# Patient Record
Sex: MALE | Race: BLACK OR AFRICAN AMERICAN | Employment: OTHER | URBAN - METROPOLITAN AREA
[De-identification: names, ages, dates, MRNs, and addresses within clinical notes are randomized per-mention and may not be internally consistent; named-entity substitution may affect disease eponyms.]

---

## 2018-02-16 ENCOUNTER — OFFICE VISIT (OUTPATIENT)
Dept: CARDIOLOGY CLINIC | Age: 68
End: 2018-02-16

## 2018-02-16 VITALS
BODY MASS INDEX: 32.11 KG/M2 | SYSTOLIC BLOOD PRESSURE: 120 MMHG | DIASTOLIC BLOOD PRESSURE: 82 MMHG | OXYGEN SATURATION: 97 % | HEIGHT: 69 IN | HEART RATE: 77 BPM | RESPIRATION RATE: 16 BRPM | WEIGHT: 216.8 LBS

## 2018-02-16 DIAGNOSIS — I10 ESSENTIAL HYPERTENSION, BENIGN: ICD-10-CM

## 2018-02-16 DIAGNOSIS — I49.9 IRREGULAR HEARTBEAT: Primary | ICD-10-CM

## 2018-02-16 DIAGNOSIS — E78.2 MIXED HYPERLIPIDEMIA: ICD-10-CM

## 2018-02-16 DIAGNOSIS — I50.22 SYSTOLIC CHF, CHRONIC (HCC): ICD-10-CM

## 2018-02-16 RX ORDER — IRBESARTAN 300 MG/1
300 TABLET ORAL
COMMUNITY
End: 2019-02-06 | Stop reason: ALTCHOICE

## 2018-02-16 RX ORDER — ATORVASTATIN CALCIUM 10 MG/1
10 TABLET, FILM COATED ORAL DAILY
Qty: 90 TAB | Refills: 3 | Status: SHIPPED | OUTPATIENT
Start: 2018-02-16 | End: 2019-02-20 | Stop reason: SDUPTHER

## 2018-02-16 RX ORDER — BISMUTH SUBSALICYLATE 262 MG
1 TABLET,CHEWABLE ORAL DAILY
COMMUNITY

## 2018-02-16 RX ORDER — VITAMIN E 268 MG
CAPSULE ORAL DAILY
COMMUNITY
End: 2018-12-21

## 2018-02-16 RX ORDER — MELATONIN
DAILY
COMMUNITY
End: 2018-12-21

## 2018-02-16 NOTE — MR AVS SNAPSHOT
[address] 
[phone] Patient: Karson Varela MRN:  [id number] Visit Information Date & Time Provider Department Dept. Phone Encounter #  
 2/16/2018 10:15 AM [address], [place] [facility] [phone] [id number] Your Appointments 3/7/2018  9:00 AM  
ESTABLISHED PATIENT with ECHO, [facility]) Appt Note: Dr. Angie Trinh , 2D ECHO COMPLETE ADULT (TTE) W OR WO CONTR [[id number]] (Order [id number]) 5'9\" 216pds STRESS TEST LEXISCAN/CARDIOLITE [[id number]] (Order [id number])  
 [address] [facility] [address]  
[phone] [address] [facility] [address] [address]  
  
    
 3/7/2018 10:00 AM  
NUCLEAR MEDICINE with NUCLEAR, [facility]) Appt Note: Dr. Angie Trinh , 2D ECHO COMPLETE ADULT (TTE) W OR WO CONTR [[id number]] (Order [id number]) 5'9\" 216pds STRESS TEST LEXISCAN/CARDIOLITE [[id number]] (Order [id number])  
 [address] [facility] [address]  
[phone] [address] [facility] [address] Upcoming Health Maintenance Date Due Hepatitis C Screening 1950 DTaP/Tdap/Td series (1 - Tdap) 9/21/1971 FOBT Q 1 YEAR AGE 50-75 9/21/2000 ZOSTER VACCINE AGE 60> 7/21/2010 GLAUCOMA SCREENING Q2Y 9/21/2015 Pneumococcal 65+ Low/Medium Risk (1 of 2 - PCV13) 9/21/2015 MEDICARE YEARLY EXAM 9/21/2015 Influenza Age 5 to Adult 8/1/2017 Allergies as of 2/16/2018  Review Complete On: 2/16/2018 By: Homer Libman, LPN No Known Allergies Current Immunizations  Never Reviewed No immunizations on file. Not reviewed this visit You Were Diagnosed With   
  
 Codes Comments Irregular heartbeat    -  Primary ICD-10-CM: I49.9 ICD-9-CM: 427.9  Systolic CHF, chronic (HCC)     ICD-10-CM: I50.22 
 ICD-9-CM: 428.22, 428.0 Essential hypertension, benign     ICD-10-CM: I10 
ICD-9-CM: 401.1 Mixed hyperlipidemia     ICD-10-CM: E78.2 ICD-9-CM: 272.2 Vitals BP Pulse Resp Height(growth percentile) Weight(growth percentile) SpO2  
 120/82 (BP 1 Location: Right arm, BP Patient Position: Sitting) 77 16 5' 9\" (1.753 m) 216 lb 12.8 oz (98.3 kg) 97% BMI Smoking Status 32.02 kg/m2 Current Every Day Smoker Vitals History BMI and BSA Data Body Mass Index Body Surface Area 32.02 kg/m 2 2.19 m 2 Preferred Pharmacy Pharmacy Name [facility] - [address] [place] [address] - [address] [place] [address] [address] [phone] Your Updated Medication List  
  
   
This list is accurate as of: 2/16/18 11:23 AM.  Always use your most recent med list. amLODIPine 5 mg tablet Commonly known as:  Dimas Sharp Take 5 mg by mouth daily. aspirin delayed-release 81 mg tablet Take  by mouth daily. atorvastatin 10 mg tablet Commonly known as:  LIPITOR Take 1 Tab by mouth daily. BENICAR 40 mg tablet Generic drug:  olmesartan Take  by mouth daily. carvedilol 25 mg tablet Commonly known as:  Mary Jo [place] Take 25 mg by mouth two (2) times daily (with meals). furosemide 40 mg tablet Commonly known as:  LASIX Take 20 mg by mouth daily. irbesartan 300 mg tablet Commonly known as:  AVAPRO Take 300 mg by mouth nightly. KLOR-CON M10 10 mEq tablet Generic drug:  potassium chloride Take  by mouth.  
  
 metFORMIN 500 mg tablet Commonly known as:  GLUCOPHAGE Take  by mouth two (2) times daily (with meals). multivitamin tablet Commonly known as:  ONE A DAY Take 1 Tab by mouth daily. VITAMIN D3 1,000 unit tablet Generic drug:  cholecalciferol Take  by mouth daily. vitamin E 400 unit capsule Commonly known as:  [address] Take  by mouth daily. Prescriptions Sent to Pharmacy Refills  
 atorvastatin (LIPITOR) 10 mg tablet 3 Sig: Take 1 Tab by mouth daily. Class: Normal  
 Pharmacy: [address] [place] [address], [facility] [address] [place] [address] #: [phone] Route: Oral  
  
We Performed the Following AMB POC EKG ROUTINE W/ 12 LEADS, INTER & REP [81621 CPT(R)] CK [id number] CPT(R)] LIPID PANEL [96148 CPT(R)] METABOLIC PANEL, COMPREHENSIVE [51001 CPT(R)] To-Do List   
 Around 02/19/2018 ECHO:  2D ECHO COMPLETE ADULT (TTE) W OR WO CONTR Around 02/19/2018 ECG:  STRESS TEST LEXISCAN/CARDIOLITE Introducing [facility] [place] [facility]! Tavo Villasenor introduces [employer] patient portal. Now you can access parts of your medical record, email your doctor's office, and request medication refills online. 1. In your internet browser, go to [email or web address] 2. Click on the First Time User? Click Here link in the Sign In box. You will see the New Member Sign Up page. 3. Enter your [employer] Access Code exactly as it appears below. You will not need to use this code after youve completed the sign-up process. If you do not sign up before the expiration date, you must request a new code. · [employer] Access Code: [email or web address] Expires: 5/17/2018 10:00 AM 
 
4. Enter the last four digits of your Social Security Number (xxxx) and Date of Birth (mm/dd/yyyy) as indicated and click Submit. You will be taken to the next sign-up page. 5. Create a [employer] ID. This will be your [employer] login ID and cannot be changed, so think of one that is secure and easy to remember. 6. Create a [employer] password. You can change your password at any time. 7. Enter your Password Reset Question and Answer. This can be used at a later time if you forget your password. 8. Enter your e-mail address. You will receive e-mail notification when new information is available in [address] 9. Click Sign Up. You can now view and download portions of your medical record. 10. Click the Download Summary menu link to download a portable copy of your medical information. If you have questions, please visit the Frequently Asked Questions section of the [employer] website. Remember, [employer] is NOT to be used for urgent needs. For medical emergencies, dial 911. Now available from your iPhone and Android! Please provide this summary of care documentation to your next provider. Your primary care clinician is listed as Tahmina Limon. If you have any questions after today's visit, please call [phone].

## 2018-02-16 NOTE — PROGRESS NOTES
Chief Complaint   Patient presents with    New Patient     per PCP due to irregular heartbeat and possible left bundle     1. Have you been to the ER, urgent care clinic since your last visit? Hospitalized since your last visit? No    2. Have you seen or consulted any other health care providers outside of the [facility] [place] [facility]"" since your last visit? Include any pap smears or colon screening.  No

## 2018-02-16 NOTE — PROGRESS NOTES
NAME:  Neo Fernandez   :   1950   MRN:   [id number]   PCP:  Henri Haley MD           Subjective: The patient is a 79 y.o. male  Who was last seen by us in . Medical hx significant for HTN, CAD s/p CABG , hyperlipidemia, DM type 2. He presents here today for irregular heart rhythm, noticed by PCP during end of January office visit. The pt presents without c/o chest pain, SOB, orthopnea, edema, palpitations, PND, syncope or near-syncope. Exercises twice a wk on treadmill at 5% incline with no problem. Past Medical History:   Diagnosis Date    CAD (coronary artery disease)     Chronic systolic HF (heart failure) (HCC)     DM type 2 (diabetes mellitus, type 2) (HCC)     Gout     HTN (hypertension)     Hypercholesterolemia        Social History   Substance Use Topics    Smoking status: Current Every Day Smoker     Packs/day: 0.50     Years: 40.00    Smokeless tobacco: Never Used    Alcohol use No      Family History   Problem Relation Age of Onset    Heart Disease Father     Heart Attack Father     Hypertension Brother         Review of Systems  Constitutional: Negative for fever, chills, and diaphoresis. Respiratory: Negative for cough, hemoptysis, sputum production, shortness of breath and wheezing. Cardiovascular: Negative for chest pain, palpitations, orthopnea, claudication, leg swelling and PND. Gastrointestinal: Negative for heartburn, nausea, vomiting, blood in stool and melena. Genitourinary: Negative for dysuria and flank pain. Musculoskeletal: Negative for joint pain and back pain; (+) R arm weakness   Skin: Negative for rash. Neurological: Negative for focal weakness, seizures, loss of consciousness, weakness and headaches. Endo/Heme/Allergies: Does not bruise/bleed easily. Psychiatric/Behavioral: Negative for memory loss. The patient does not have insomnia.         Objective:       Vitals:    18 1003 18 1017   BP: 110/82 120/82   Pulse: 77 Resp: 16    SpO2: 97%    Weight: 216 lb 12.8 oz (98.3 kg)    Height: 5' 9\" (1.753 m)     Body mass index is 32.02 kg/(m^2). General PE    Gen: NAD     Mental Status - Alert. General Appearance - Not in acute distress. Neck - no JVD     Chest and Lung Exam     Inspection: Accessory muscles - No use of accessory muscles in breathing. Auscultation:   Breath sounds: - Normal.     Cardiovascular   Inspection: Jugular vein - Bilateral - Inspection Normal.   Palpation/Percussion:   Apical Impulse: - Normal.   Auscultation: Rhythm - Irregular. Heart Sounds - S1 WNL and S2 WNL. No S3 or S4. Murmurs & Other Heart Sounds: Auscultation of the heart reveals - No Murmurs. Peripheral Vascular   Upper Extremity: Inspection - Bilateral - No Cyanotic nailbeds or Digital clubbing. Lower Extremity:   Palpation: Edema - Bilateral - No edema. Abdomen: Soft, non-tender, bowel sounds are active. Neuro: A&O times 3, CN and motor grossly WNL      Data Review:     EKG -  SR c PACs, Intraventricular conduction defect, VR 75  Echo   10'13  SUMMARY:  Left ventricle: Systolic function was mildly to moderately reduced. Ejection fraction was estimated in the range of 45 % to 50 %. There was  possible dyskinesis of the basal-mid anteroseptal and basal-mid  inferoseptal wall(s). There was possible dyskinesis of the apical septal  wall(s). Doppler parameters were consistent with abnormal left ventricular  relaxation (grade 1 diastolic dysfunction). Allergies reviewed  No Known Allergies    Medications reviewed  Current Outpatient Prescriptions   Medication Sig    irbesartan (AVAPRO) 300 mg tablet Take 300 mg by mouth nightly.  cholecalciferol (VITAMIN D3) 1,000 unit tablet Take  by mouth daily.  multivitamin (ONE A DAY) tablet Take 1 Tab by mouth daily.  vitamin E (AQUA GEMS) 400 unit capsule Take  by mouth daily.  aspirin delayed-release 81 mg tablet Take  by mouth daily.     carvedilol (COREG) 25 mg tablet Take 25 mg by mouth two (2) times daily (with meals).  potassium chloride (KLOR-CON M10) 10 mEq tablet Take  by mouth.  metFORMIN (GLUCOPHAGE) 500 mg tablet Take  by mouth two (2) times daily (with meals).  furosemide (LASIX) 40 mg tablet Take 20 mg by mouth daily.  amLODIPine (NORVASC) 5 mg tablet Take 5 mg by mouth daily.  olmesartan (BENICAR) 40 mg tablet Take  by mouth daily. No current facility-administered medications for this visit. Assessment:       ICD-10-CM ICD-9-CM    1. Irregular heartbeat I49.9 427.9 AMB POC EKG ROUTINE W/ 12 LEADS, INTER & REP        Orders Placed This Encounter    AMB POC EKG ROUTINE W/ 12 LEADS, INTER & REP     Order Specific Question:   Reason for Exam:     Answer:   routine    irbesartan (AVAPRO) 300 mg tablet     Sig: Take 300 mg by mouth nightly.  cholecalciferol (VITAMIN D3) 1,000 unit tablet     Sig: Take  by mouth daily.  multivitamin (ONE A DAY) tablet     Sig: Take 1 Tab by mouth daily.  vitamin E (AQUA GEMS) 400 unit capsule     Sig: Take  by mouth daily. Patient Active Problem List   Diagnosis Code    ASHD (arteriosclerotic heart disease) H31.15    Systolic CHF, chronic (HCC) I50.22    Essential hypertension, benign I10    Mixed hyperlipidemia E78.2       Plan:     ASCVD, HLD  Hx CABG x 5 in 2005   Cont ASA, BB  Start low dose Atorvastatin. Ordered FLP, lab slip given to pt  Will order lexiscan to evaluate for ischemia    HFrEF, Echo 10'13 EF 45-50%  Clinically compensated. Continue Furosemide, BB, ARB  Will repeat Echo    Irregular Heart Rhythm  EKG revealed SR with PACs. Asymptomatic. Current Smoker <05. PPD x 30+ yrs  Discussed tobacco cessation      Patient presents to reestablish care, new arrhythmia. Continue current care and follow up when testing complete. Mark Marrero NP      [facility]    2/16/2018         Patient seen, examined by me personally. Plan discussed as detailed.  Agree with note as outlined by  NP. I confirm findings in history and physical exam. No additional findings noted. Agree with plan as outlined above.      Brian Tian MD

## 2018-02-21 LAB
ALBUMIN SERPL-MCNC: 4.1 G/DL (ref 3.6–4.8)
ALBUMIN/GLOB SERPL: 1.6 {RATIO} (ref 1.2–2.2)
ALP SERPL-CCNC: 100 IU/L (ref 39–117)
ALT SERPL-CCNC: 28 IU/L (ref 0–44)
AST SERPL-CCNC: 25 IU/L (ref 0–40)
BILIRUB SERPL-MCNC: 0.9 MG/DL (ref 0–1.2)
BUN SERPL-MCNC: 8 MG/DL (ref 8–27)
BUN/CREAT SERPL: 8 (ref 10–24)
CALCIUM SERPL-MCNC: 9 MG/DL (ref 8.6–10.2)
CHLORIDE SERPL-SCNC: 99 MMOL/L (ref 96–106)
CHOLEST SERPL-MCNC: 148 MG/DL (ref 100–199)
CK SERPL-CCNC: 612 U/L (ref 24–204)
CO2 SERPL-SCNC: 28 MMOL/L (ref 18–29)
CREAT SERPL-MCNC: 1 MG/DL (ref 0.76–1.27)
GFR SERPLBLD CREATININE-BSD FMLA CKD-EPI: 78 ML/MIN/{1.73_M2}
GFR SERPLBLD CREATININE-BSD FMLA CKD-EPI: 90 ML/MIN/{1.73_M2}
GLOBULIN SER CALC-MCNC: 2.5 G/L (ref 1.5–4.5)
GLUCOSE SERPL-MCNC: 181 MG/DL (ref 65–99)
HDLC SERPL-MCNC: 30 MG/DL
INTERPRETATION, 910389: NORMAL
LDLC SERPL CALC-MCNC: 64 MG/DL (ref 0–99)
POTASSIUM SERPL-SCNC: 3.5 MMOL/L (ref 3.5–5.2)
PROT SERPL-MCNC: 6.6 G/DL (ref 6–8.5)
SODIUM SERPL-SCNC: 146 MMOL/L (ref 134–144)
TRIGL SERPL-MCNC: 269 MG/DL (ref 0–149)
VLDLC SERPL CALC-MCNC: 54 MG/DL (ref 5–40)

## 2018-03-07 ENCOUNTER — CLINICAL SUPPORT (OUTPATIENT)
Dept: CARDIOLOGY CLINIC | Age: 68
End: 2018-03-07

## 2018-03-07 DIAGNOSIS — R93.1 ABNORMAL NUCLEAR CARDIAC IMAGING TEST: Primary | ICD-10-CM

## 2018-03-07 DIAGNOSIS — I49.9 IRREGULAR HEARTBEAT: ICD-10-CM

## 2018-03-07 DIAGNOSIS — I50.22 SYSTOLIC CHF, CHRONIC (HCC): ICD-10-CM

## 2018-03-07 DIAGNOSIS — E78.2 MIXED HYPERLIPIDEMIA: ICD-10-CM

## 2018-03-07 DIAGNOSIS — I10 ESSENTIAL HYPERTENSION, BENIGN: ICD-10-CM

## 2018-03-13 NOTE — PROGRESS NOTES
Verified patient with two identifiers. Spoke with patient regarding STRESS/ECHO test results. Daisy Conrad pt will need a follow up with Dr. Nando Jasmine. Thanks!

## 2018-04-25 ENCOUNTER — OFFICE VISIT (OUTPATIENT)
Dept: CARDIOLOGY CLINIC | Age: 68
End: 2018-04-25

## 2018-04-25 VITALS
OXYGEN SATURATION: 98 % | HEART RATE: 67 BPM | DIASTOLIC BLOOD PRESSURE: 90 MMHG | SYSTOLIC BLOOD PRESSURE: 128 MMHG | BODY MASS INDEX: 32.22 KG/M2 | WEIGHT: 217.5 LBS | HEIGHT: 69 IN

## 2018-04-25 DIAGNOSIS — I50.22 SYSTOLIC CHF, CHRONIC (HCC): Primary | ICD-10-CM

## 2018-04-25 DIAGNOSIS — E78.2 MIXED HYPERLIPIDEMIA: ICD-10-CM

## 2018-04-25 DIAGNOSIS — I10 ESSENTIAL HYPERTENSION, BENIGN: ICD-10-CM

## 2018-04-25 DIAGNOSIS — I25.10 ASHD (ARTERIOSCLEROTIC HEART DISEASE): ICD-10-CM

## 2018-04-25 RX ORDER — SITAGLIPTIN AND METFORMIN HYDROCHLORIDE 50; 1000 MG/1; MG/1
1 TABLET, FILM COATED ORAL 2 TIMES DAILY WITH MEALS
COMMUNITY
Start: 2018-03-01 | End: 2019-07-11

## 2018-04-25 NOTE — PROGRESS NOTES
1. Have you been to the ER, urgent care clinic since your last visit? Hospitalized since your last visit? {Yes when where and reason for visit:20441}    2. Have you seen or consulted any other health care providers outside of the [facility] since your last visit? Include any pap smears or colon screening.  {Yes when where and reason for visit:20441}    TEST RESULTS

## 2018-04-25 NOTE — PROGRESS NOTES
Chief Complaint   Patient presents with    Irregular Heart Beat     STRESS TEST RESULTS     1. Have you been to the ER, urgent care clinic since your last visit? Hospitalized since your last visit? NO    2. Have you seen or consulted any other health care providers outside of the [address] [place] [address] since your last visit? Include any pap smears or colon screening.  NO

## 2018-04-25 NOTE — PROGRESS NOTES
[address], [address] [facility]  [phone]     Subjective:      Taryn Holguin is a 79 y.o. male is here to discuss result of recent stress test.  The patient denies chest pain/ shortness of breath, orthopnea, PND, LE edema, palpitations, syncope, or presyncope. Ammon [place] 03/18  Impression:   Myocardial perfusion imaging is abnormal: there is a large area of infarction in the infero-lateral wall. Overall left ventricular systolic function was abnormal: with regional wall motion abnormalities (as noted above). Compared to the study from 10/06/201, the current study reveals larger LV cavity and reduced LVEF. These test results indicate high likelihood for the presence of angiographically significant coronary artery disease.         Echo 03/18  SUMMARY:  Left ventricle: The ventricle was mildly dilated. Systolic function was  moderately reduced. Ejection fraction was estimated in the range of 35 %  to 40 %. There was severe hypokinesis of the entire inferior wall(s). Patient Active Problem List    Diagnosis Date Noted    ASHD (arteriosclerotic heart disease) 61/64/9411    Systolic CHF, chronic ([address] 09/17/2013    Essential hypertension, benign 09/17/2013    Mixed hyperlipidemia 09/17/2013      Sofía Addison MD  Past Medical History:   Diagnosis Date    CAD (coronary artery disease)     Chronic systolic HF (heart failure) ([address]     DM type 2 (diabetes mellitus, type 2) ([address]     Gout     HTN (hypertension)     Hypercholesterolemia       Past Surgical History:   Procedure Laterality Date    HX CORONARY ARTERY BYPASS GRAFT  2005     No Known Allergies   Family History   Problem Relation Age of Onset    Heart Disease Father     Heart Attack Father     Hypertension Brother       Social History     Social History    Marital status:      Spouse name: N/A    Number of children: N/A    Years of education: N/A     Occupational History    Not on file. Social History Main Topics    Smoking status: Current Every Day Smoker     Packs/day: 0.50     Years: 40.00    Smokeless tobacco: Never Used    Alcohol use No    Drug use: No    Sexual activity: Not on file     Other Topics Concern    Not on file     Social History Narrative      Current Outpatient Prescriptions   Medication Sig    JANUMET 50-1,000 mg per tablet two (2) times daily (with meals).  irbesartan (AVAPRO) 300 mg tablet Take 300 mg by mouth nightly.  cholecalciferol (VITAMIN D3) 1,000 unit tablet Take  by mouth daily.  multivitamin (ONE A DAY) tablet Take 1 Tab by mouth daily.  vitamin E (AQUA GEMS) 400 unit capsule Take  by mouth daily.  atorvastatin (LIPITOR) 10 mg tablet Take 1 Tab by mouth daily.  aspirin delayed-release 81 mg tablet Take  by mouth daily.  carvedilol (COREG) 25 mg tablet Take 25 mg by mouth two (2) times daily (with meals).  potassium chloride (KLOR-CON M10) 10 mEq tablet Take  by mouth two (2) times a day.  metFORMIN (GLUCOPHAGE) 500 mg tablet Take  by mouth two (2) times daily (with meals).  furosemide (LASIX) 40 mg tablet Take 20 mg by mouth daily.  amLODIPine (NORVASC) 5 mg tablet Take 5 mg by mouth daily. No current facility-administered medications for this visit. Review of Symptoms:  11 systems reviewed, negative other than as stated in the HPI    Physical ExamPhysical Exam:    Vitals:    04/25/18 0906 04/25/18 0913   BP: (!) 126/94 128/90   Pulse: 67    SpO2: 98%    Weight: 217 lb 8 oz (98.7 kg)    Height: 5' 9\" (1.753 m)      Body mass index is 32.12 kg/(m^2). General PE   Gen:  NAD  Mental Status - Alert. General Appearance - Not in acute distress. Chest and Lung Exam   Inspection: Accessory muscles - No use of accessory muscles in breathing.    Auscultation:   Breath sounds: - Normal.   Cardiovascular   Inspection: Jugular vein - Bilateral - Inspection Normal.   Palpation/Percussion:   Apical Impulse: - Normal. Auscultation: Rhythm - Regular. Heart Sounds - S1 WNL and S2 WNL. No S3 or S4. Murmurs & Other Heart Sounds: Auscultation of the heart reveals - No Murmurs. Peripheral Vascular   Upper Extremity: Inspection - Bilateral - No Cyanotic nailbeds or Digital clubbing. Lower Extremity:   Palpation: Edema - Bilateral - No edema. Abdomen:   Soft, non-tender, bowel sounds are active. Neuro: A&O times 3, CN and motor grossly WNL    Labs:   Lab Results   Component Value Date/Time    Cholesterol, total 148 02/20/2018 10:21 AM    HDL Cholesterol 30 (L) 02/20/2018 10:21 AM    LDL, calculated 64 02/20/2018 10:21 AM    Triglyceride 269 (H) 02/20/2018 10:21 AM     No results found for: CPK, CPKX, CPX  Lab Results   Component Value Date/Time    Sodium 146 (H) 02/20/2018 10:21 AM    Potassium 3.5 02/20/2018 10:21 AM    Chloride 99 02/20/2018 10:21 AM    CO2 28 02/20/2018 10:21 AM    Glucose 181 (H) 02/20/2018 10:21 AM    BUN 8 02/20/2018 10:21 AM    Creatinine 1.00 02/20/2018 10:21 AM    BUN/Creatinine ratio 8 (L) 02/20/2018 10:21 AM    GFR est AA 90 02/20/2018 10:21 AM    GFR est non-AA 78 02/20/2018 10:21 AM    Calcium 9.0 02/20/2018 10:21 AM    Bilirubin, total 0.9 02/20/2018 10:21 AM    AST (SGOT) 25 02/20/2018 10:21 AM    Alk. phosphatase 100 02/20/2018 10:21 AM    Protein, total 6.6 02/20/2018 10:21 AM    Albumin 4.1 02/20/2018 10:21 AM    A-G Ratio 1.6 02/20/2018 10:21 AM    ALT (SGPT) 28 02/20/2018 10:21 AM       EKG:  NSR      Assessment:     Assessment:      1. Systolic CHF, chronic ([address]    2. ASHD (arteriosclerotic heart disease)    3. Essential hypertension, benign    4. Mixed hyperlipidemia        Orders Placed This Encounter    AMB POC EKG ROUTINE W/ 12 LEADS, INTER & REP     Order Specific Question:   Reason for Exam:     Answer:   ROUTINE    JANUMET 50-1,000 mg per tablet     Sig: two (2) times daily (with meals).         Plan:     ASCVD  Hx CABG x 5 in 2005   Nuclear stress test showed infarct infero-lateral 03/18  No cardiac complaints  Cont ASA, BB, statin       HFrEF  03/18 Echo showed Systolic function down to 35-40%  (was EF 45-50% in 2013)  Clinically compensated. No cardiac complaints. No swelling on exam  Continue Furosemide, BB, ARB      HLD  02/18 LDL at target, 64. Continue statin    HTN  Controlled with current therapy     Hx Irregular Heart Rhythm  EKG today SR. Previous EKG showed PACs. Asymptomatic.     Current Smoker <05. PPD x 30+ yrs  Discussed tobacco cessation       Continue current care and f/u in 6 months. Marion Gaspar NP       [facility]    4/25/2018         Patient seen, examined by me personally. Plan discussed as detailed. Agree with note as outlined by  NP. I confirm findings in history and physical exam. No additional findings noted. Agree with plan as outlined above. Consider coronary angiography if symptoms.      Macr Bravo MD

## 2018-08-10 ENCOUNTER — OFFICE VISIT (OUTPATIENT)
Dept: CARDIOLOGY CLINIC | Age: 68
End: 2018-08-10

## 2018-08-10 VITALS
OXYGEN SATURATION: 98 % | HEART RATE: 71 BPM | RESPIRATION RATE: 16 BRPM | DIASTOLIC BLOOD PRESSURE: 84 MMHG | WEIGHT: 215.6 LBS | BODY MASS INDEX: 31.93 KG/M2 | SYSTOLIC BLOOD PRESSURE: 116 MMHG | HEIGHT: 69 IN

## 2018-08-10 DIAGNOSIS — I10 ESSENTIAL HYPERTENSION, BENIGN: ICD-10-CM

## 2018-08-10 DIAGNOSIS — E78.2 MIXED HYPERLIPIDEMIA: ICD-10-CM

## 2018-08-10 DIAGNOSIS — I50.22 SYSTOLIC CHF, CHRONIC (HCC): Primary | ICD-10-CM

## 2018-08-10 DIAGNOSIS — I25.10 ASHD (ARTERIOSCLEROTIC HEART DISEASE): ICD-10-CM

## 2018-08-10 NOTE — MR AVS SNAPSHOT
[address] [facility] 
[phone] Patient: Joyce Knight MRN:  [facility]:[id number]/36/[id number] Visit Information Date & Time Provider Department Dept. Phone Encounter #  
 8/10/2018  9:15 AM Sherry Vincent, [facility] [phone] [id number] Your Appointments 2/6/2019 10:15 AM  
ESTABLISHED PATIENT with MD [facility]) Appt Note: 6 month f/u 8/10/18 kb  
 [id number] [facility]  
[phone] [id number] [facility] Upcoming Health Maintenance Date Due Hepatitis C Screening 1950 DTaP/Tdap/Td series (1 - Tdap) 9/21/1971 FOBT Q 1 YEAR AGE 50-75 9/21/2000 ZOSTER VACCINE AGE 60> 7/21/2010 GLAUCOMA SCREENING Q2Y 9/21/2015 Pneumococcal 65+ Low/Medium Risk (1 of 2 - PCV13) 9/21/2015 MEDICARE YEARLY EXAM 3/14/2018 Influenza Age 5 to Adult 8/1/2018 Allergies as of 8/10/2018  Review Complete On: 8/10/2018 By: Glynn Roa No Known Allergies Current Immunizations  Never Reviewed No immunizations on file. Not reviewed this visit You Were Diagnosed With   
  
 Codes Comments Systolic CHF, chronic (HCC)    -  Primary ICD-10-CM: R94.24 ICD-9-CM: 428.22, 428.0 ASHD (arteriosclerotic heart disease)     ICD-10-CM: I25.10 ICD-9-CM: 414.00 Essential hypertension, benign     ICD-10-CM: I10 
ICD-9-CM: 401.1 Mixed hyperlipidemia     ICD-10-CM: E78.2 ICD-9-CM: 272.2 BMI 31.0-31.9,adult     ICD-10-CM: Z68.31 
ICD-9-CM: V85.31 Vitals BP Pulse Resp Height(growth percentile) Weight(growth percentile) SpO2  
 116/84 (BP 1 Location: Right arm, BP Patient Position: Sitting) 71 16 5' 9\" (1.753 m) 215 lb 9.6 oz (97.8 kg) 98% BMI Smoking Status 31.84 kg/m2 Current Every Day Smoker Vitals History BMI and BSA Data Body Mass Index Body Surface Area  
 31.84 kg/m 2 2.18 m 2 Preferred Pharmacy Pharmacy Name Phone [facility] [address] [place] [address] [phone] Your Updated Medication List  
  
   
This list is accurate as of 8/10/18  9:47 AM.  Always use your most recent med list. amLODIPine 5 mg tablet Commonly known as:  Allen Del Take 5 mg by mouth daily. aspirin delayed-release 81 mg tablet Take  by mouth daily. atorvastatin 10 mg tablet Commonly known as:  LIPITOR Take 1 Tab by mouth daily. carvedilol 25 mg tablet Commonly known as:  Simone Bourdon Take 25 mg by mouth two (2) times daily (with meals). furosemide 40 mg tablet Commonly known as:  LASIX Take 20 mg by mouth daily. irbesartan 300 mg tablet Commonly known as:  AVAPRO Take 300 mg by mouth nightly. JANUMET 50-1,000 mg per tablet Generic drug:  SITagliptin-metFORMIN  
two (2) times daily (with meals). KLOR-CON M10 10 mEq tablet Generic drug:  potassium chloride Take  by mouth two (2) times a day. metFORMIN 500 mg tablet Commonly known as:  GLUCOPHAGE Take  by mouth two (2) times daily (with meals). multivitamin tablet Commonly known as:  ONE A DAY Take 1 Tab by mouth daily. MUSCLE RUB EX  
as needed. VITAMIN D3 1,000 unit tablet Generic drug:  cholecalciferol Take  by mouth daily. vitamin E 400 unit capsule Commonly known as:  [address] Take  by mouth daily. We Performed the Following AMB POC EKG ROUTINE W/ 12 LEADS, INTER & REP [36228 CPT(R)] Introducing [facility]! [employer] [facility] introduces [employer] patient portal. Now you can access parts of your medical record, email your doctor's office, and request medication refills online. 1. In your internet browser, go to [email or web address] 2. Click on the First Time User? Click Here link in the Sign In box. You will see the New Member Sign Up page. 3. Enter your [employer] Access Code exactly as it appears below. You will not need to use this code after youve completed the sign-up process. If you do not sign up before the expiration date, you must request a new code. · [employer] Access Code: [email or web address] Expires: 11/8/2018  9:47 AM 
 
4. Enter the last four digits of your Social Security Number (xxxx) and Date of Birth (mm/dd/yyyy) as indicated and click Submit. You will be taken to the next sign-up page. 5. Create a MyQuoteApp ID. This will be your My[employer] login ID and cannot be changed, so think of one that is secure and easy to remember. 6. Create a [employer] password. You can change your password at any time. 7. Enter your Password Reset Question and Answer. This can be used at a later time if you forget your password. 8. Enter your e-mail address. You will receive e-mail notification when new information is available in [address] 9. Click Sign Up. You can now view and download portions of your medical record. 10. Click the Download Summary menu link to download a portable copy of your medical information. If you have questions, please visit the Frequently Asked Questions section of the [employer] website. Remember, My[employer]App is NOT to be used for urgent needs. For medical emergencies, dial 911. Now available from your iPhone and Android! Please provide this summary of care documentation to your next provider. Your primary care clinician is listed as Destin Clark. If you have any questions after today's visit, please call [phone].

## 2018-08-10 NOTE — PROGRESS NOTES
NAME:  Karma Manjarrez   :   1950   MRN:   [id number]   PCP:  Jamin Kitchen MD           Subjective: The patient is a 79y.o. year old male  who returns for a 3 mo follow upon ASHD, CHF. Since the last visit, patient reports no change in exercise tolerance, chest pain, edema, medication intolerance, palpitations, shortness of breath, PND/orthopnea wheezing, sputum, syncope, dizziness or light headedness. Doing well. Past Medical History:   Diagnosis Date    CAD (coronary artery disease)     Chronic systolic HF (heart failure) (HCC)     DM type 2 (diabetes mellitus, type 2) (HCC)     Gout     HTN (hypertension)     Hypercholesterolemia        Social History   Substance Use Topics    Smoking status: Current Every Day Smoker     Packs/day: 0.50     Years: 40.00    Smokeless tobacco: Never Used    Alcohol use No      Family History   Problem Relation Age of Onset    Heart Disease Father     Heart Attack Father     Hypertension Brother         Review of Systems  Constitutional: Negative for fever, chills, and diaphoresis. Respiratory: Negative for cough, hemoptysis, sputum production, shortness of breath and wheezing. Cardiovascular: Negative for chest pain, palpitations, orthopnea, claudication, leg swelling and PND. Gastrointestinal: Negative for heartburn, nausea, vomiting, blood in stool and melena. Genitourinary: Negative for dysuria and flank pain. Musculoskeletal: Negative for joint pain and back pain. Skin: Negative for rash. Neurological: Negative for focal weakness, seizures, loss of consciousness, weakness and headaches. Endo/Heme/Allergies: Does not bruise/bleed easily. Psychiatric/Behavioral: Negative for memory loss. The patient does not have insomnia.         Objective:       Vitals:    08/10/18 0908 08/10/18 0916   BP: 114/86 116/84   Pulse: 71    Resp: 16    SpO2: 98%    Weight: 215 lb 9.6 oz (97.8 kg)    Height: 5' 9\" (1.753 m)     Body mass index is 31.84 kg/(m^2). General PE    Gen: NAD     Mental Status - Alert. General Appearance - Not in acute distress. Neck - no JVD     Chest and Lung Exam     Inspection: Accessory muscles - No use of accessory muscles in breathing. Auscultation:   Breath sounds: - Normal.     Cardiovascular   Inspection: Jugular vein - Bilateral - Inspection Normal.   Palpation/Percussion:   Apical Impulse: - Normal.   Auscultation: Rhythm - Regular. Heart Sounds - S1 WNL and S2 WNL. No S3 or S4. Murmurs & Other Heart Sounds: Auscultation of the heart reveals - No Murmurs. Peripheral Vascular   Upper Extremity: Inspection - Bilateral - No Cyanotic nailbeds or Digital clubbing. Lower Extremity:   Palpation: Edema - Bilateral - No edema. Abdomen: Soft, non-tender, bowel sounds are active. Neuro: A&O times 3, CN and motor grossly WNL      Data Review:     EKG -  Sinus  Rhythm   -Left axis -anterior fascicular block.    -Nonspecific ST depression    LABS-   Lab Results   Component Value Date/Time    Cholesterol, total 148 02/20/2018 10:21 AM    HDL Cholesterol 30 (L) 02/20/2018 10:21 AM    LDL, calculated 64 02/20/2018 10:21 AM    VLDL, calculated 54 (H) 02/20/2018 10:21 AM    Triglyceride 269 (H) 02/20/2018 10:21 AM         Allergies reviewed  No Known Allergies    Medications reviewed  Current Outpatient Prescriptions   Medication Sig    methyl salicylate/menthol (MUSCLE RUB EX) as needed.  JANUMET 50-1,000 mg per tablet two (2) times daily (with meals).  irbesartan (AVAPRO) 300 mg tablet Take 300 mg by mouth nightly.  cholecalciferol (VITAMIN D3) 1,000 unit tablet Take  by mouth daily.  multivitamin (ONE A DAY) tablet Take 1 Tab by mouth daily.  vitamin E (AQUA GEMS) 400 unit capsule Take  by mouth daily.  atorvastatin (LIPITOR) 10 mg tablet Take 1 Tab by mouth daily.  aspirin delayed-release 81 mg tablet Take  by mouth daily.     carvedilol (COREG) 25 mg tablet Take 25 mg by mouth two (2) times daily (with meals).  potassium chloride (KLOR-CON M10) 10 mEq tablet Take  by mouth two (2) times a day.  furosemide (LASIX) 40 mg tablet Take 20 mg by mouth daily.  amLODIPine (NORVASC) 5 mg tablet Take 5 mg by mouth daily.  metFORMIN (GLUCOPHAGE) 500 mg tablet Take  by mouth two (2) times daily (with meals). No current facility-administered medications for this visit. Assessment:       ICD-10-CM ICD-9-CM    1. Systolic CHF, chronic (HCC) I50.22 428.22 AMB POC EKG ROUTINE W/ 12 LEADS, INTER & REP     428.0    2. ASHD (arteriosclerotic heart disease) I25.10 414.00    3. Essential hypertension, benign I10 401.1    4. Mixed hyperlipidemia E78.2 272.2    5. BMI 31.0-31.9,adult Z68.31 V85.31         Orders Placed This Encounter    AMB POC EKG ROUTINE W/ 12 LEADS, INTER & REP     Order Specific Question:   Reason for Exam:     Answer:   ROUTINE    methyl salicylate/menthol (MUSCLE RUB EX)     Sig: as needed. Patient Active Problem List   Diagnosis Code    ASHD (arteriosclerotic heart disease) P32.10    Systolic CHF, chronic (HCC) I50.22    Essential hypertension, benign I10    Mixed hyperlipidemia E78.2       Plan:     Patient presents for follow up. Stable and will see him back in 6 mo. ASHD -  Hx CABG x 5 in 2005   Nuclear stress test showed infarct infero-lateral 03/18  No cardiac complaints  Cont ASA, BB, statin  Encouraged daily exercise - has treadmill         HFrEF  03/18 Echo showed Systolic function down to 35-40%  (was EF 45-50% in 2013)  Clinically compensated. No cardiac complaints. Weight stable. Continue Furosemide, BB, ARB        HLD  02/18 LDL at target, 64. Continue statin     HTN  Controlled with current therapy      Hx Irregular Heart Rhythm  EKG today SR.        Current Smoker <05. PPD x 30+ yrs  Discussed tobacco cessation        Pallavi Hernández ANP    Patient seen and examined by me with nurse practitioner.   I personally performed all components of the history, physical, and medical decision making and agree with the assessment and plan with minor modifications as noted. DUY Rod MD, [facility]

## 2018-08-10 NOTE — PROGRESS NOTES
Chief Complaint   Patient presents with    CHF     3 MO. F/U     1. Have you been to the ER, urgent care clinic since your last visit? Hospitalized since your last visit? NO    2. Have you seen or consulted any other health care providers outside of the [address] [place] [address] since your last visit? Include any pap smears or colon screening.  NO

## 2018-12-21 ENCOUNTER — HOSPITAL ENCOUNTER (OUTPATIENT)
Age: 68
Setting detail: OBSERVATION
Discharge: HOME | End: 2018-12-23
Attending: EMERGENCY MEDICINE | Admitting: FAMILY MEDICINE
Payer: MEDICARE

## 2018-12-21 ENCOUNTER — APPOINTMENT (OUTPATIENT)
Dept: CT IMAGING | Age: 68
End: 2018-12-21
Attending: FAMILY MEDICINE
Payer: MEDICARE

## 2018-12-21 ENCOUNTER — APPOINTMENT (OUTPATIENT)
Dept: GENERAL RADIOLOGY | Age: 68
End: 2018-12-21
Attending: EMERGENCY MEDICINE
Payer: MEDICARE

## 2018-12-21 ENCOUNTER — APPOINTMENT (OUTPATIENT)
Dept: CT IMAGING | Age: 68
End: 2018-12-21
Attending: EMERGENCY MEDICINE
Payer: MEDICARE

## 2018-12-21 DIAGNOSIS — R55 SYNCOPE AND COLLAPSE: Primary | ICD-10-CM

## 2018-12-21 LAB
ALBUMIN SERPL-MCNC: 3.3 G/DL (ref 3.5–5)
ALBUMIN/GLOB SERPL: 0.9 {RATIO} (ref 1.1–2.2)
ALP SERPL-CCNC: 110 U/L (ref 45–117)
ALT SERPL-CCNC: 33 U/L (ref 12–78)
ANION GAP SERPL CALC-SCNC: 9 MMOL/L (ref 5–15)
APTT PPP: 26.8 SEC (ref 22.1–32)
ARTERIAL PATENCY WRIST A: YES
AST SERPL-CCNC: 19 U/L (ref 15–37)
ATRIAL RATE: 67 BPM
BASE EXCESS BLD CALC-SCNC: 2 MMOL/L
BASOPHILS # BLD: 0 K/UL (ref 0–0.1)
BASOPHILS NFR BLD: 1 % (ref 0–1)
BDY SITE: ABNORMAL
BILIRUB SERPL-MCNC: 0.9 MG/DL (ref 0.2–1)
BUN SERPL-MCNC: 10 MG/DL (ref 6–20)
BUN/CREAT SERPL: 9 (ref 12–20)
CALCIUM SERPL-MCNC: 8.5 MG/DL (ref 8.5–10.1)
CALCULATED P AXIS, ECG09: 113 DEGREES
CALCULATED R AXIS, ECG10: -60 DEGREES
CALCULATED T AXIS, ECG11: 148 DEGREES
CHLORIDE SERPL-SCNC: 105 MMOL/L (ref 97–108)
CK SERPL-CCNC: 112 U/L (ref 39–308)
CO2 SERPL-SCNC: 25 MMOL/L (ref 21–32)
COMMENT, HOLDF: NORMAL
CREAT SERPL-MCNC: 1.12 MG/DL (ref 0.7–1.3)
DIAGNOSIS, 93000: NORMAL
DIFFERENTIAL METHOD BLD: ABNORMAL
EOSINOPHIL # BLD: 0.1 K/UL (ref 0–0.4)
EOSINOPHIL NFR BLD: 2 % (ref 0–7)
ERYTHROCYTE [DISTWIDTH] IN BLOOD BY AUTOMATED COUNT: 13.1 % (ref 11.5–14.5)
EST. AVERAGE GLUCOSE BLD GHB EST-MCNC: 212 MG/DL
FOLATE SERPL-MCNC: 19.1 NG/ML (ref 5–21)
GAS FLOW.O2 O2 DELIVERY SYS: ABNORMAL L/MIN
GLOBULIN SER CALC-MCNC: 3.6 G/DL (ref 2–4)
GLUCOSE BLD STRIP.AUTO-MCNC: 239 MG/DL (ref 65–100)
GLUCOSE SERPL-MCNC: 283 MG/DL (ref 65–100)
HBA1C MFR BLD: 9 % (ref 4.2–6.3)
HCO3 BLD-SCNC: 26.2 MMOL/L (ref 22–26)
HCT VFR BLD AUTO: 46.8 % (ref 36.6–50.3)
HGB BLD-MCNC: 15.6 G/DL (ref 12.1–17)
IMM GRANULOCYTES # BLD: 0 K/UL (ref 0–0.04)
IMM GRANULOCYTES NFR BLD AUTO: 1 % (ref 0–0.5)
INR PPP: 1.2 (ref 0.9–1.1)
LACTATE SERPL-SCNC: 2.7 MMOL/L (ref 0.4–2)
LACTATE SERPL-SCNC: 3.8 MMOL/L (ref 0.4–2)
LACTATE SERPL-SCNC: 4 MMOL/L (ref 0.4–2)
LYMPHOCYTES # BLD: 1.3 K/UL (ref 0.8–3.5)
LYMPHOCYTES NFR BLD: 23 % (ref 12–49)
MAGNESIUM SERPL-MCNC: 0.3 MG/DL (ref 1.6–2.4)
MAGNESIUM SERPL-MCNC: 1.4 MG/DL (ref 1.6–2.4)
MCH RBC QN AUTO: 28.1 PG (ref 26–34)
MCHC RBC AUTO-ENTMCNC: 33.3 G/DL (ref 30–36.5)
MCV RBC AUTO: 84.2 FL (ref 80–99)
MONOCYTES # BLD: 0.5 K/UL (ref 0–1)
MONOCYTES NFR BLD: 8 % (ref 5–13)
NEUTS SEG # BLD: 3.7 K/UL (ref 1.8–8)
NEUTS SEG NFR BLD: 66 % (ref 32–75)
NRBC # BLD: 0 K/UL (ref 0–0.01)
NRBC BLD-RTO: 0 PER 100 WBC
P-R INTERVAL, ECG05: 172 MS
PCO2 BLD: 39.8 MMHG (ref 35–45)
PH BLD: 7.43 [PH] (ref 7.35–7.45)
PLATELET # BLD AUTO: 141 K/UL (ref 150–400)
PMV BLD AUTO: 10.6 FL (ref 8.9–12.9)
PO2 BLD: 60 MMHG (ref 80–100)
POTASSIUM SERPL-SCNC: 3.5 MMOL/L (ref 3.5–5.1)
PROT SERPL-MCNC: 6.9 G/DL (ref 6.4–8.2)
PROTHROMBIN TIME: 12 SEC (ref 9–11.1)
Q-T INTERVAL, ECG07: 436 MS
QRS DURATION, ECG06: 138 MS
QTC CALCULATION (BEZET), ECG08: 460 MS
RBC # BLD AUTO: 5.56 M/UL (ref 4.1–5.7)
SAMPLES BEING HELD,HOLD: NORMAL
SAO2 % BLD: 91 % (ref 92–97)
SERVICE CMNT-IMP: ABNORMAL
SODIUM SERPL-SCNC: 139 MMOL/L (ref 136–145)
SPECIMEN TYPE: ABNORMAL
THERAPEUTIC RANGE,PTTT: NORMAL SECS (ref 58–77)
TOTAL RESP. RATE, ITRR: 18
TROPONIN I SERPL-MCNC: <0.05 NG/ML
TROPONIN I SERPL-MCNC: <0.05 NG/ML
TSH SERPL DL<=0.05 MIU/L-ACNC: 0.61 UIU/ML (ref 0.36–3.74)
VENTRICULAR RATE, ECG03: 67 BPM
VIT B12 SERPL-MCNC: 138 PG/ML (ref 193–986)
WBC # BLD AUTO: 5.6 K/UL (ref 4.1–11.1)

## 2018-12-21 PROCEDURE — 85730 THROMBOPLASTIN TIME PARTIAL: CPT

## 2018-12-21 PROCEDURE — 82607 VITAMIN B-12: CPT

## 2018-12-21 PROCEDURE — 80053 COMPREHEN METABOLIC PANEL: CPT

## 2018-12-21 PROCEDURE — 82962 GLUCOSE BLOOD TEST: CPT

## 2018-12-21 PROCEDURE — 99218 HC RM OBSERVATION: CPT

## 2018-12-21 PROCEDURE — 83036 HEMOGLOBIN GLYCOSYLATED A1C: CPT

## 2018-12-21 PROCEDURE — 84484 ASSAY OF TROPONIN QUANT: CPT

## 2018-12-21 PROCEDURE — 71045 X-RAY EXAM CHEST 1 VIEW: CPT

## 2018-12-21 PROCEDURE — 74011636637 HC RX REV CODE- 636/637: Performed by: FAMILY MEDICINE

## 2018-12-21 PROCEDURE — 82746 ASSAY OF FOLIC ACID SERUM: CPT

## 2018-12-21 PROCEDURE — 83605 ASSAY OF LACTIC ACID: CPT

## 2018-12-21 PROCEDURE — 36415 COLL VENOUS BLD VENIPUNCTURE: CPT

## 2018-12-21 PROCEDURE — 70450 CT HEAD/BRAIN W/O DYE: CPT

## 2018-12-21 PROCEDURE — 74011000258 HC RX REV CODE- 258: Performed by: FAMILY MEDICINE

## 2018-12-21 PROCEDURE — 96360 HYDRATION IV INFUSION INIT: CPT

## 2018-12-21 PROCEDURE — 81001 URINALYSIS AUTO W/SCOPE: CPT

## 2018-12-21 PROCEDURE — 93005 ELECTROCARDIOGRAM TRACING: CPT

## 2018-12-21 PROCEDURE — 85610 PROTHROMBIN TIME: CPT

## 2018-12-21 PROCEDURE — 36600 WITHDRAWAL OF ARTERIAL BLOOD: CPT

## 2018-12-21 PROCEDURE — 96361 HYDRATE IV INFUSION ADD-ON: CPT

## 2018-12-21 PROCEDURE — 74011250637 HC RX REV CODE- 250/637: Performed by: FAMILY MEDICINE

## 2018-12-21 PROCEDURE — 74176 CT ABD & PELVIS W/O CONTRAST: CPT

## 2018-12-21 PROCEDURE — 96372 THER/PROPH/DIAG INJ SC/IM: CPT

## 2018-12-21 PROCEDURE — 96367 TX/PROPH/DG ADDL SEQ IV INF: CPT

## 2018-12-21 PROCEDURE — 99285 EMERGENCY DEPT VISIT HI MDM: CPT

## 2018-12-21 PROCEDURE — 84443 ASSAY THYROID STIM HORMONE: CPT

## 2018-12-21 PROCEDURE — 74011250636 HC RX REV CODE- 250/636: Performed by: FAMILY MEDICINE

## 2018-12-21 PROCEDURE — 82803 BLOOD GASES ANY COMBINATION: CPT

## 2018-12-21 PROCEDURE — 93880 EXTRACRANIAL BILAT STUDY: CPT

## 2018-12-21 PROCEDURE — 82550 ASSAY OF CK (CPK): CPT

## 2018-12-21 PROCEDURE — 96365 THER/PROPH/DIAG IV INF INIT: CPT

## 2018-12-21 PROCEDURE — 85025 COMPLETE CBC W/AUTO DIFF WBC: CPT

## 2018-12-21 PROCEDURE — 83735 ASSAY OF MAGNESIUM: CPT

## 2018-12-21 RX ORDER — SODIUM CHLORIDE 9 MG/ML
75 INJECTION, SOLUTION INTRAVENOUS CONTINUOUS
Status: DISCONTINUED | OUTPATIENT
Start: 2018-12-21 | End: 2018-12-23 | Stop reason: HOSPADM

## 2018-12-21 RX ORDER — ACETAMINOPHEN 325 MG/1
650 TABLET ORAL
Status: DISCONTINUED | OUTPATIENT
Start: 2018-12-21 | End: 2018-12-23 | Stop reason: HOSPADM

## 2018-12-21 RX ORDER — CARVEDILOL 12.5 MG/1
25 TABLET ORAL 2 TIMES DAILY WITH MEALS
Status: DISCONTINUED | OUTPATIENT
Start: 2018-12-21 | End: 2018-12-23 | Stop reason: HOSPADM

## 2018-12-21 RX ORDER — HEPARIN SODIUM 5000 [USP'U]/ML
5000 INJECTION, SOLUTION INTRAVENOUS; SUBCUTANEOUS EVERY 8 HOURS
Status: DISCONTINUED | OUTPATIENT
Start: 2018-12-21 | End: 2018-12-23 | Stop reason: HOSPADM

## 2018-12-21 RX ORDER — AMLODIPINE BESYLATE 5 MG/1
5 TABLET ORAL DAILY
Status: DISCONTINUED | OUTPATIENT
Start: 2018-12-22 | End: 2018-12-23 | Stop reason: HOSPADM

## 2018-12-21 RX ORDER — LOSARTAN POTASSIUM 50 MG/1
100 TABLET ORAL DAILY
Status: DISCONTINUED | OUTPATIENT
Start: 2018-12-22 | End: 2018-12-23 | Stop reason: HOSPADM

## 2018-12-21 RX ORDER — MAGNESIUM SULFATE 100 %
4 CRYSTALS MISCELLANEOUS AS NEEDED
Status: DISCONTINUED | OUTPATIENT
Start: 2018-12-21 | End: 2018-12-23 | Stop reason: HOSPADM

## 2018-12-21 RX ORDER — SODIUM CHLORIDE 0.9 % (FLUSH) 0.9 %
5-10 SYRINGE (ML) INJECTION EVERY 8 HOURS
Status: DISCONTINUED | OUTPATIENT
Start: 2018-12-21 | End: 2018-12-23 | Stop reason: HOSPADM

## 2018-12-21 RX ORDER — SODIUM CHLORIDE 0.9 % (FLUSH) 0.9 %
5-10 SYRINGE (ML) INJECTION AS NEEDED
Status: DISCONTINUED | OUTPATIENT
Start: 2018-12-21 | End: 2018-12-23 | Stop reason: HOSPADM

## 2018-12-21 RX ORDER — MAGNESIUM SULFATE HEPTAHYDRATE 40 MG/ML
2 INJECTION, SOLUTION INTRAVENOUS ONCE
Status: COMPLETED | OUTPATIENT
Start: 2018-12-21 | End: 2018-12-21

## 2018-12-21 RX ORDER — DEXTROSE 50 % IN WATER (D50W) INTRAVENOUS SYRINGE
25-50 AS NEEDED
Status: DISCONTINUED | OUTPATIENT
Start: 2018-12-21 | End: 2018-12-23 | Stop reason: HOSPADM

## 2018-12-21 RX ORDER — ASPIRIN 81 MG/1
81 TABLET ORAL DAILY
Status: DISCONTINUED | OUTPATIENT
Start: 2018-12-22 | End: 2018-12-23 | Stop reason: HOSPADM

## 2018-12-21 RX ORDER — INSULIN LISPRO 100 [IU]/ML
INJECTION, SOLUTION INTRAVENOUS; SUBCUTANEOUS
Status: DISCONTINUED | OUTPATIENT
Start: 2018-12-21 | End: 2018-12-23 | Stop reason: HOSPADM

## 2018-12-21 RX ADMIN — CARVEDILOL 25 MG: 12.5 TABLET, FILM COATED ORAL at 19:10

## 2018-12-21 RX ADMIN — INSULIN LISPRO 1 UNITS: 100 INJECTION, SOLUTION INTRAVENOUS; SUBCUTANEOUS at 23:12

## 2018-12-21 RX ADMIN — HEPARIN SODIUM 5000 UNITS: 5000 INJECTION INTRAVENOUS; SUBCUTANEOUS at 19:11

## 2018-12-21 RX ADMIN — Medication 10 ML: at 23:13

## 2018-12-21 RX ADMIN — SODIUM CHLORIDE 500 ML: 900 INJECTION, SOLUTION INTRAVENOUS at 14:59

## 2018-12-21 RX ADMIN — Medication 10 ML: at 18:00

## 2018-12-21 RX ADMIN — PIPERACILLIN SODIUM, TAZOBACTAM SODIUM 3.38 G: 3; .375 INJECTION, POWDER, LYOPHILIZED, FOR SOLUTION INTRAVENOUS at 23:11

## 2018-12-21 RX ADMIN — MAGNESIUM SULFATE HEPTAHYDRATE 2 G: 40 INJECTION, SOLUTION INTRAVENOUS at 22:05

## 2018-12-21 RX ADMIN — SODIUM CHLORIDE 75 ML/HR: 900 INJECTION, SOLUTION INTRAVENOUS at 19:01

## 2018-12-21 NOTE — ED PROVIDER NOTES
76 y.o. male with past medical history significant for CAD, HTN, DMT2, gout, and heart failure who presents from [place] via EMS with chief complaint of syncope. Patient states he was sitting in a chair in [place] when he had sudden onset of lightheadedness, diaphoresis, and fatigue and states he \"shut his eyes for a second\" and woke up on the floor. Patient endorses onlookers witnessed the patient slide out of his chair with head trauma and LOC for two minutes. Patient arrives [facility] ED via EMS and reports no current pain or discomfort, and states his aforementioned symptoms have since resolved. Upon examination, patient's blood pressure 133/89. Patient has history of CABG, CAD, and CHF. Patient denies urinary or bowel incontinence during the syncopal episode. Patient denies recent changes in medications. Pt denies fever, chills, cough, congestion, shortness of breath, chest pain, abdominal pain, nausea, vomiting, diarrhea, blood in stool, difficulty with urination or dysuria. There are no other acute medical concerns at this time. Social hx: Current smoker (0.5 packs/day); No EtOH use  PCP: Jake Noble MD  Cardiologist: Marisa Nichols MD    Note written by Ilsa Rivero, as dictated by Luis Fernando Flores MD 11:41 AM        The history is provided by the patient, medical records and the EMS personnel. No  was used.         Past Medical History:   Diagnosis Date    CAD (coronary artery disease)     Chronic systolic HF (heart failure) (HCC)     DM type 2 (diabetes mellitus, type 2) (HCC)     Gout     HTN (hypertension)     Hypercholesterolemia        Past Surgical History:   Procedure Laterality Date    HX CORONARY ARTERY BYPASS GRAFT  2005         Family History:   Problem Relation Age of Onset    Heart Disease Father     Heart Attack Father     Hypertension Brother        Social History     Socioeconomic History    Marital status:      Spouse name: Not on file    Number of children: Not on file    Years of education: Not on file    Highest education level: Not on file   Social Needs    Financial resource strain: Not on file    Food insecurity - worry: Not on file    Food insecurity - inability: Not on file    Transportation needs - medical: Not on file   L2 needs - non-medical: Not on file   Occupational History    Not on file   Tobacco Use    Smoking status: Current Every Day Smoker     Packs/day: 0.50     Years: 40.00     Pack years: 20.00    Smokeless tobacco: Never Used   Substance and Sexual Activity    Alcohol use: No    Drug use: No    Sexual activity: Not on file   Other Topics Concern    Not on file   Social History Narrative    Not on file         ALLERGIES: Patient has no known allergies. Review of Systems   Constitutional: Negative for activity change, appetite change, chills, fatigue and fever. HENT: Negative for congestion, ear pain, facial swelling, sore throat and trouble swallowing. Eyes: Negative for pain, discharge and visual disturbance. Respiratory: Negative for chest tightness, shortness of breath and wheezing. Cardiovascular: Negative for chest pain and palpitations. Gastrointestinal: Negative for abdominal pain, blood in stool, diarrhea, nausea and vomiting. Genitourinary: Negative for difficulty urinating, dysuria, flank pain and hematuria. Musculoskeletal: Negative for arthralgias, joint swelling, myalgias and neck pain. Skin: Negative for color change and rash. Neurological: Positive for syncope. Negative for dizziness, weakness, numbness and headaches. Hematological: Negative for adenopathy. Does not bruise/bleed easily. Psychiatric/Behavioral: Negative for behavioral problems, confusion and sleep disturbance. All other systems reviewed and are negative.       Vitals:    12/21/18 1141   BP: 133/89   Pulse: 70   Resp: 16   SpO2: 96%            Physical Exam Constitutional: He is oriented to person, place, and time. He appears well-developed and well-nourished. No distress. HENT:   Head: Normocephalic and atraumatic. Nose: Nose normal.   Mouth/Throat: Oropharynx is clear and moist.   Eyes: Conjunctivae and EOM are normal. Pupils are equal, round, and reactive to light. No scleral icterus. Neck: Normal range of motion. Neck supple. No JVD present. No tracheal deviation present. No thyromegaly present. No carotid bruits noted. Cardiovascular: Normal rate, regular rhythm, normal heart sounds and intact distal pulses. Exam reveals no gallop and no friction rub. No murmur heard. Pulmonary/Chest: Effort normal and breath sounds normal. No respiratory distress. He has no wheezes. He has no rales. He exhibits no tenderness. Abdominal: Soft. Bowel sounds are normal. He exhibits no distension and no mass. There is no tenderness. There is no rebound and no guarding. Musculoskeletal: Normal range of motion. He exhibits no edema or tenderness. Lymphadenopathy:     He has no cervical adenopathy. Neurological: He is alert and oriented to person, place, and time. He has normal reflexes. No cranial nerve deficit. Coordination normal.   Skin: Skin is warm and dry. No rash noted. No erythema. Psychiatric: He has a normal mood and affect. His behavior is normal. Judgment and thought content normal.   Nursing note and vitals reviewed.   Note written by Ilsa Estrada, as dictated by Sai Shannon MD 11:41 AM       MDM  Number of Diagnoses or Management Options     Amount and/or Complexity of Data Reviewed  Clinical lab tests: ordered and reviewed  Tests in the radiology section of CPT®: ordered and reviewed  Decide to obtain previous medical records or to obtain history from someone other than the patient: yes  Review and summarize past medical records: yes  Independent visualization of images, tracings, or specimens: yes    Risk of Complications, Morbidity, and/or Mortality  Presenting problems: moderate  Diagnostic procedures: moderate  Management options: moderate    Patient Progress  Patient progress: stable         Procedures   ED MD  EKG interpretation: There is a normal sinus rhythm at 67 beats a minute. Left axis deviation is noted. T wave inversion is noted laterally. Intervals are normal. Poor R wave progression. No other acute findings are noted. Unchanged from EKG in Aug. 2018. Luisa Cabrera MD    PROGRESS NOTE:  1:45 PM Provider with patient at bedside reviewing labs, will admit patient for further work up to figure out why he blacked out. CONSULT NOTE:  1:52 PM Leesa Kebede MD communicated with Dr. Hannah Andres, Consult for Hospitalist via [facility] Text. Discussed available diagnostic tests and clinical findings. Dr. Singh Conklin will evaluate patient for possible admission. Dr. Singh Conklin will admit patient.

## 2018-12-21 NOTE — ED NOTES
Bedside and Verbal shift change report given to Kasey Jack RN (oncoming nurse) by Antonio Noe RN (offgoing nurse). Report included the following information SBAR and ED Summary.

## 2018-12-21 NOTE — PROGRESS NOTES
Admission Medication Reconciliation:    Information obtained from: Patient's wife via phone    Significant PMH/Disease States:   Past Medical History:   Diagnosis Date    CAD (coronary artery disease)     Chronic systolic HF (heart failure) ([facility]     DM type 2 (diabetes mellitus, type 2) ([facility]     Gout     HTN (hypertension)     Hypercholesterolemia        Chief Complaint for this Admission:  Syncope    Allergies:  Patient has no known allergies. Prior to Admission Medications:   Prior to Admission Medications   Prescriptions Last Dose Informant Patient Reported? Taking? JANUMET 50-1,000 mg per tablet 12/21/2018  Yes Yes   Sig: Take 1 Tab by mouth two (2) times daily (with meals). amLODIPine (NORVASC) 5 mg tablet 12/21/2018  Yes Yes   Sig: Take 5 mg by mouth daily. aspirin delayed-release 81 mg tablet 12/21/2018  Yes Yes   Sig: Take 81 mg by mouth daily. atorvastatin (LIPITOR) 10 mg tablet 12/21/2018  No Yes   Sig: Take 1 Tab by mouth daily. carvedilol (COREG) 25 mg tablet 12/21/2018  Yes Yes   Sig: Take 25 mg by mouth two (2) times daily (with meals). furosemide (LASIX) 20 mg tablet 12/21/2018  Yes Yes   Sig: Take 20 mg by mouth daily. irbesartan (AVAPRO) 300 mg tablet 12/21/2018  Yes Yes   Sig: Take 300 mg by mouth nightly. multivitamin (ONE A DAY) tablet 12/21/2018  Yes Yes   Sig: Take 1 Tab by mouth daily. potassium chloride (KLOR-CON M10) 10 mEq tablet 12/21/2018  Yes Yes   Sig: Take 10 mEq by mouth two (2) times a day.       Facility-Administered Medications: None         Comments/Recommendations: Removed Vitamin D, metformin, vitamin E, Adan Roam

## 2018-12-21 NOTE — H&P
[address]   HISTORY AND PHYSICAL      Koffi Hall  MR#: [id number]  : 1950  ACCOUNT #: [de-identified]   ADMIT DATE: 2018    CHIEF COMPLAINT:  Syncope. HISTORY OF PRESENT ILLNESS:  The patient is a 78-year-old male with past medical history of coronary artery disease, hypertension, diabetes mellitus type 2, gout, dyslipidemia,  who presents to the hospital with the above-mentioned symptoms. The patient reports that for the past 3 days he has been having some loose stools. Patient reports that he has had 3-4 bowel movements on a daily basis  watery. Denies any . Denies any antibiotic use recently. Reports that his son had been sick. Denies any nausea, vomiting associated with his symptoms. Denies any abdominal pain. The patient reports today he was at [facility], went to the bathroom, had a bowel movement, came out and had sudden loss of consciousness. The patient reports that bystanders told him that he was \"out for 2 minutes\". Patient reports that he did hit his head on the porch step. He has never had syncope before. He has never had seizures before. Reports that bystanders told him that he was . EMS was called. The patient's blood pressure was 133/89. Patient reports that he was not incontinent during the episode. The patient was brought to the hospital and was requested to be admitted under the hospitalist service. The patient denies any headache, blurry vision, sore throat, trouble swallowing, trouble with speech, chest pain, shortness of breath, cough, fever, chills, abdominal pain, constipation, diarrhea, urinary symptoms, focal or generalized neurological weakness, recent travel, sick contacts, falls, injuries, hematemesis, melena, hemoptysis or hematuria. Patient denies any other complaints or problems. PAST MEDICAL HISTORY:  See above.      HOME MEDICATIONS:  Currently, the patient is on Janumet  mg daily b.i.d., Avapro 300 mg nightly, multivitamin 1 tablet daily, Lipitor 1 tablet daily, aspirin 81 mg daily, Coreg 25 mg daily b.i.d., potassium chloride, Lasix 20 mg daily and amlodipine 5 mg daily. SOCIAL HISTORY:  Current every day smoker, smokes half a pack per day for 40 years. No alcohol, no IV drug abuse. Lives at home. FAMILY HISTORY:  Father has a history of heart disease. Father has a history of heart attack and brother has a history of hypertension. ALLERGIES:  NO KNOWN DRUG ALLERGIES. REVIEW OF SYSTEMS:  All systems were reviewed and were found to be essentially negative except for the symptoms mentioned above. PHYSICAL EXAMINATION:  VITAL SIGNS:  Temperature is pending, pulse 68, respiratory rate 15, blood pressure 131/86, pulse ox 95% on room air. GENERAL:  Alert x3, awake, no acute distress, resting in bed, pleasant male, appears to be stated age. HEENT:  Pupils equal, reactive to light. Dry mucous membranes. Tympanic membranes clear. NECK:  Supple. CHEST:  Clear to auscultation bilaterally. COR:  S1, S2 heard. ABDOMEN:  Soft, nontender, nondistended. Bowel sounds are physiologic. EXTREMITIES:  No clubbing, no cyanosis, no edema. NEUROPSYCHIATRIC:  Pleasant mood and affect. Cranial nerves II-XII grossly intact. Sensory grossly within normal limits. DTRs 2+/4. Strength 5/5. SKIN:  Warm. LABORATORY DATA:  White count 5.6, hemoglobin 15.6, hematocrit 46.8, platelets 074. Sodium 139, potassium 3.5, chloride 105, bicarbonate 25, anion gap 9, glucose of 283. BUN 10, creatinine 1.12, calcium 8.5, bilirubin total 0.98, ALT 23, AST 19, alkaline phosphatase 110, lactate is 2.7. Troponin less than 0.05. CT of the head shows normal  without any acute abnormalities. X-ray of the chest shows no acute abnormality. EKG shows normal sinus rhythm, left axis deviation, no acute ST abnormalities. ASSESSMENT AND PLAN:  1. Syncope, unclear etiology.   Appears to be a  patient will be admitted on telemetry bed.  We will provide gentle IV hydration, monitor on telemetry, troponins, . Will get B12 and folate levels. Will get carotid duplex, echocardiogram, orthostatic vitals and further intervention per hospital course. If symptoms persist, will consider further intervention and diagnostics including Cardiology consult. I put the patient on fall precautions, further intervention per hospital course. 2.  History of congestive heart failure. Currently, patient appears to be volume contracted. Will proved gentle IV hydration  strict I's and O's and continue to closely monitor. Further intervention per hospital course. Continue home medication. 3.  Diarrhea. We will get stool cultures and CT of the abdomen and pelvis. Continue to closely monitor. If symptoms persist, may consider further intervention and diagnostics  gentle hydration and supportive care. 4.  Diabetes, poorly controlled. The patient will be on sliding scale NovoLog insulin, Accu-Cheks, diet control and close monitoring, will hold metformin. 5.  Lactic acidosis. No obvious signs of acute infection. The patient  white count. Does not appear to be febrile. Does not appear to be infected, thus will hold antibiotics for now. CT of the abdomen has been ordered. A urinalysis has been ordered. Chest x-ray does not show any acute pathology. Will repeat  in 4 hours. Gentle IV hydration, will continue to closely monitor. Reassess as needed, further intervention per hospital course. 6.  Hypertension. Continue home medications . 7.  Gastrointestinal and deep venous thrombosis prophylaxis. Patient will be on heparin.       Yael Kang MD       MM/PN  D: 12/21/2018 15:00     T: 12/21/2018 16:29  JOB #: [id number]

## 2018-12-21 NOTE — PROCEDURES
[facility]  *** FINAL REPORT ***    Name: Etta Batres  MRN: [id number]    Outpatient  : 21 Sep 1950  HIS Order #: [id number]  [address] [facility] Visit #: [id number]  Date: 21 Dec 2018    TYPE OF TEST: Cerebrovascular Duplex    REASON FOR TEST  Syncope    Right Carotid:-             Proximal               Mid                 Distal  cm/s  Systolic  Diastolic  Systolic  Diastolic  Systolic  Diastolic  CCA:     08.3      23.2                            78.8      24.9  Bulb:  ECA:     75.5      11.7  ICA:     48.6      21.7       44.2      21.7       52.4      25.3  ICA/CCA:  0.6       0.9    ICA Stenosis:    Right Vertebral:-  Finding: Antegrade  Sys:       28.2  Mandie:       11.1    Right Subclavian:    Left Carotid:-            Proximal                Mid                 Distal  cm/s  Systolic  Diastolic  Systolic  Diastolic  Systolic  Diastolic  CCA:     06.5      24.9                            76.6      26.0  Bulb:  ECA:     74.5      20.2  ICA:     45.3      22.5       55.9      31.0       30.6      15.2  ICA/CCA:  0.6       0.9    ICA Stenosis:    Left Vertebral:-  Finding: Antegrade  Sys:       34.3  Mandie:       13.7    Left Subclavian:    INTERPRETATION/FINDINGS  PROCEDURE:  Color duplex ultrasound imaging of extracranial  cerebrovascular arteries. FINDINGS:       Right:  Internal carotid velocity is within normal limits, there  is no observed narrowing of the flow channel on color Doppler imaging,   and no plaque is visualized on B-mode imaging. The common and  external carotid arteries are patent and without evidence of  hemodynamically significant stenosis. Left:  Internal carotid velocity is within normal limits, there  is no observed narrowing of the flow channel on color Doppler imaging,   and no plaque is visualized on B-mode imaging. The common and  external carotid arteries are patent and without evidence of  hemodynamically significant stenosis.     IMPRESSION:  Consistent with no stenosis of the right internal carotid   and no stenosis of the left internal carotid. Vertebrals are patent  with antegrade flow. ADDITIONAL COMMENTS    I have personally reviewed the data relevant to the interpretation of  this  study.     TECHNOLOGIST: Mima Ayala  Signed: 12/21/2018 05:04 PM    PHYSICIAN: Bruna Moore MD  Signed: 12/21/2018 08:06 PM

## 2018-12-21 NOTE — ROUTINE PROCESS
TRANSFER - OUT REPORT:    Verbal report given to Brooke Melo RN (name) on Elisa Murray  being transferred to [employer] (unit) for routine progression of care       Report consisted of patients Situation, Background, Assessment and   Recommendations(SBAR). Information from the following report(s) SBAR, ED Summary, [facility] and Recent Results was reviewed with the receiving nurse. Lines:   Peripheral IV 12/21/18 Left Antecubital (Active)        Opportunity for questions and clarification was provided.       Patient transported with:   Monitor  Tech

## 2018-12-21 NOTE — ED TRIAGE NOTES
Arrives via EMS from [address] [facility] for witnessed syncopal event with continence of bowel and bladder. Bystanders say pt was unresponsive x 2 minutes. Pt reports feeling extremely weak and diaphoretic prior to syncopal event. HX CABG, CAD, &CHF. A&Ox4, feeling baseline when EMS arrived and on arrival to ED.

## 2018-12-22 LAB
ANION GAP SERPL CALC-SCNC: 6 MMOL/L (ref 5–15)
APPEARANCE UR: CLEAR
BACTERIA URNS QL MICRO: NEGATIVE /HPF
BASOPHILS # BLD: 0 K/UL (ref 0–0.1)
BASOPHILS NFR BLD: 0 % (ref 0–1)
BILIRUB UR QL: NEGATIVE
BUN SERPL-MCNC: 11 MG/DL (ref 6–20)
BUN/CREAT SERPL: 11 (ref 12–20)
CALCIUM SERPL-MCNC: 8.3 MG/DL (ref 8.5–10.1)
CHLORIDE SERPL-SCNC: 106 MMOL/L (ref 97–108)
CK SERPL-CCNC: 488 U/L (ref 39–308)
CO2 SERPL-SCNC: 27 MMOL/L (ref 21–32)
COLOR UR: ABNORMAL
CREAT SERPL-MCNC: 1.01 MG/DL (ref 0.7–1.3)
DIFFERENTIAL METHOD BLD: ABNORMAL
EOSINOPHIL # BLD: 0.1 K/UL (ref 0–0.4)
EOSINOPHIL NFR BLD: 2 % (ref 0–7)
EPITH CASTS URNS QL MICRO: ABNORMAL /LPF
ERYTHROCYTE [DISTWIDTH] IN BLOOD BY AUTOMATED COUNT: 13 % (ref 11.5–14.5)
GLUCOSE BLD STRIP.AUTO-MCNC: 203 MG/DL (ref 65–100)
GLUCOSE BLD STRIP.AUTO-MCNC: 203 MG/DL (ref 65–100)
GLUCOSE BLD STRIP.AUTO-MCNC: 233 MG/DL (ref 65–100)
GLUCOSE BLD STRIP.AUTO-MCNC: 263 MG/DL (ref 65–100)
GLUCOSE SERPL-MCNC: 190 MG/DL (ref 65–100)
GLUCOSE UR STRIP.AUTO-MCNC: 250 MG/DL
HCT VFR BLD AUTO: 41.5 % (ref 36.6–50.3)
HGB BLD-MCNC: 14 G/DL (ref 12.1–17)
HGB UR QL STRIP: NEGATIVE
HYALINE CASTS URNS QL MICRO: ABNORMAL /LPF (ref 0–5)
IMM GRANULOCYTES # BLD: 0 K/UL (ref 0–0.04)
IMM GRANULOCYTES NFR BLD AUTO: 0 % (ref 0–0.5)
KETONES UR QL STRIP.AUTO: NEGATIVE MG/DL
LACTATE SERPL-SCNC: 1.9 MMOL/L (ref 0.4–2)
LEUKOCYTE ESTERASE UR QL STRIP.AUTO: NEGATIVE
LYMPHOCYTES # BLD: 1.9 K/UL (ref 0.8–3.5)
LYMPHOCYTES NFR BLD: 37 % (ref 12–49)
MAGNESIUM SERPL-MCNC: 2 MG/DL (ref 1.6–2.4)
MCH RBC QN AUTO: 28.2 PG (ref 26–34)
MCHC RBC AUTO-ENTMCNC: 33.7 G/DL (ref 30–36.5)
MCV RBC AUTO: 83.7 FL (ref 80–99)
MONOCYTES # BLD: 0.5 K/UL (ref 0–1)
MONOCYTES NFR BLD: 10 % (ref 5–13)
NEUTS SEG # BLD: 2.5 K/UL (ref 1.8–8)
NEUTS SEG NFR BLD: 50 % (ref 32–75)
NITRITE UR QL STRIP.AUTO: NEGATIVE
NRBC # BLD: 0 K/UL (ref 0–0.01)
NRBC BLD-RTO: 0 PER 100 WBC
PH UR STRIP: 6 [PH] (ref 5–8)
PLATELET # BLD AUTO: 127 K/UL (ref 150–400)
PMV BLD AUTO: 11.1 FL (ref 8.9–12.9)
POTASSIUM SERPL-SCNC: 3 MMOL/L (ref 3.5–5.1)
PROT UR STRIP-MCNC: NEGATIVE MG/DL
RBC # BLD AUTO: 4.96 M/UL (ref 4.1–5.7)
RBC #/AREA URNS HPF: ABNORMAL /HPF (ref 0–5)
SERVICE CMNT-IMP: ABNORMAL
SODIUM SERPL-SCNC: 139 MMOL/L (ref 136–145)
SP GR UR REFRACTOMETRY: 1.02 (ref 1–1.03)
TROPONIN I SERPL-MCNC: <0.05 NG/ML
UROBILINOGEN UR QL STRIP.AUTO: 1 EU/DL (ref 0.2–1)
WBC # BLD AUTO: 5 K/UL (ref 4.1–11.1)
WBC URNS QL MICRO: ABNORMAL /HPF (ref 0–4)

## 2018-12-22 PROCEDURE — 93306 TTE W/DOPPLER COMPLETE: CPT

## 2018-12-22 PROCEDURE — 74011000258 HC RX REV CODE- 258: Performed by: FAMILY MEDICINE

## 2018-12-22 PROCEDURE — 83735 ASSAY OF MAGNESIUM: CPT

## 2018-12-22 PROCEDURE — 82088 ASSAY OF ALDOSTERONE: CPT

## 2018-12-22 PROCEDURE — 85025 COMPLETE CBC W/AUTO DIFF WBC: CPT

## 2018-12-22 PROCEDURE — 74011250637 HC RX REV CODE- 250/637: Performed by: HOSPITALIST

## 2018-12-22 PROCEDURE — 96366 THER/PROPH/DIAG IV INF ADDON: CPT

## 2018-12-22 PROCEDURE — 82962 GLUCOSE BLOOD TEST: CPT

## 2018-12-22 PROCEDURE — 96361 HYDRATE IV INFUSION ADD-ON: CPT

## 2018-12-22 PROCEDURE — 96372 THER/PROPH/DIAG INJ SC/IM: CPT

## 2018-12-22 PROCEDURE — 74011250637 HC RX REV CODE- 250/637: Performed by: FAMILY MEDICINE

## 2018-12-22 PROCEDURE — 80048 BASIC METABOLIC PNL TOTAL CA: CPT

## 2018-12-22 PROCEDURE — 74011250636 HC RX REV CODE- 250/636: Performed by: HOSPITALIST

## 2018-12-22 PROCEDURE — 74011636637 HC RX REV CODE- 636/637: Performed by: FAMILY MEDICINE

## 2018-12-22 PROCEDURE — 36415 COLL VENOUS BLD VENIPUNCTURE: CPT

## 2018-12-22 PROCEDURE — 99218 HC RM OBSERVATION: CPT

## 2018-12-22 PROCEDURE — 74011250636 HC RX REV CODE- 250/636: Performed by: FAMILY MEDICINE

## 2018-12-22 PROCEDURE — 83605 ASSAY OF LACTIC ACID: CPT

## 2018-12-22 RX ORDER — POTASSIUM CHLORIDE 750 MG/1
40 TABLET, FILM COATED, EXTENDED RELEASE ORAL
Status: COMPLETED | OUTPATIENT
Start: 2018-12-22 | End: 2018-12-22

## 2018-12-22 RX ORDER — LANOLIN ALCOHOL/MO/W.PET/CERES
400 CREAM (GRAM) TOPICAL 2 TIMES DAILY
Status: DISCONTINUED | OUTPATIENT
Start: 2018-12-22 | End: 2018-12-23 | Stop reason: HOSPADM

## 2018-12-22 RX ORDER — POTASSIUM CHLORIDE 750 MG/1
40 TABLET, FILM COATED, EXTENDED RELEASE ORAL 2 TIMES DAILY
Status: COMPLETED | OUTPATIENT
Start: 2018-12-22 | End: 2018-12-22

## 2018-12-22 RX ORDER — POTASSIUM CHLORIDE 14.9 MG/ML
10 INJECTION INTRAVENOUS
Status: COMPLETED | OUTPATIENT
Start: 2018-12-22 | End: 2018-12-22

## 2018-12-22 RX ADMIN — Medication 400 MG: at 18:56

## 2018-12-22 RX ADMIN — PIPERACILLIN SODIUM, TAZOBACTAM SODIUM 3.38 G: 3; .375 INJECTION, POWDER, LYOPHILIZED, FOR SOLUTION INTRAVENOUS at 06:59

## 2018-12-22 RX ADMIN — Medication 10 ML: at 23:42

## 2018-12-22 RX ADMIN — INSULIN LISPRO 2 UNITS: 100 INJECTION, SOLUTION INTRAVENOUS; SUBCUTANEOUS at 22:00

## 2018-12-22 RX ADMIN — CARVEDILOL 25 MG: 12.5 TABLET, FILM COATED ORAL at 08:54

## 2018-12-22 RX ADMIN — CARVEDILOL 25 MG: 12.5 TABLET, FILM COATED ORAL at 18:56

## 2018-12-22 RX ADMIN — HEPARIN SODIUM 5000 UNITS: 5000 INJECTION INTRAVENOUS; SUBCUTANEOUS at 03:20

## 2018-12-22 RX ADMIN — SODIUM CHLORIDE 75 ML/HR: 900 INJECTION, SOLUTION INTRAVENOUS at 23:42

## 2018-12-22 RX ADMIN — Medication 10 ML: at 17:03

## 2018-12-22 RX ADMIN — POTASSIUM CHLORIDE 10 MEQ: 200 INJECTION, SOLUTION INTRAVENOUS at 11:15

## 2018-12-22 RX ADMIN — POTASSIUM CHLORIDE 40 MEQ: 750 TABLET, EXTENDED RELEASE ORAL at 05:16

## 2018-12-22 RX ADMIN — INSULIN LISPRO 2 UNITS: 100 INJECTION, SOLUTION INTRAVENOUS; SUBCUTANEOUS at 06:59

## 2018-12-22 RX ADMIN — AMLODIPINE BESYLATE 5 MG: 5 TABLET ORAL at 08:54

## 2018-12-22 RX ADMIN — POTASSIUM CHLORIDE 40 MEQ: 750 TABLET, EXTENDED RELEASE ORAL at 11:16

## 2018-12-22 RX ADMIN — HEPARIN SODIUM 5000 UNITS: 5000 INJECTION INTRAVENOUS; SUBCUTANEOUS at 11:15

## 2018-12-22 RX ADMIN — INSULIN LISPRO 2 UNITS: 100 INJECTION, SOLUTION INTRAVENOUS; SUBCUTANEOUS at 17:02

## 2018-12-22 RX ADMIN — POTASSIUM CHLORIDE 10 MEQ: 200 INJECTION, SOLUTION INTRAVENOUS at 12:30

## 2018-12-22 RX ADMIN — POTASSIUM CHLORIDE 40 MEQ: 750 TABLET, EXTENDED RELEASE ORAL at 18:56

## 2018-12-22 RX ADMIN — Medication 400 MG: at 09:09

## 2018-12-22 RX ADMIN — HEPARIN SODIUM 5000 UNITS: 5000 INJECTION INTRAVENOUS; SUBCUTANEOUS at 18:56

## 2018-12-22 RX ADMIN — LOSARTAN POTASSIUM 100 MG: 50 TABLET ORAL at 08:54

## 2018-12-22 RX ADMIN — INSULIN LISPRO 2 UNITS: 100 INJECTION, SOLUTION INTRAVENOUS; SUBCUTANEOUS at 11:27

## 2018-12-22 RX ADMIN — POTASSIUM CHLORIDE 10 MEQ: 200 INJECTION, SOLUTION INTRAVENOUS at 09:09

## 2018-12-22 RX ADMIN — ASPIRIN 81 MG: 81 TABLET, COATED ORAL at 08:54

## 2018-12-22 NOTE — PROGRESS NOTES
Hospitalist Progress Note  Sharon Hunter MD  Answering service: [phone] OR [phone] from in house phone        Date of Service:  2018  NAME:  Pa Roman  :  1950  MRN:  [id number]      Admission Summary:   The patient is a 80-year-old male with past medical history of coronary artery disease, hypertension, diabetes mellitus type 2, gout, dyslipidemia,who presents to the hospital with Syncope. The patient reports that for the past 3 days he has been having some loose stools. Patient reports that he has had 3-4 bowel movements on a daily basis   watery    Interval history / Subjective:   Pt doing well , no complains now hydrated and lactate WNL      Assessment & Plan:     1. Syncope, unclear etiology. - may be related to chronic watery stool with severe depletion of mag  - Gentle IV hydration and he is not orthostatic  -  If symptoms persist, will consider further intervention and diagnostics including Cardiology consult. I put the patient on fall precautions, further intervention per hospital course.    - PT/OT initial head CT and abd CT negative for acute issues    2. History of chronic systolic congestive heart failure. NYHA 1   - Currently, patient appears to be volume contracted. Will proved gentle IV hydration   -  strict I's and O's and continue to closely monitor.    - Further intervention per hospital course. Continue home medication. - on BB and ACE    3. Diarrhea. - CT of the abdomen and pelvis WNL  -  Continue to closely monitor. If symptoms persist, may be viral etiology WBC count normal no fever    4. Diabetes, poorly controlled. The patient will be on sliding scale NovoLog insulin, Accu-Cheks, diet control and close monitoring, will hold metformin.  - A1C 9    5. Lactic acidosis. No obvious signs of acute infection.    -The patient has normal  white count. Does not appear to be febrile.   Does not appear to be infected, thus will hold antibiotics for now. Chest x-ray does not show any acute pathology. - resolved with IVF from volume contraction    6. Hypertension. Continue home medications     7. Hypokalemia- replete    8. Severe hypomagnesemia due to diarrhea - replete    Code status: Full  DVT prophylaxis: SCD    Care Plan discussed with: Patient/Family and Nurse  Disposition: TBD     Hospital Problems  Date Reviewed: 12/21/2018          Codes Class Noted POA    * (Principal) Syncope ICD-10-CM: R55  ICD-9-CM: 780.2  12/21/2018 Unknown                Review of Systems:   A comprehensive review of systems was negative. Vital Signs:    Last 24hrs VS reviewed since prior progress note. Most recent are:  Visit Vitals  /79 (BP 1 Location: Left arm, BP Patient Position: At rest)   Pulse 67   Temp 98 °F (36.7 °C)   Resp 18   Ht 5' 9\" (1.753 m)   Wt 95.3 kg (210 lb 1.6 oz)   SpO2 96%   BMI 31.03 kg/m²         Intake/Output Summary (Last 24 hours) at 12/22/2018 1704  Last data filed at 12/22/2018 0347  Gross per 24 hour   Intake 135 ml   Output 400 ml   Net -265 ml        Physical Examination:             Constitutional:  No acute distress, cooperative,    ENT:  Oral mucous moist,   Resp:  CTA bilaterally. CV:  Regular rhythm, normal rate,     GI:  Soft, non distended, non tender. bs+    Musculoskeletal:  No edema, warm, 2+ pulses throughout    Neurologic:  Moves all extremities. AAOx3, CN II-XII reviewed     Psych:  Good insight, Not anxious nor agitated. Data Review:    I personally reviewed  Image and labs    Ct Head Wo Cont    Result Date: 12/21/2018  IMPRESSION: Normal CT scan of the head without contrast     Ct Abd Pelv Wo Cont    Result Date: 12/21/2018  IMPRESSION: No acute abdominal or pelvic process seen.     Xr Chest Port    Result Date: 12/21/2018  IMPRESSION: No acute finding    Labs:     Recent Labs     12/22/18  0334 12/21/18  1148   WBC 5.0 5.6   HGB 14.0 15.6   HCT 41.5 46.8 * 141*     Recent Labs     12/22/18  0334 12/21/18  2057 12/21/18  1845 12/21/18  1148     --   --  139   K 3.0*  --   --  3.5     --   --  105   CO2 27  --   --  25   BUN 11  --   --  10   CREA 1.01  --   --  1.12   *  --   --  283*   CA 8.3*  --   --  8.5   MG 2.0 1.4* 0.3*  --      Recent Labs     12/21/18  1148   SGOT 19   ALT 33      TBILI 0.9   TP 6.9   ALB 3.3*   GLOB 3.6     Recent Labs     12/21/18  1148   INR 1.2*   PTP 12.0*   APTT 26.8      No results for input(s): FE, TIBC, PSAT, FERR in the last 72 hours. Lab Results   Component Value Date/Time    Folate 19.1 12/21/2018 11:48 AM      No results for input(s): PH, PCO2, PO2 in the last 72 hours.   Recent Labs     12/21/18  2346 12/21/18  1845 12/21/18  1148   * 112  --    TROIQ <0.05 <0.05 <0.05     Lab Results   Component Value Date/Time    Cholesterol, total 148 02/20/2018 10:21 AM    HDL Cholesterol 30 (L) 02/20/2018 10:21 AM    LDL, calculated 64 02/20/2018 10:21 AM    Triglyceride 269 (H) 02/20/2018 10:21 AM     Lab Results   Component Value Date/Time    Glucose (POC) 203 (H) 12/22/2018 06:54 AM    Glucose (POC) 239 (H) 12/21/2018 10:23 PM     Lab Results   Component Value Date/Time    Color YELLOW/STRAW 12/21/2018 11:49 PM    Appearance CLEAR 12/21/2018 11:49 PM    Specific gravity 1.017 12/21/2018 11:49 PM    pH (UA) 6.0 12/21/2018 11:49 PM    Protein NEGATIVE  12/21/2018 11:49 PM    Glucose 250 (A) 12/21/2018 11:49 PM    Ketone NEGATIVE  12/21/2018 11:49 PM    Bilirubin NEGATIVE  12/21/2018 11:49 PM    Urobilinogen 1.0 12/21/2018 11:49 PM    Nitrites NEGATIVE  12/21/2018 11:49 PM    Leukocyte Esterase NEGATIVE  12/21/2018 11:49 PM    Epithelial cells FEW 12/21/2018 11:49 PM    Bacteria NEGATIVE  12/21/2018 11:49 PM    WBC 0-4 12/21/2018 11:49 PM    RBC 0-5 12/21/2018 11:49 PM         Medications Reviewed:     Current Facility-Administered Medications   Medication Dose Route Frequency    potassium chloride 10 mEq in 50 ml IVPB  10 mEq IntraVENous Q1H    potassium chloride SR (KLOR-CON 10) tablet 40 mEq  40 mEq Oral BID    magnesium oxide (MAG-OX) tablet 400 mg  400 mg Oral BID    amLODIPine (NORVASC) tablet 5 mg  5 mg Oral DAILY    aspirin delayed-release tablet 81 mg  81 mg Oral DAILY    carvedilol (COREG) tablet 25 mg  25 mg Oral BID WITH MEALS    losartan (COZAAR) tablet 100 mg  100 mg Oral DAILY    sodium chloride (NS) flush 5-10 mL  5-10 mL IntraVENous Q8H    sodium chloride (NS) flush 5-10 mL  5-10 mL IntraVENous PRN    glucose chewable tablet 16 g  4 Tab Oral PRN    dextrose (D50W) injection syrg 12.5-25 g  25-50 mL IntraVENous PRN    glucagon (GLUCAGEN) injection 1 mg  1 mg IntraMUSCular PRN    0.9% sodium chloride infusion  75 mL/hr IntraVENous CONTINUOUS    acetaminophen (TYLENOL) tablet 650 mg  650 mg Oral Q4H PRN    heparin (porcine) injection 5,000 Units  5,000 Units SubCUTAneous Q8H    insulin lispro (HUMALOG) injection   SubCUTAneous AC&HS    piperacillin-tazobactam (ZOSYN) 3.375 g in 0.9% sodium chloride (MBP/ADV) 100 mL  3.375 g IntraVENous Q8H     ______________________________________________________________________  EXPECTED LENGTH OF STAY: - - -  ACTUAL LENGTH OF STAY:          0                 Paige Roque MD

## 2018-12-22 NOTE — PROGRESS NOTES
Bedside shift change report given to Charity Renae RN  (oncoming nurse) by Karma Castillo RN (offgoing nurse). Report included the following information SBAR, Kardex, MAR, Recent Results and Cardiac Rhythm NSR with PVCs.

## 2018-12-22 NOTE — PROGRESS NOTES
Pt is A&Ox4. Afebrile. VSS. Pt has had no c/o pain or dizziness overnight. Orthostatic BP completed, recorded in flowsheet. Lactic acid this am 1.9. K was 3.0, replaced with 40 mEq PO. Will recheck BMP this am. Will continue to monitor.

## 2018-12-22 NOTE — PROGRESS NOTES
Spoke with Dr Barrett Sotelo from nephrology  D/w him the labs, clinical findings and HPI  He recommends repeating mag and lactate level and giving 4 gm of mag sulphate if still same value  Tele monitoring

## 2018-12-22 NOTE — CONSULTS
Consult noted   Will see him shortly     [facility]  Office :[phone]  Fax: [phone]

## 2018-12-22 NOTE — PROGRESS NOTES
Day #1 of Zosyn  Indication:  Sepsis  Current regimen:  3.375 g IV q 6 h (30 min infusion)  Abx regimen: monotherapy  Recent Labs     18  1148   WBC 5.6   CREA 1.12   BUN 10     Est CrCl: 71.9 ml/min; UO: - ml/kg/hr  Temp (24hrs), Av.1 °F (36.7 °C), Min:97.9 °F (36.6 °C), Max:98.4 °F (36.9 °C)    Cultures: no micro pending    Plan: Change to 3.375 g IV q 8 h (4 hour infusion)

## 2018-12-22 NOTE — CONSULTS
[facility]   [address] [facility], [address] [place] [address], [facility]  Phone: [phone] NOTE     Patient: Jermaine Beard MRN: [id number]  PCP: Henri Adame MD   :     1950  Age:   76 y.o. Sex:  male      Referring physician: Leeroy Amaro MD  Reason for consultation: 76 y.o. male with Syncope  Syncope complicated by SILVIO   Admission Date: 2018 11:36 AM  LOS: 0 days      ASSESSMENT and PLAN :   Severe Hypomagnesemia :  - suspect had chronic Hypomagnesemia since starting loop diuretics   - Exacerbated by diarrheal illness   - exclude underlying Gitelmans syndrome   - improved w/ replacement   - continue oral Mg on d.c     Hypokalemia :  - chronic loops + diarrheal illness  - he has been on max dose of Losartan and this should nt happen  - check Moreno/ renin ratio    Mild SILVIO   - resolved w/ hydration     Multiple other medical issues      Subjective:   HPI: Jermaine Beard is a 76 y.o.  male who has been admitted to the hospital for syncope and we were asked to see him due to severe hypomagensemia. He has been on low dose lasix and dr Berta Simms and Dr Nik Rg have been following him. His oral Kcl dose was recently increased due to hypokalemia but his Mg levels were never checked before   He had several watery stools yesterday PTA   On admission found to have mild SILVIO, Hypo Mg and Hypo K     Past Medical Hx:   Past Medical History:   Diagnosis Date    CAD (coronary artery disease)     [facility]     DM type 2 (diabetes mellitus, type 2) ([facility]     Gout     HTN (hypertension)     Hypercholesterolemia         Past Surgical Hx:     Past Surgical History:   Procedure Laterality Date    HX CORONARY ARTERY BYPASS GRAFT           No Known Allergies    Social Hx:  reports that he has been smoking. He has a 20.00 pack-year smoking history.  he has never used smokeless tobacco. He reports that he does not drink alcohol or use drugs. Family History   Problem Relation Age of Onset    Heart Disease Father     Heart Attack Father     Hypertension Brother        Review of Systems:  A twelve point review of system was performed today. Pertinent positives and negatives are mentioned in the HPI. The reminder of the ROS is negative and noncontributory. Objective:    Vitals:    Vitals:    12/22/18 0018 12/22/18 0347 12/22/18 0830 12/22/18 1217   BP: (!) 145/100 121/79 129/82 136/83   Pulse:  67 60 68   Resp:  18 16 18   Temp:  98 °F (36.7 °C) 97.7 °F (36.5 °C) 98 °F (36.7 °C)   SpO2:       Weight:  95.3 kg (210 lb 1.6 oz)     Height:         I&O's:  12/21 0701 - 12/22 0700  In: 135 [P.O.:125; I.V.:10]  Out: 400 [Urine:400]  Visit Vitals  /83 (BP 1 Location: Left arm, BP Patient Position: At rest)   Pulse 68   Temp 98 °F (36.7 °C)   Resp 18   Ht 5' 9\" (1.753 m)   Wt 95.3 kg (210 lb 1.6 oz)   SpO2 96%   BMI 31.03 kg/m²       Physical Exam:  General:Alert, No distress,   HEENT: Eyes are PERRL. Conjunctiva without pallor ,erythema. The sclerae without icterus  Neck:Supple,no mass palpable  Lungs : Clears to auscultation Bilaterally, Normal respiratory effort  CVS: RRR, S1 S2 normal, No rub, no LE edema  Abdomen: Soft, Non tender, No hepatosplenomegaly, bowel sounds present  Extremities: No cyanosis, No clubbing  Skin: No rash or lesions.   Lymph nodes: No palpable nodes  MS: No joint swelling, erythema, warmth  Neurologic: non focal, AAO x 3  Psych: normal affect    Laboratory Results:    Recent Labs     12/22/18  0334 12/21/18 2057 12/21/18  1845 12/21/18  1148     --   --  139   K 3.0*  --   --  3.5     --   --  105   CO2 27  --   --  25   *  --   --  283*   BUN 11  --   --  10   CREA 1.01  --   --  1.12   CA 8.3*  --   --  8.5   MG 2.0 1.4* 0.3*  --    ALB  --   --   --  3.3*   SGOT  --   --   --  19   ALT  --   --   --  33   INR  --   --   --  1.2*     Recent Labs     12/22/18  0334 12/21/18  1148   WBC 5.0 5.6   HGB 14.0 15.6   HCT 41.5 46.8   * 141*     No results found for: SDES  No results found for: CULT  Recent Results (from the past 24 hour(s))   LACTIC ACID    Collection Time: 12/21/18  6:45 PM   Result Value Ref Range    Lactic acid 4.0 (HH) 0.4 - 2.0 MMOL/L   HEMOGLOBIN A1C WITH EAG    Collection Time: 12/21/18  6:45 PM   Result Value Ref Range    Hemoglobin A1c 9.0 (H) 4.2 - 6.3 %    Est. average glucose 212 mg/dL   TSH 3RD GENERATION    Collection Time: 12/21/18  6:45 PM   Result Value Ref Range    TSH 0.61 0.36 - 3.74 uIU/mL   MAGNESIUM    Collection Time: 12/21/18  6:45 PM   Result Value Ref Range    Magnesium 0.3 (LL) 1.6 - 2.4 mg/dL   TROPONIN I    Collection Time: 12/21/18  6:45 PM   Result Value Ref Range    Troponin-I, Qt. <0.05 <0.05 ng/mL   CK    Collection Time: 12/21/18  6:45 PM   Result Value Ref Range     39 - 308 U/L   LACTIC ACID    Collection Time: 12/21/18  8:57 PM   Result Value Ref Range    Lactic acid 3.8 (HH) 0.4 - 2.0 MMOL/L   MAGNESIUM    Collection Time: 12/21/18  8:57 PM   Result Value Ref Range    Magnesium 1.4 (L) 1.6 - 2.4 mg/dL   POC G3 - PUL    Collection Time: 12/21/18 10:18 PM   Result Value Ref Range    pH (POC) 7.426 7.35 - 7.45      pCO2 (POC) 39.8 35.0 - 45.0 MMHG    pO2 (POC) 60 (L) 80 - 100 MMHG    HCO3 (POC) 26.2 (H) 22 - 26 MMOL/L    sO2 (POC) 91 (L) 92 - 97 %    Base excess (POC) 2 mmol/L    Site LEFT RADIAL      Device: ROOM AIR      Allens test (POC) YES      Specimen type (POC) ARTERIAL      Total resp.  rate 18     GLUCOSE, POC    Collection Time: 12/21/18 10:23 PM   Result Value Ref Range    Glucose (POC) 239 (H) 65 - 100 mg/dL    Performed by Reva \[facility]\""sita    TROPONIN I    Collection Time: 12/21/18 11:46 PM   Result Value Ref Range    Troponin-I, Qt. <0.05 <0.05 ng/mL   CK    Collection Time: 12/21/18 11:46 PM   Result Value Ref Range     (H) 39 - 308 U/L   URINALYSIS W/MICROSCOPIC    Collection Time: 12/21/18 11:49 PM   Result Value Ref Range    Color YELLOW/STRAW      Appearance CLEAR CLEAR      Specific gravity 1.017 1.003 - 1.030      pH (UA) 6.0 5.0 - 8.0      Protein NEGATIVE  NEG mg/dL    Glucose 250 (A) NEG mg/dL    Ketone NEGATIVE  NEG mg/dL    Bilirubin NEGATIVE  NEG      Blood NEGATIVE  NEG      Urobilinogen 1.0 0.2 - 1.0 EU/dL    Nitrites NEGATIVE  NEG      Leukocyte Esterase NEGATIVE  NEG      WBC 0-4 0 - 4 /hpf    RBC 0-5 0 - 5 /hpf    Epithelial cells FEW FEW /lpf    Bacteria NEGATIVE  NEG /hpf    Hyaline cast 0-2 0 - 5 /lpf   LACTIC ACID    Collection Time: 12/22/18  3:34 AM   Result Value Ref Range    Lactic acid 1.9 0.4 - 2.0 MMOL/L   METABOLIC PANEL, BASIC    Collection Time: 12/22/18  3:34 AM   Result Value Ref Range    Sodium 139 136 - 145 mmol/L    Potassium 3.0 (L) 3.5 - 5.1 mmol/L    Chloride 106 97 - 108 mmol/L    CO2 27 21 - 32 mmol/L    Anion gap 6 5 - 15 mmol/L    Glucose 190 (H) 65 - 100 mg/dL    BUN 11 6 - 20 MG/DL    Creatinine 1.01 0.70 - 1.30 MG/DL    BUN/Creatinine ratio 11 (L) 12 - 20      GFR est AA >60 >60 ml/min/1.73m2    GFR est non-AA >60 >60 ml/min/1.73m2    Calcium 8.3 (L) 8.5 - 10.1 MG/DL   CBC WITH AUTOMATED DIFF    Collection Time: 12/22/18  3:34 AM   Result Value Ref Range    WBC 5.0 4.1 - 11.1 K/uL    RBC 4.96 4.10 - 5.70 M/uL    HGB 14.0 12.1 - 17.0 g/dL    HCT 41.5 36.6 - 50.3 %    MCV 83.7 80.0 - 99.0 FL    MCH 28.2 26.0 - 34.0 PG    MCHC 33.7 30.0 - 36.5 g/dL    RDW 13.0 11.5 - 14.5 %    PLATELET 811 (L) 188 - 400 K/uL    MPV 11.1 8.9 - 12.9 FL    NRBC 0.0 0  WBC    ABSOLUTE NRBC 0.00 0.00 - 0.01 K/uL    NEUTROPHILS 50 32 - 75 %    LYMPHOCYTES 37 12 - 49 %    MONOCYTES 10 5 - 13 %    EOSINOPHILS 2 0 - 7 %    BASOPHILS 0 0 - 1 %    IMMATURE GRANULOCYTES 0 0.0 - 0.5 %    ABS. NEUTROPHILS 2.5 1.8 - 8.0 K/UL    ABS. LYMPHOCYTES 1.9 0.8 - 3.5 K/UL    ABS. MONOCYTES 0.5 0.0 - 1.0 K/UL    ABS. EOSINOPHILS 0.1 0.0 - 0.4 K/UL    ABS. BASOPHILS 0.0 0.0 - 0.1 K/UL    ABS. IMM. GRANS. 0.0 0.00 - 0.04 K/UL    DF AUTOMATED     MAGNESIUM    Collection Time: 12/22/18  3:34 AM   Result Value Ref Range    Magnesium 2.0 1.6 - 2.4 mg/dL   GLUCOSE, POC    Collection Time: 12/22/18  6:54 AM   Result Value Ref Range    Glucose (POC) 203 (H) 65 - 100 mg/dL    Performed by [address] [place] [address] St, POC    Collection Time: 12/22/18 11:22 AM   Result Value Ref Range    Glucose (POC) 233 (H) 65 - 100 mg/dL    Performed by [employer]            Urine dipstick:   Lab Results   Component Value Date/Time    Color YELLOW/STRAW 12/21/2018 11:49 PM    Appearance CLEAR 12/21/2018 11:49 PM    Specific gravity 1.017 12/21/2018 11:49 PM    pH (UA) 6.0 12/21/2018 11:49 PM    Protein NEGATIVE  12/21/2018 11:49 PM    Glucose 250 (A) 12/21/2018 11:49 PM    Ketone NEGATIVE  12/21/2018 11:49 PM    Bilirubin NEGATIVE  12/21/2018 11:49 PM    Urobilinogen 1.0 12/21/2018 11:49 PM    Nitrites NEGATIVE  12/21/2018 11:49 PM    Leukocyte Esterase NEGATIVE  12/21/2018 11:49 PM    Epithelial cells FEW 12/21/2018 11:49 PM    Bacteria NEGATIVE  12/21/2018 11:49 PM    WBC 0-4 12/21/2018 11:49 PM    RBC 0-5 12/21/2018 11:49 PM       I have reviewed the following: All pertinent labs, microbiology data, radiology imaging for my assessment      Medications list Personally Reviewed   [x]      Yes     []               No       Medications:  Prior to Admission medications    Medication Sig Start Date End Date Taking? Authorizing Provider   JOAQUINA 50-1,000 mg per tablet Take 1 Tab by mouth two (2) times daily (with meals). 3/1/18  Yes Provider, Historical   irbesartan (AVAPRO) 300 mg tablet Take 300 mg by mouth nightly. Yes Provider, Historical   multivitamin (ONE A DAY) tablet Take 1 Tab by mouth daily. Yes Provider, Historical   atorvastatin (LIPITOR) 10 mg tablet Take 1 Tab by mouth daily. 2/16/18  Yes David Wyman MD   aspirin delayed-release 81 mg tablet Take 81 mg by mouth daily.    Yes Provider, Historical carvedilol (COREG) 25 mg tablet Take 25 mg by mouth two (2) times daily (with meals). Yes Provider, Historical   potassium chloride (KLOR-CON M10) 10 mEq tablet Take 10 mEq by mouth two (2) times a day. Yes Provider, Historical   furosemide (LASIX) 20 mg tablet Take 20 mg by mouth daily. Yes Provider, Historical   amLODIPine (NORVASC) 5 mg tablet Take 5 mg by mouth daily. Yes Provider, Historical        Thank you for allowing us to participate in the care of this patient. We will follow patient. Please dont hesitate to call with any questions    Janie Garcia MD  [facility]   [address] [facility] [address] [place] [address]  Phone - [phone]   Fax - [phone]  www. [email or web address] [employer]

## 2018-12-23 VITALS
OXYGEN SATURATION: 96 % | HEART RATE: 61 BPM | DIASTOLIC BLOOD PRESSURE: 86 MMHG | WEIGHT: 211.8 LBS | BODY MASS INDEX: 31.37 KG/M2 | RESPIRATION RATE: 16 BRPM | HEIGHT: 69 IN | TEMPERATURE: 97.7 F | SYSTOLIC BLOOD PRESSURE: 122 MMHG

## 2018-12-23 LAB
ANION GAP SERPL CALC-SCNC: 7 MMOL/L (ref 5–15)
BUN SERPL-MCNC: 8 MG/DL (ref 6–20)
BUN/CREAT SERPL: 9 (ref 12–20)
CALCIUM SERPL-MCNC: 7.9 MG/DL (ref 8.5–10.1)
CHLORIDE SERPL-SCNC: 110 MMOL/L (ref 97–108)
CO2 SERPL-SCNC: 25 MMOL/L (ref 21–32)
CREAT SERPL-MCNC: 0.88 MG/DL (ref 0.7–1.3)
GLUCOSE BLD STRIP.AUTO-MCNC: 145 MG/DL (ref 65–100)
GLUCOSE BLD STRIP.AUTO-MCNC: 286 MG/DL (ref 65–100)
GLUCOSE BLD STRIP.AUTO-MCNC: 313 MG/DL (ref 65–100)
GLUCOSE BLD STRIP.AUTO-MCNC: 365 MG/DL (ref 65–100)
GLUCOSE SERPL-MCNC: 167 MG/DL (ref 65–100)
MAGNESIUM SERPL-MCNC: 2.1 MG/DL (ref 1.6–2.4)
POTASSIUM SERPL-SCNC: 3.5 MMOL/L (ref 3.5–5.1)
SERVICE CMNT-IMP: ABNORMAL
SODIUM SERPL-SCNC: 142 MMOL/L (ref 136–145)

## 2018-12-23 PROCEDURE — 74011250636 HC RX REV CODE- 250/636: Performed by: FAMILY MEDICINE

## 2018-12-23 PROCEDURE — 80048 BASIC METABOLIC PNL TOTAL CA: CPT

## 2018-12-23 PROCEDURE — 82962 GLUCOSE BLOOD TEST: CPT

## 2018-12-23 PROCEDURE — 74011636637 HC RX REV CODE- 636/637: Performed by: FAMILY MEDICINE

## 2018-12-23 PROCEDURE — 74011250637 HC RX REV CODE- 250/637: Performed by: FAMILY MEDICINE

## 2018-12-23 PROCEDURE — 83735 ASSAY OF MAGNESIUM: CPT

## 2018-12-23 PROCEDURE — 96372 THER/PROPH/DIAG INJ SC/IM: CPT

## 2018-12-23 PROCEDURE — 96367 TX/PROPH/DG ADDL SEQ IV INF: CPT

## 2018-12-23 PROCEDURE — 99218 HC RM OBSERVATION: CPT

## 2018-12-23 PROCEDURE — 36415 COLL VENOUS BLD VENIPUNCTURE: CPT

## 2018-12-23 PROCEDURE — 74011250637 HC RX REV CODE- 250/637: Performed by: HOSPITALIST

## 2018-12-23 RX ORDER — CALCIUM CARBONATE 300MG(750)
1 TABLET,CHEWABLE ORAL 2 TIMES DAILY
Qty: 14 TAB | Refills: 0 | Status: SHIPPED | OUTPATIENT
Start: 2018-12-23 | End: 2019-02-08

## 2018-12-23 RX ORDER — LANOLIN ALCOHOL/MO/W.PET/CERES
400 CREAM (GRAM) TOPICAL 2 TIMES DAILY
Qty: 14 TAB | Refills: 0 | Status: SHIPPED | OUTPATIENT
Start: 2018-12-23 | End: 2019-02-08

## 2018-12-23 RX ADMIN — HEPARIN SODIUM 5000 UNITS: 5000 INJECTION INTRAVENOUS; SUBCUTANEOUS at 13:42

## 2018-12-23 RX ADMIN — Medication 400 MG: at 08:49

## 2018-12-23 RX ADMIN — AMLODIPINE BESYLATE 5 MG: 5 TABLET ORAL at 08:49

## 2018-12-23 RX ADMIN — ASPIRIN 81 MG: 81 TABLET, COATED ORAL at 08:49

## 2018-12-23 RX ADMIN — INSULIN LISPRO 5 UNITS: 100 INJECTION, SOLUTION INTRAVENOUS; SUBCUTANEOUS at 13:42

## 2018-12-23 RX ADMIN — LOSARTAN POTASSIUM 100 MG: 50 TABLET ORAL at 08:49

## 2018-12-23 RX ADMIN — CARVEDILOL 25 MG: 12.5 TABLET, FILM COATED ORAL at 08:49

## 2018-12-23 RX ADMIN — HEPARIN SODIUM 5000 UNITS: 5000 INJECTION INTRAVENOUS; SUBCUTANEOUS at 04:39

## 2018-12-23 NOTE — PROGRESS NOTES
Problem: Falls - Risk of  Goal: *Absence of Falls  Document Trevor Fall Risk and appropriate interventions in the flowsheet.   Outcome: Progressing Towards Goal  Fall Risk Interventions:            Medication Interventions: Teach patient to arise slowly, Patient to call before getting OOB         History of Falls Interventions: Door open when patient unattended

## 2018-12-23 NOTE — PROGRESS NOTES
[facility]   [address] [facility], [facility] [address], [facility]  Phone: [phone]   [phone]       Nephrology Progress Note  Megha Quesada     1950     [id number]  Date of Admission : 12/21/2018 12/23/18    CC: Follow up for SILVIO       Assessment and Plan   Severe Hypomagnesemia :  - suspect developed chronic Hypomagnesemia since starting loop diuretics   - Exacerbated by diarrheal illness   - exclude underlying Gitelmans syndrome   - improved w/ replacement   - continue oral Mg on d.c      Hypokalemia :  - chronic loops + diarrheal illness  - he has been on max dose of Losartan and this should nt happen  - follow up Moreno/ renin ratio -- he will f/u with me  After d/c      Mild SILVIO   - resolved w/ hydration      Interval History:  Seen and examined   Feels better   DENIES any N/V/CP/SOB    Review of Systems: Pertinent items are noted in HPI.     Current Medications:   Current Facility-Administered Medications   Medication Dose Route Frequency    magnesium oxide (MAG-OX) tablet 400 mg  400 mg Oral BID    amLODIPine (NORVASC) tablet 5 mg  5 mg Oral DAILY    aspirin delayed-release tablet 81 mg  81 mg Oral DAILY    carvedilol (COREG) tablet 25 mg  25 mg Oral BID WITH MEALS    losartan (COZAAR) tablet 100 mg  100 mg Oral DAILY    sodium chloride (NS) flush 5-10 mL  5-10 mL IntraVENous Q8H    sodium chloride (NS) flush 5-10 mL  5-10 mL IntraVENous PRN    glucose chewable tablet 16 g  4 Tab Oral PRN    dextrose (D50W) injection syrg 12.5-25 g  25-50 mL IntraVENous PRN    glucagon (GLUCAGEN) injection 1 mg  1 mg IntraMUSCular PRN    0.9% sodium chloride infusion  75 mL/hr IntraVENous CONTINUOUS    acetaminophen (TYLENOL) tablet 650 mg  650 mg Oral Q4H PRN    heparin (porcine) injection 5,000 Units  5,000 Units SubCUTAneous Q8H    insulin lispro (HUMALOG) injection   SubCUTAneous AC&HS      No Known Allergies    Objective:  Vitals:    Vitals:    12/23/18 0400 12/23/18 0600 12/23/18 0842 12/23/18 1213   BP: 128/84  130/84 122/86   Pulse: 65  (!) 59 61   Resp: 18  16 16   Temp: 98 °F (36.7 °C)  98 °F (36.7 °C) 97.7 °F (36.5 °C)   SpO2: 96%  98% 96%   Weight:  96.1 kg (211 lb 12.8 oz)     Height:         Intake and Output:  12/23 0701 - 12/23 1900  In: 240 [P.O.:240]  Out: -   12/21 1901 - 12/23 0700  In: 880 [P.O.:880]  Out: 400 [Urine:400]    Physical Examination:    General: NAD,Conversant   Neck:  Supple, no mass  Resp:  Lungs CTA B/L, no wheezing , normal respiratory effort  CV:  RRR,  no murmur or rub, no LE edema  GI:  Soft, NT, + Bowel sounds, no hepatosplenomegaly  Neurologic:  Non focal  Psych:             AAO x 3 appropriate affect   Skin:  No Rash  :  No pisano     []    High complexity decision making was performed  []    Patient is at high-risk of decompensation with multiple organ involvement    Lab Data Personally Reviewed: I have reviewed all the pertinent labs, microbiology data and radiology studies during assessment.     Recent Labs     12/23/18  0452 12/22/18 0334 12/21/18  2057 12/21/18  1845 12/21/18  1148    139  --   --  139   K 3.5 3.0*  --   --  3.5   * 106  --   --  105   CO2 25 27  --   --  25   * 190*  --   --  283*   BUN 8 11  --   --  10   CREA 0.88 1.01  --   --  1.12   CA 7.9* 8.3*  --   --  8.5   MG 2.1 2.0 1.4* 0.3*  --    ALB  --   --   --   --  3.3*   SGOT  --   --   --   --  19   ALT  --   --   --   --  33   INR  --   --   --   --  1.2*     Recent Labs     12/22/18  0334 12/21/18  1148   WBC 5.0 5.6   HGB 14.0 15.6   HCT 41.5 46.8   * 141*     No results found for: SDES  No results found for: CULT  Recent Results (from the past 24 hour(s))   GLUCOSE, POC    Collection Time: 12/22/18  4:52 PM   Result Value Ref Range    Glucose (POC) 203 (H) 65 - 100 mg/dL    Performed by [address] [place] [address], POC    Collection Time: 12/22/18 11:41 PM   Result Value Ref Range    Glucose (POC) 263 (H) 65 - 100 mg/dL    Performed by Tiffany Cool    METABOLIC PANEL, BASIC    Collection Time: 12/23/18  4:52 AM   Result Value Ref Range    Sodium 142 136 - 145 mmol/L    Potassium 3.5 3.5 - 5.1 mmol/L    Chloride 110 (H) 97 - 108 mmol/L    CO2 25 21 - 32 mmol/L    Anion gap 7 5 - 15 mmol/L    Glucose 167 (H) 65 - 100 mg/dL    BUN 8 6 - 20 MG/DL    Creatinine 0.88 0.70 - 1.30 MG/DL    BUN/Creatinine ratio 9 (L) 12 - 20      GFR est AA >60 >60 ml/min/1.73m2    GFR est non-AA >60 >60 ml/min/1.73m2    Calcium 7.9 (L) 8.5 - 10.1 MG/DL   MAGNESIUM    Collection Time: 12/23/18  4:52 AM   Result Value Ref Range    Magnesium 2.1 1.6 - 2.4 mg/dL   GLUCOSE, POC    Collection Time: 12/23/18  6:43 AM   Result Value Ref Range    Glucose (POC) 145 (H) 65 - 100 mg/dL    Performed by [address] [place] [address], POC    Collection Time: 12/23/18 10:55 AM   Result Value Ref Range    Glucose (POC) 365 (H) 65 - 100 mg/dL    Performed by Sonya Simpson            Total time spent with patient:  xxx   min. Care Plan discussed with:  Patient     Family      RN      Consulting Physician [facility],         I have reviewed the flowsheets. Chart and Pertinent Notes have been reviewed. No change in PMH ,family and social history from Consult note.       Jeannette Pak MD

## 2018-12-23 NOTE — DISCHARGE INSTRUCTIONS
Discharge Instructions       PATIENT ID: Collin Saba  MRN: [id number]   YOB: 1950    DATE OF ADMISSION: 12/21/2018 11:36 AM    DATE OF DISCHARGE: 12/23/2018    PRIMARY CARE PROVIDER: Collette Gu, MD     ATTENDING PHYSICIAN: Kimberlee Morales MD  DISCHARGING PROVIDER: Elayne Wagner MD    To contact this individual call [phone] and ask the  to page. If unavailable ask to be transferred the Adult Hospitalist Department. DISCHARGE DIAGNOSES volume depletion  Principal Problem:    Syncope (12/21/2018)    CONSULTATIONS: IP CONSULT TO NEPHROLOGY    PROCEDURES/SURGERIES: * No surgery found *    PENDING TEST RESULTS:   At the time of discharge the following test results are still pending: Aldosteron    FOLLOW UP APPOINTMENTS:   Follow-up Information    None          ADDITIONAL CARE RECOMMENDATIONS: please continue medication as prescribed and follow up with your primary doctor one week after discharge    DIET: Cardiac Diet      ACTIVITY: Activity as tolerated    WOUND CARE: NONE    EQUIPMENT needed: NO Need. DISCHARGE MEDICATIONS:   See Medication Reconciliation Form    · It is important that you take the medication exactly as they are prescribed. · Keep your medication in the bottles provided by the pharmacist and keep a list of the medication names, dosages, and times to be taken in your wallet. · Do not take other medications without consulting your doctor. NOTIFY YOUR PHYSICIAN FOR ANY OF THE FOLLOWING:   Fever over 101 degrees for 24 hours. Chest pain, shortness of breath, fever, chills, nausea, vomiting, diarrhea, change in mentation, falling, weakness, bleeding. Severe pain or pain not relieved by medications. Or, any other signs or symptoms that you may have questions about. DISPOSITION:    Home With:   OT  PT  HH  RN       SNF/Inpatient Rehab/LTAC    Independent/assisted living    Hospice    Other:     CDMP Checked: Yes X     PROBLEM LIST Updated:   Yes X Signed:   Lizzette Davis MD  12/23/2018  2:17 PM    DISCHARGE SUMMARY from Nurse    PATIENT INSTRUCTIONS:    After general anesthesia or intravenous sedation, for 24 hours or while taking prescription Narcotics:  · Limit your activities  · Do not drive and operate hazardous machinery  · Do not make important personal or business decisions  · Do  not drink alcoholic beverages  · If you have not urinated within 8 hours after discharge, please contact your surgeon on call. Report the following to your surgeon:  · Excessive pain, swelling, redness or odor of or around the surgical area  · Temperature over 100.5  · Nausea and vomiting lasting longer than 4 hours or if unable to take medications  · Any signs of decreased circulation or nerve impairment to extremity: change in color, persistent  numbness, tingling, coldness or increase pain  · Any questions    What to do at Home:  *  Please give a list of your current medications to your Primary Care Provider. *  Please update this list whenever your medications are discontinued, doses are      changed, or new medications (including over-the-counter products) are added. *  Please carry medication information at all times in case of emergency situations. These are general instructions for a healthy lifestyle:    No smoking/ No tobacco products/ Avoid exposure to second hand smoke  Surgeon General's Warning:  Quitting smoking now greatly reduces serious risk to your health. Obesity, smoking, and sedentary lifestyle greatly increases your risk for illness    A healthy diet, regular physical exercise & weight monitoring are important for maintaining a healthy lifestyle    You may be retaining fluid if you have a history of heart failure or if you experience any of the following symptoms:  Weight gain of 3 pounds or more overnight or 5 pounds in a week, increased swelling in our hands or feet or shortness of breath while lying flat in bed.   Please call your doctor as soon as you notice any of these symptoms; do not wait until your next office visit. Recognize signs and symptoms of STROKE:    F-face looks uneven    A-arms unable to move or move unevenly    S-speech slurred or non-existent    T-time-call 911 as soon as signs and symptoms begin-DO NOT go       Back to bed or wait to see if you get better-TIME IS BRAIN. Warning Signs of HEART ATTACK     Call 911 if you have these symptoms:   Chest discomfort. Most heart attacks involve discomfort in the center of the chest that lasts more than a few minutes, or that goes away and comes back. It can feel like uncomfortable pressure, squeezing, fullness, or pain.  Discomfort in other areas of the upper body. Symptoms can include pain or discomfort in one or both arms, the back, neck, jaw, or stomach.  Shortness of breath with or without chest discomfort.  Other signs may include breaking out in a cold sweat, nausea, or lightheadedness. Don't wait more than five minutes to call 911 - MINUTES MATTER! Fast action can save your life. Calling 911 is almost always the fastest way to get lifesaving treatment. Emergency Medical Services staff can begin treatment when they arrive -- up to an hour sooner than if someone gets to the hospital by car. The discharge information has been reviewed with the patient and spouse. The patient and spouse verbalized understanding. Discharge medications reviewed with the patient and spouse and appropriate educational materials and side effects teaching were provided.

## 2018-12-23 NOTE — PROGRESS NOTES
Pt A&Ox4. NSR on tele, with occasional PVCs. BP stable. Denies any pain or dizziness. OOB ad hemanth. Will continue to monitor for any changes.

## 2018-12-23 NOTE — DISCHARGE SUMMARY
Discharge Summary       PATIENT ID: Kittie Fothergill  MRN: [id number]   YOB: 1950    DATE OF ADMISSION: 12/21/2018 11:36 AM    DATE OF DISCHARGE: 12/23/18  PRIMARY CARE PROVIDER: Denise Enamorado MD     ATTENDING PHYSICIAN: Kylie Hong MD  DISCHARGING PROVIDER: Kylie Hong MD    To contact this individual call [phone] and ask the  to page. If unavailable ask to be transferred the Adult Hospitalist Department. CONSULTATIONS: IP CONSULT TO NEPHROLOGY    PROCEDURES/SURGERIES: * No surgery found *    ADMITTING DIAGNOSES & HOSPITAL COURSE:   1.  Syncope, unclear etiology.    - may be related to chronic watery stool with severe depletion of mag  - Gentle IV hydration and he is not orthostatic  -  If symptoms persist, will consider further intervention and diagnostics including Cardiology consult.  I put the patient on fall precautions, further intervention per hospital course.    - PT/OT initial head CT and abd CT negative for acute issues     2.  History of chronic systolic congestive heart failure.  NYHA 1   - Currently, patient appears to be volume contracted.  Will proved gentle IV hydration   -  strict I's and O's and continue to closely monitor.    - Further intervention per hospital course.  Continue home medication. - on BB and ACE     3.  Diarrhea.    - CT of the abdomen and pelvis WNL  -  Continue to closely monitor.  If symptoms persist, may be viral etiology WBC count normal no fever     4.  Diabetes, poorly controlled.    The patient will be on sliding scale NovoLog insulin, Accu-Cheks, diet control and close monitoring, will hold metformin.  - A1C 9     5.  Lactic acidosis.  No obvious signs of acute infection.    -The patient has normal  white count.  Does not appear to be febrile.  Does not appear to be infected, thus will hold antibiotics for now.  Chest x-ray does not show any acute pathology.   - resolved with IVF from volume contraction     6.  Hypertension.  Continue home medications      7. Hypokalemia- replete     8. Severe hypomagnesemia due to diarrhea - replete           DISCHARGE DIAGNOSES / PLAN:      1. Syncope: due to dehydration abnormal electrolyte, advise take 1/2 dose of lasix if has diarrhea, advised continue KCL And lasix as previous dose, no dizziness and no orthostatic. 2. Systolic CHF; continue meds as prescribed see cardiologist, patient euvolemic on discharge  3. Diarrhea, negative cultures and resolved. 4. DM; hyperglycemia: given  Instruction, no carb on day of discharge. PENDING TEST RESULTS:   At the time of discharge the following test results are still pending: Aldosteron    FOLLOW UP APPOINTMENTS:    Follow-up Information    None          ADDITIONAL CARE RECOMMENDATIONS: see PCP next week for lab work, check glucose twice on day of discharge. DIET: Cardiac Diet and Diabetic Diet  Oral Nutritional Supplements NO     ACTIVITY: Activity as tolerated    WOUND CARE: NONE    EQUIPMENT needed: BLOOD SUGAR MONITOR      DISCHARGE MEDICATIONS:  Current Discharge Medication List      CONTINUE these medications which have NOT CHANGED    Details   JANUMET 50-1,000 mg per tablet Take 1 Tab by mouth two (2) times daily (with meals). irbesartan (AVAPRO) 300 mg tablet Take 300 mg by mouth nightly. multivitamin (ONE A DAY) tablet Take 1 Tab by mouth daily. atorvastatin (LIPITOR) 10 mg tablet Take 1 Tab by mouth daily. Qty: 90 Tab, Refills: 3      aspirin delayed-release 81 mg tablet Take 81 mg by mouth daily. carvedilol (COREG) 25 mg tablet Take 25 mg by mouth two (2) times daily (with meals). potassium chloride (KLOR-CON M10) 10 mEq tablet Take 10 mEq by mouth two (2) times a day. amLODIPine (NORVASC) 5 mg tablet Take 5 mg by mouth daily. NOTIFY YOUR PHYSICIAN FOR ANY OF THE FOLLOWING:   Fever over 101 degrees for 24 hours.    Chest pain, shortness of breath, fever, chills, nausea, vomiting, diarrhea, change in mentation, falling, weakness, bleeding. Severe pain or pain not relieved by medications. Or, any other signs or symptoms that you may have questions about. DISPOSITION:    Home With:   OT  PT  HH  RN       Long term SNF/Inpatient Rehab   x Independent/assisted living    Hospice    Other:       PATIENT CONDITION AT DISCHARGE:     Functional status    Poor     Deconditioned    x Independent      Cognition    x Lucid     Forgetful     Dementia      Catheters/lines (plus indication)    Schmidt     PICC     PEG    x None      Code status     Full code     DNR      PHYSICAL EXAMINATION AT DISCHARGE:                                                   Constitutional:  No acute distress, cooperative,    ENT:  Oral mucous moist,   Resp:  CTA bilaterally. CV:  Regular rhythm, normal rate,     GI:  Soft, non distended, non tender. bs+    Musculoskeletal:  No edema, warm, 2+ pulses throughout    Neurologic:  Moves all extremities. AAOx3, CN II-XII reviewed                         Psych:  Good insight, Not anxious nor agitated            CHRONIC MEDICAL DIAGNOSES:  Problem List as of 12/23/2018 Date Reviewed: 12/21/2018          Codes Class Noted - Resolved    * (Principal) Syncope ICD-10-CM: R55  ICD-9-CM: 780.2  12/21/2018 - Present        ASHD (arteriosclerotic heart disease) ICD-10-CM: I25.10  ICD-9-CM: 414.00  9/17/2013 - Present    Overview Signed 9/17/2013  2:48 PM by Zacarias Daily MD     CABG 2005, LIMA to LAD, Y graft to LAD-OM1.              Systolic CHF, chronic (HCC) ICD-10-CM: I50.22  ICD-9-CM: 428.22, 428.0  9/17/2013 - Present        Essential hypertension, benign ICD-10-CM: I10  ICD-9-CM: 401.1  9/17/2013 - Present        Mixed hyperlipidemia ICD-10-CM: E78.2  ICD-9-CM: 272.2  9/17/2013 - Present              Greater than 45minutes were spent with the patient on counseling and coordination of care    Signed:   Sherif Mccarty MD  12/23/2018  2:20 PM

## 2018-12-23 NOTE — PROGRESS NOTES
Occupational Therapy Note 1411    Orders acknowledged, chart reviewed. Spoke with RN who reports patient has been up ad hemanth with no AD, stable vital signs. Spoke with patient who reports no difficulties with ADLs & functional mobility, plans for d/c later today. No indication for skilled OT evaluation as patient is at his baseline. Will complete orders, please re-consult only if a change in functional status occurs.      Thank you  Hien Vizcaino, OT

## 2018-12-23 NOTE — PROGRESS NOTES
2001 [facility] 1 paged for blood glucose of 365  1255 Anuradha paged.  Pt is also questioning labs mentioned by a hospitalist this am. No orders as of yet  1320 [facility]OR[facility] Lispro 5 units for blood glucose 365 and orthostatic blood pressure  1335 Laying /89 62            Sitting /75 76    Standing /95 68  1405 Blood glucose 313   @[id number]=075

## 2018-12-29 LAB
ALDOST SERPL-MCNC: 3.9 NG/DL (ref 0–30)
ALDOST SERPL-MCNC: 4.3 NG/DL (ref 0–30)
RENIN PLAS-CCNC: 0.29 NG/ML/HR (ref 0.17–5.38)
SPECIMEN SOURCE: NORMAL

## 2019-02-06 ENCOUNTER — OFFICE VISIT (OUTPATIENT)
Dept: CARDIOLOGY CLINIC | Age: 69
End: 2019-02-06

## 2019-02-06 ENCOUNTER — DOCUMENTATION ONLY (OUTPATIENT)
Dept: CARDIOLOGY CLINIC | Age: 69
End: 2019-02-06

## 2019-02-06 VITALS
BODY MASS INDEX: 32.11 KG/M2 | HEIGHT: 69 IN | WEIGHT: 216.8 LBS | SYSTOLIC BLOOD PRESSURE: 120 MMHG | OXYGEN SATURATION: 97 % | RESPIRATION RATE: 16 BRPM | DIASTOLIC BLOOD PRESSURE: 88 MMHG | HEART RATE: 73 BPM

## 2019-02-06 DIAGNOSIS — E78.2 MIXED HYPERLIPIDEMIA: ICD-10-CM

## 2019-02-06 DIAGNOSIS — I10 ESSENTIAL HYPERTENSION, BENIGN: ICD-10-CM

## 2019-02-06 DIAGNOSIS — I25.10 ASHD (ARTERIOSCLEROTIC HEART DISEASE): Primary | ICD-10-CM

## 2019-02-06 DIAGNOSIS — I50.22 SYSTOLIC CHF, CHRONIC (HCC): ICD-10-CM

## 2019-02-06 RX ORDER — BLOOD-GLUCOSE METER
EACH MISCELLANEOUS
Refills: 0 | COMMUNITY
Start: 2019-01-04

## 2019-02-06 NOTE — PROGRESS NOTES
1. Have you been to the ER, urgent care clinic since your last visit? Hospitalized since your last visit? Seen on 12/21/18 for syncope due to dehydration. 2. Have you seen or consulted any other health care providers outside of the [address] [place] [address] since your last visit? Include any pap smears or colon screening.    No.      Chief Complaint   Patient presents with    Follow-up     6 month- pt denies any cardiac symptoms

## 2019-02-06 NOTE — PATIENT INSTRUCTIONS
1.  Stop Avapro 300 mg, this is the bottle you put in your pocket. I have sent to [facility] your new medication Entresto which you will take 1 tablet twice a day, continue your fluid pill and carvedilol. 2.  The next step is to check if there is any blockages in your blood vessels or bypass grafts this will be done via cardiac catheterization with Dr. Richar Clark this has been scheduled today. 3.  We are also scheduling you an appointment with Dr. Rah Kirkland who is a cardiac electrophysiologist who would take care of putting in the pacemaker/defibrillator we discussed that would occur most likely the end of February or beginning of March. 4.  We will see you after the cardiac catheterization in the office and will consider increasing the dose of Entresto. To recall the reason for doing this is when you were in [facility] the pumping strength of your heart had dropped to 15-20% previously when we saw you in March it was 35-40%.

## 2019-02-06 NOTE — H&P (VIEW-ONLY)
Nicolas Reynoso DNP, ANP-BC Subjective/HPI:  
 
Kendal Hurst is a 76 y.o. male is here for routine f/u. To recall Mr. Autumn Lopez is a 80-year-old male with a history of CABG x5 in , systolic heart failure with previous echo 2018 showing ejection fraction 35-40% with no reversible ischemia on nuclear stress test at the time as well. He has maintained carvedilol and Avapro. He was admitted in 2018 to [facility] for syncopal episode of unknown cause. A echocardiogram was performed during this admission now showing his ejection fraction had dropped to 15-20% cardiology was not consulted at the time. He has since maintained furosemide 20 mg, carvedilol and Avapro. He denies chest pain or limiting dyspnea on exertion. He has not had any recurrent dizziness or syncopal episodes since admission. PCP Provider Samantha Fermin MD 
Past Medical History:  
Diagnosis Date  CAD (coronary artery disease)  Chronic systolic HF (heart failure) ([facility]  DM type 2 (diabetes mellitus, type 2) [facility]  Gout   
 HTN (hypertension)  Hypercholesterolemia Past Surgical History:  
Procedure Laterality Date  HX CORONARY ARTERY BYPASS GRAFT   No Known Allergies Family History Problem Relation Age of Onset  Heart Disease Father  Heart Attack Father  Hypertension Brother Current Outpatient Medications Medication Sig  ACCU-CHEK DAVE PLUS METER [facility] USE TO TEST BLOOD SUGAR  JANUMET 50-1,000 mg per tablet Take 1 Tab by mouth two (2) times daily (with meals).  irbesartan (AVAPRO) 300 mg tablet Take 300 mg by mouth nightly.  multivitamin (ONE A DAY) tablet Take 1 Tab by mouth daily.  atorvastatin (LIPITOR) 10 mg tablet Take 1 Tab by mouth daily.  aspirin delayed-release 81 mg tablet Take 81 mg by mouth daily.  carvedilol (COREG) 25 mg tablet Take 25 mg by mouth two (2) times daily (with meals).  potassium chloride (KLOR-CON M10) 10 mEq tablet Take 10 mEq by mouth two (2) times a day.  furosemide (LASIX) 20 mg tablet Take 20 mg by mouth daily.  amLODIPine (NORVASC) 5 mg tablet Take 5 mg by mouth daily.  magnesium oxide (MAG-OX) 400 mg tablet Take 1 Tab by mouth two (2) times a day.  magnesium oxide 400 mg magnesium tab Take 1 Tab by mouth two (2) times a day. No current facility-administered medications for this visit. Vitals:  
 02/06/19 1016 02/06/19 1034 BP: 110/84 120/88 Pulse: 73 Resp: 16 SpO2: 97% Weight: 216 lb 12.8 oz (98.3 kg) Height: 5' 9\" (1.753 m) Social History Socioeconomic History  Marital status:  Spouse name: Not on file  Number of children: Not on file  Years of education: Not on file  Highest education level: Not on file Social Needs  Financial resource strain: Not on file  Food insecurity - worry: Not on file  Food insecurity - inability: Not on file  Transportation needs - medical: Not on file  Transportation needs - non-medical: Not on file Occupational History  Not on file Tobacco Use  Smoking status: Current Every Day Smoker Packs/day: 0.50 Years: 40.00 Pack years: 20.00 Types: Cigarettes  Smokeless tobacco: Never Used  Tobacco comment: 4 cigarettes daily Substance and Sexual Activity  Alcohol use: Yes Comment: occasionally every 2 weeks  Drug use: No  
 Sexual activity: Not on file Other Topics Concern  Not on file Social History Narrative  Not on file I have reviewed the nurses notes, vitals, problem list, allergy list, medical history, family, social history and medications. Review of Symptoms: 
 
General: Pt denies excessive weight gain or loss. Pt is able to conduct ADL's HEENT: Denies blurred vision, headaches, epistaxis and difficulty swallowing. Respiratory: Denies shortness of breath, TO, wheezing or stridor. Cardiovascular: Denies precordial pain, palpitations, edema or PND Gastrointestinal: Denies poor appetite, indigestion, abdominal pain or blood in stool Musculoskeletal: Denies pain or swelling from muscles or joints Neurologic: Denies tremor, paresthesias, or sensory motor disturbance Skin: Denies rash, itching or texture change. Physical Exam:   
 
General: Well developed, in no acute distress, cooperative and alert HEENT: No carotid bruits, no JVD, trach is midline. Neck Supple, PEERL, EOM intact. Heart:  Normal S1/S2 negative S3 or S4. Regular, no murmur, gallop or rub.  
Respiratory: Clear bilaterally x 4, no wheezing or rales Abdomen:   Soft, non-tender, no masses, bowel sounds are active.  
Extremities:  No edema, normal cap refill, no cyanosis, atraumatic. Neuro: A&Ox3, speech clear, gait stable. Skin: Skin color is normal. No rashes or lesions. Non diaphoretic Vascular: 2+ pulses symmetric in all extremities Cardiographics ECG: Sinus rhythm Results for orders placed or performed during the hospital encounter of 12/21/18 EKG, 12 LEAD, INITIAL Result Value Ref Range Ventricular Rate 67 BPM  
 Atrial Rate 67 BPM  
 P-R Interval 172 ms QRS Duration 138 ms Q-T Interval 436 ms  
 QTC Calculation (Bezet) 460 ms Calculated P Axis 113 degrees Calculated R Axis -60 degrees Calculated T Axis 148 degrees Diagnosis Normal sinus rhythm Left axis deviation Left ventricular hypertrophy with QRS widening and repolarization abnormality No previous ECGs available Confirmed by Kaiser Yu MD, Diane Castillo ([id number]) on 12/21/2018 12:44:45 PM 
  
 
 
 
Cardiology Labs: 
Lab Results Component Value Date/Time Cholesterol, total 148 02/20/2018 10:21 AM  
 HDL Cholesterol 30 (L) 02/20/2018 10:21 AM  
 LDL, calculated 64 02/20/2018 10:21 AM  
 Triglyceride 269 (H) 02/20/2018 10:21 AM  
 
 
Lab Results Component Value Date/Time  Sodium 142 12/23/2018 04:52 AM  
 Potassium 3.5 12/23/2018 04:52 AM  
 Chloride 110 (H) 12/23/2018 04:52 AM  
 CO2 25 12/23/2018 04:52 AM  
 Anion gap 7 12/23/2018 04:52 AM  
 Glucose 167 (H) 12/23/2018 04:52 AM  
 BUN 8 12/23/2018 04:52 AM  
 Creatinine 0.88 12/23/2018 04:52 AM  
 BUN/Creatinine ratio 9 (L) 12/23/2018 04:52 AM  
 GFR est AA >60 12/23/2018 04:52 AM  
 GFR est non-AA >60 12/23/2018 04:52 AM  
 Calcium 7.9 (L) 12/23/2018 04:52 AM  
 Bilirubin, total 0.9 12/21/2018 11:48 AM  
 AST (SGOT) 19 12/21/2018 11:48 AM  
 Alk. phosphatase 110 12/21/2018 11:48 AM  
 Protein, total 6.9 12/21/2018 11:48 AM  
 Albumin 3.3 (L) 12/21/2018 11:48 AM  
 Globulin 3.6 12/21/2018 11:48 AM  
 A-G Ratio 0.9 (L) 12/21/2018 11:48 AM  
 ALT (SGPT) 33 12/21/2018 11:48 AM  
  
 
 
 Assessment: 
 
 Assessment:  
 
Diagnoses and all orders for this visit: 
 
1. ASHD (arteriosclerotic heart disease) -     AMB POC EKG ROUTINE W/ 12 LEADS, INTER & REP 2. Essential hypertension, benign -     AMB POC EKG ROUTINE W/ 12 LEADS, INTER & REP 3. Systolic CHF, chronic ([facility] 4. Mixed hyperlipidemia ICD-10-CM ICD-9-CM 1. ASHD (arteriosclerotic heart disease) I25.10 414.00 AMB POC EKG ROUTINE W/ 12 LEADS, INTER & REP 2. Essential hypertension, benign I10 401.1 AMB POC EKG ROUTINE W/ 12 LEADS, INTER & REP 3. Systolic CHF, chronic (HCC) I50.22 428.22   
  428.0 4. Mixed hyperlipidemia E78.2 272.2 Orders Placed This Encounter  AMB POC EKG ROUTINE W/ 12 LEADS, INTER & REP Order Specific Question:   Reason for Exam: Answer:   routine  ACCU-CHEK DAVE PLUS METER misc Sig: USE TO TEST BLOOD SUGAR Refill:  0 Plan: 1. Atherosclerotic heart disease: History of CABG x5 2005 presenting with a significant drop in ejection fraction now 15-20%. We will proceed with cardiac catheterization 2. Systolic heart failure:  There  has been a decline in ejection fraction from 35-40% now to 15-20% while he is been on Avapro and carvedilol. Will change Avapro to [facility], have patient consult with electrophysiology for biventricular ICD placement after he has cardiac catheterization performed. 3.  Hyperlipidemia: On statin therapy LDL 64 will be due for repeat labs this month, will have them performed as part of pre-cath. 4.  Syncopal episode in December: In chart review he presented hyperglycemic, normal BUN and creatinine. Unknown if patient may have had arrhythmia which precipitated his syncopal episode. As stated above sending to electrophysiology for bi V ICD given CHF progression Follow-up with general cardiology after cardiac cath. Carlos Traylor NP This note was created using voice recognition software. Despite editing, there may be syntax errors. [facility] 2/6/2019 Patient seen, examined by me personally. Plan discussed as detailed. Agree with note as outlined by  NP. I confirm findings in history and physical exam. No additional findings noted. Agree with plan as outlined above. Evaluate grafts, switch to entresto, refer to EP.  
 
Trinity Cruz MD

## 2019-02-06 NOTE — PROGRESS NOTES
Loli Arndt DNP, ANP-BC  Subjective/HPI:     Conchita Alarcon is a 76 y.o. male is here for routine f/u. To recall Mr. Quinn Carlisle is a 49-year-old male with a history of CABG x5 in 4142, systolic heart failure with previous echo March 2018 showing ejection fraction 35-40% with no reversible ischemia on nuclear stress test at the time as well. He has maintained carvedilol and Avapro. He was admitted in December 2018 to [facility] for syncopal episode of unknown cause. A echocardiogram was performed during this admission now showing his ejection fraction had dropped to 15-20% cardiology was not consulted at the time. He has since maintained furosemide 20 mg, carvedilol and Avapro. He denies chest pain or limiting dyspnea on exertion. He has not had any recurrent dizziness or syncopal episodes since admission. PCP Provider  Jluis Figueroa MD  Past Medical History:   Diagnosis Date    CAD (coronary artery disease)     Chronic systolic HF (heart failure) [facility]     DM type 2 [facility]diabetes mellitus[facility] 2[facility]     Gout     HTN (hypertension)     Hypercholesterolemia       Past Surgical History:   Procedure Laterality Date    HX CORONARY ARTERY BYPASS GRAFT  2005     No Known Allergies   Family History   Problem Relation Age of Onset    Heart Disease Father     Heart Attack Father     Hypertension Brother       Current Outpatient Medications   Medication Sig    ACCU-CHEK DAVE PLUS METER [facility] USE TO TEST BLOOD SUGAR    JANUMET 50-1,000 mg per tablet Take 1 Tab by mouth two (2) times daily (with meals).  irbesartan (AVAPRO) 300 mg tablet Take 300 mg by mouth nightly.  multivitamin (ONE A DAY) tablet Take 1 Tab by mouth daily.  atorvastatin (LIPITOR) 10 mg tablet Take 1 Tab by mouth daily.  aspirin delayed-release 81 mg tablet Take 81 mg by mouth daily.  carvedilol (COREG) 25 mg tablet Take 25 mg by mouth two (2) times daily (with meals).     potassium chloride (KLOR-CON M10) 10 mEq tablet Take 10 mEq by mouth two (2) times a day.  furosemide (LASIX) 20 mg tablet Take 20 mg by mouth daily.  amLODIPine (NORVASC) 5 mg tablet Take 5 mg by mouth daily.  magnesium oxide (MAG-OX) 400 mg tablet Take 1 Tab by mouth two (2) times a day.  magnesium oxide 400 mg magnesium tab Take 1 Tab by mouth two (2) times a day. No current facility-administered medications for this visit. Vitals:    02/06/19 1016 02/06/19 1034   BP: 110/84 120/88   Pulse: 73    Resp: 16    SpO2: 97%    Weight: 216 lb 12.8 oz (98.3 kg)    Height: 5' 9\" (1.753 m)      Social History     Socioeconomic History    Marital status:      Spouse name: Not on file    Number of children: Not on file    Years of education: Not on file    Highest education level: Not on file   Social Needs    Financial resource strain: Not on file    Food insecurity - worry: Not on file    Food insecurity - inability: Not on file   [employer] needs - medical: Not on file   [employer] needs - non-medical: Not on file   Occupational History    Not on file   Tobacco Use    Smoking status: Current Every Day Smoker     Packs/day: 0.50     Years: 40.00     Pack years: 20.00     Types: Cigarettes    Smokeless tobacco: Never Used    Tobacco comment: 4 cigarettes daily   Substance and Sexual Activity    Alcohol use: Yes     Comment: occasionally every 2 weeks    Drug use: No    Sexual activity: Not on file   Other Topics Concern    Not on file   Social History Narrative    Not on file       I have reviewed the nurses notes, vitals, problem list, allergy list, medical history, family, social history and medications. Review of Symptoms:    General: Pt denies excessive weight gain or loss. Pt is able to conduct ADL's  HEENT: Denies blurred vision, headaches, epistaxis and difficulty swallowing. Respiratory: Denies shortness of breath, TO, wheezing or stridor.   Cardiovascular: Denies precordial pain, palpitations, edema or PND  Gastrointestinal: Denies poor appetite, indigestion, abdominal pain or blood in stool  Musculoskeletal: Denies pain or swelling from muscles or joints  Neurologic: Denies tremor, paresthesias, or sensory motor disturbance  Skin: Denies rash, itching or texture change. Physical Exam:      General: Well developed, in no acute distress, cooperative and alert  HEENT: No carotid bruits, no JVD, trach is midline. Neck Supple, PEERL, EOM intact. Heart:  Normal S1/S2 negative S3 or S4. Regular, no murmur, gallop or rub.   Respiratory: Clear bilaterally x 4, no wheezing or rales  Abdomen:   Soft, non-tender, no masses, bowel sounds are active.   Extremities:  No edema, normal cap refill, no cyanosis, atraumatic. Neuro: A&Ox3, speech clear, gait stable. Skin: Skin color is normal. No rashes or lesions.  Non diaphoretic  Vascular: 2+ pulses symmetric in all extremities    Cardiographics    ECG: Sinus rhythm  Results for orders placed or performed during the hospital encounter of 12/21/18   EKG, 12 LEAD, INITIAL   Result Value Ref Range    Ventricular Rate 67 BPM    Atrial Rate 67 BPM    P-R Interval 172 ms    QRS Duration 138 ms    Q-T Interval 436 ms    QTC Calculation (Bezet) 460 ms    Calculated P Axis 113 degrees    Calculated R Axis -60 degrees    Calculated T Axis 148 degrees    Diagnosis       Normal sinus rhythm  Left axis deviation  Left ventricular hypertrophy with QRS widening and repolarization abnormality  No previous ECGs available  Confirmed by Pj Mabry MD, Jarvis Schilder ([id number]) on 12/21/2018 12:44:45 PM           Cardiology Labs:  Lab Results   Component Value Date/Time    Cholesterol, total 148 02/20/2018 10:21 AM    HDL Cholesterol 30 (L) 02/20/2018 10:21 AM    LDL, calculated 64 02/20/2018 10:21 AM    Triglyceride 269 (H) 02/20/2018 10:21 AM       Lab Results   Component Value Date/Time    Sodium 142 12/23/2018 04:52 AM    Potassium 3.5 12/23/2018 04:52 AM    Chloride 110 (H) 12/23/2018 04:52 AM    CO2 25 12/23/2018 04:52 AM    Anion gap 7 12/23/2018 04:52 AM    Glucose 167 (H) 12/23/2018 04:52 AM    BUN 8 12/23/2018 04:52 AM    Creatinine 0.88 12/23/2018 04:52 AM    BUN/Creatinine ratio 9 (L) 12/23/2018 04:52 AM    GFR est AA >60 12/23/2018 04:52 AM    GFR est non-AA >60 12/23/2018 04:52 AM    Calcium 7.9 (L) 12/23/2018 04:52 AM    Bilirubin, total 0.9 12/21/2018 11:48 AM    AST (SGOT) 19 12/21/2018 11:48 AM    Alk. phosphatase 110 12/21/2018 11:48 AM    Protein, total 6.9 12/21/2018 11:48 AM    Albumin 3.3 (L) 12/21/2018 11:48 AM    Globulin 3.6 12/21/2018 11:48 AM    A-G Ratio 0.9 (L) 12/21/2018 11:48 AM    ALT (SGPT) 33 12/21/2018 11:48 AM           Assessment:     Assessment:     Diagnoses and all orders for this visit:    1. ASHD (arteriosclerotic heart disease)  -     AMB POC EKG ROUTINE W/ 12 LEADS, INTER & REP    2. Essential hypertension, benign  -     AMB POC EKG ROUTINE W/ 12 LEADS, INTER & REP    3. Systolic CHF, chronic ([address]    4. Mixed hyperlipidemia        ICD-10-CM ICD-9-CM    1. ASHD (arteriosclerotic heart disease) I25.10 414.00 AMB POC EKG ROUTINE W/ 12 LEADS, INTER & REP   2. Essential hypertension, benign I10 401.1 AMB POC EKG ROUTINE W/ 12 LEADS, INTER & REP   3. Systolic CHF, chronic (HCC) I50.22 428.22      428.0    4. Mixed hyperlipidemia E78.2 272.2      Orders Placed This Encounter    AMB POC EKG ROUTINE W/ 12 LEADS, INTER & REP     Order Specific Question:   Reason for Exam:     Answer:   routine    ACCU-CHEK DAVE PLUS METER misc     Sig: USE TO TEST BLOOD SUGAR     Refill:  0        Plan:     1. Atherosclerotic heart disease: History of CABG x5 2005 presenting with a significant drop in ejection fraction now 15-20%. We will proceed with cardiac catheterization   2. Systolic heart failure: There  has been a decline in ejection fraction from 35-40% now to 15-20% while he is been on Avapro and carvedilol.   Will change Avapro to [facility], have patient consult with electrophysiology for biventricular ICD placement after he has cardiac catheterization performed. 3.  Hyperlipidemia: On statin therapy LDL 64 will be due for repeat labs this month, will have them performed as part of pre-cath. 4.  Syncopal episode in December: In chart review he presented hyperglycemic, normal BUN and creatinine. Unknown if patient may have had arrhythmia which precipitated his syncopal episode. As stated above sending to electrophysiology for bi V ICD given CHF progression  Follow-up with general cardiology after cardiac cath. Darell Castillo NP    This note was created using voice recognition software. Despite editing, there may be syntax errors. [facility]    2/6/2019         Patient seen, examined by me personally. Plan discussed as detailed. Agree with note as outlined by  NP. I confirm findings in history and physical exam. No additional findings noted. Agree with plan as outlined above. Evaluate grafts, switch to entresto, refer to EP.     Imelda Vitale MD

## 2019-02-07 ENCOUNTER — DOCUMENTATION ONLY (OUTPATIENT)
Dept: CARDIOLOGY CLINIC | Age: 69
End: 2019-02-07

## 2019-02-07 NOTE — PROGRESS NOTES
Received fax from [facility] stating Enteresto 24/26 mg has been approved through 2/6/2021

## 2019-02-08 ENCOUNTER — HOSPITAL ENCOUNTER (OUTPATIENT)
Age: 69
Setting detail: OBSERVATION
Discharge: HOME | End: 2019-02-08
Attending: INTERNAL MEDICINE | Admitting: INTERNAL MEDICINE
Payer: MEDICARE

## 2019-02-08 VITALS
DIASTOLIC BLOOD PRESSURE: 88 MMHG | BODY MASS INDEX: 31.1 KG/M2 | HEART RATE: 60 BPM | OXYGEN SATURATION: 95 % | SYSTOLIC BLOOD PRESSURE: 122 MMHG | RESPIRATION RATE: 18 BRPM | TEMPERATURE: 97.5 F | WEIGHT: 210 LBS | HEIGHT: 69 IN

## 2019-02-08 DIAGNOSIS — I24.9 ACS (ACUTE CORONARY SYNDROME) (HCC): ICD-10-CM

## 2019-02-08 PROBLEM — I25.5 CARDIOMYOPATHY, ISCHEMIC: Status: ACTIVE | Noted: 2019-02-08

## 2019-02-08 PROBLEM — I42.8 NONISCHEMIC CARDIOMYOPATHY (HCC): Status: ACTIVE | Noted: 2019-02-08

## 2019-02-08 PROBLEM — Z98.890 S/P CARDIAC CATH: Status: ACTIVE | Noted: 2019-02-08

## 2019-02-08 LAB
ALBUMIN SERPL-MCNC: 4.1 G/DL (ref 3.6–4.8)
ALBUMIN/GLOB SERPL: 1.7 {RATIO} (ref 1.2–2.2)
ALP SERPL-CCNC: 94 IU/L (ref 39–117)
ALT SERPL-CCNC: 18 IU/L (ref 0–44)
AST SERPL-CCNC: 16 IU/L (ref 0–40)
BASOPHILS # BLD AUTO: 0 X10E3/UL (ref 0–0.2)
BASOPHILS NFR BLD AUTO: 1 %
BILIRUB SERPL-MCNC: 0.5 MG/DL (ref 0–1.2)
BUN SERPL-MCNC: 9 MG/DL (ref 8–27)
BUN/CREAT SERPL: 8 (ref 10–24)
CALCIUM SERPL-MCNC: 9.4 MG/DL (ref 8.6–10.2)
CHLORIDE SERPL-SCNC: 102 MMOL/L (ref 96–106)
CHOLEST SERPL-MCNC: 94 MG/DL (ref 100–199)
CK SERPL-CCNC: 452 U/L (ref 24–204)
CO2 SERPL-SCNC: 21 MMOL/L (ref 20–29)
CREAT SERPL-MCNC: 1.12 MG/DL (ref 0.76–1.27)
EOSINOPHIL # BLD AUTO: 0.1 X10E3/UL (ref 0–0.4)
EOSINOPHIL NFR BLD AUTO: 2 %
ERYTHROCYTE [DISTWIDTH] IN BLOOD BY AUTOMATED COUNT: 14 % (ref 12.3–15.4)
GLOBULIN SER CALC-MCNC: 2.4 G/DL (ref 1.5–4.5)
GLUCOSE BLD STRIP.AUTO-MCNC: 110 MG/DL (ref 65–100)
GLUCOSE BLD STRIP.AUTO-MCNC: 149 MG/DL (ref 65–100)
GLUCOSE SERPL-MCNC: 160 MG/DL (ref 65–99)
HCT VFR BLD AUTO: 46.4 % (ref 37.5–51)
HDLC SERPL-MCNC: 31 MG/DL
HGB BLD-MCNC: 15.9 G/DL (ref 13–17.7)
IMM GRANULOCYTES # BLD AUTO: 0 X10E3/UL (ref 0–0.1)
IMM GRANULOCYTES NFR BLD AUTO: 0 %
INR PPP: 1.1 (ref 0.8–1.2)
INTERPRETATION, 910389: NORMAL
LDLC SERPL CALC-MCNC: 28 MG/DL (ref 0–99)
LYMPHOCYTES # BLD AUTO: 2.1 X10E3/UL (ref 0.7–3.1)
LYMPHOCYTES NFR BLD AUTO: 40 %
MCH RBC QN AUTO: 28.9 PG (ref 26.6–33)
MCHC RBC AUTO-ENTMCNC: 34.3 G/DL (ref 31.5–35.7)
MCV RBC AUTO: 84 FL (ref 79–97)
MONOCYTES # BLD AUTO: 0.4 X10E3/UL (ref 0.1–0.9)
MONOCYTES NFR BLD AUTO: 8 %
NEUTROPHILS # BLD AUTO: 2.6 X10E3/UL (ref 1.4–7)
NEUTROPHILS NFR BLD AUTO: 49 %
PLATELET # BLD AUTO: 151 X10E3/UL (ref 150–379)
POTASSIUM SERPL-SCNC: 4 MMOL/L (ref 3.5–5.2)
PROT SERPL-MCNC: 6.5 G/DL (ref 6–8.5)
PROTHROMBIN TIME: 11.7 SEC (ref 9.1–12)
RBC # BLD AUTO: 5.5 X10E6/UL (ref 4.14–5.8)
SERVICE CMNT-IMP: ABNORMAL
SERVICE CMNT-IMP: ABNORMAL
SODIUM SERPL-SCNC: 145 MMOL/L (ref 134–144)
TRIGL SERPL-MCNC: 176 MG/DL (ref 0–149)
VLDLC SERPL CALC-MCNC: 35 MG/DL (ref 5–40)
WBC # BLD AUTO: 5.2 X10E3/UL (ref 3.4–10.8)

## 2019-02-08 PROCEDURE — 99153 MOD SED SAME PHYS/QHP EA: CPT | Performed by: INTERNAL MEDICINE

## 2019-02-08 PROCEDURE — 99218 HC RM OBSERVATION: CPT

## 2019-02-08 PROCEDURE — C1894 INTRO/SHEATH, NON-LASER: HCPCS | Performed by: INTERNAL MEDICINE

## 2019-02-08 PROCEDURE — 99152 MOD SED SAME PHYS/QHP 5/>YRS: CPT | Performed by: INTERNAL MEDICINE

## 2019-02-08 PROCEDURE — 93458 L HRT ARTERY/VENTRICLE ANGIO: CPT | Performed by: INTERNAL MEDICINE

## 2019-02-08 PROCEDURE — 77030029065 HC DRSG HEMO QCLOT ZMED -B: Performed by: INTERNAL MEDICINE

## 2019-02-08 PROCEDURE — 74011250636 HC RX REV CODE- 250/636: Performed by: INTERNAL MEDICINE

## 2019-02-08 PROCEDURE — 74011250637 HC RX REV CODE- 250/637: Performed by: NURSE PRACTITIONER

## 2019-02-08 PROCEDURE — 74011250636 HC RX REV CODE- 250/636

## 2019-02-08 PROCEDURE — C1769 GUIDE WIRE: HCPCS | Performed by: INTERNAL MEDICINE

## 2019-02-08 PROCEDURE — 74011636320 HC RX REV CODE- 636/320: Performed by: INTERNAL MEDICINE

## 2019-02-08 PROCEDURE — 77030016704 HC CATH ANGI DX PRF1 MRTM -B: Performed by: INTERNAL MEDICINE

## 2019-02-08 PROCEDURE — 77030028837 HC SYR ANGI PWR INJ COEU -A: Performed by: INTERNAL MEDICINE

## 2019-02-08 PROCEDURE — 74011250637 HC RX REV CODE- 250/637: Performed by: INTERNAL MEDICINE

## 2019-02-08 PROCEDURE — 82962 GLUCOSE BLOOD TEST: CPT

## 2019-02-08 RX ORDER — HEPARIN SODIUM 200 [USP'U]/100ML
INJECTION, SOLUTION INTRAVENOUS
Status: DISCONTINUED | OUTPATIENT
Start: 2019-02-08 | End: 2019-02-08

## 2019-02-08 RX ORDER — ACETAMINOPHEN 325 MG/1
650 TABLET ORAL
Status: DISCONTINUED | OUTPATIENT
Start: 2019-02-08 | End: 2019-02-08 | Stop reason: HOSPADM

## 2019-02-08 RX ORDER — METFORMIN HYDROCHLORIDE 500 MG/1
1000 TABLET ORAL 2 TIMES DAILY WITH MEALS
Status: DISCONTINUED | OUTPATIENT
Start: 2019-02-08 | End: 2019-02-08 | Stop reason: HOSPADM

## 2019-02-08 RX ORDER — AMLODIPINE BESYLATE 5 MG/1
5 TABLET ORAL DAILY
Status: DISCONTINUED | OUTPATIENT
Start: 2019-02-09 | End: 2019-02-08 | Stop reason: HOSPADM

## 2019-02-08 RX ORDER — GUAIFENESIN 100 MG/5ML
LIQUID (ML) ORAL AS NEEDED
Status: DISCONTINUED | OUTPATIENT
Start: 2019-02-08 | End: 2019-02-08

## 2019-02-08 RX ORDER — ATORVASTATIN CALCIUM 10 MG/1
10 TABLET, FILM COATED ORAL DAILY
Status: DISCONTINUED | OUTPATIENT
Start: 2019-02-09 | End: 2019-02-08 | Stop reason: HOSPADM

## 2019-02-08 RX ORDER — ASPIRIN 81 MG/1
81 TABLET ORAL DAILY
Status: DISCONTINUED | OUTPATIENT
Start: 2019-02-09 | End: 2019-02-08 | Stop reason: HOSPADM

## 2019-02-08 RX ORDER — FUROSEMIDE 20 MG/1
20 TABLET ORAL DAILY
Status: DISCONTINUED | OUTPATIENT
Start: 2019-02-09 | End: 2019-02-08 | Stop reason: HOSPADM

## 2019-02-08 RX ORDER — LIDOCAINE HYDROCHLORIDE 10 MG/ML
INJECTION, SOLUTION EPIDURAL; INFILTRATION; INTRACAUDAL; PERINEURAL AS NEEDED
Status: DISCONTINUED | OUTPATIENT
Start: 2019-02-08 | End: 2019-02-08

## 2019-02-08 RX ORDER — CARVEDILOL 12.5 MG/1
25 TABLET ORAL 2 TIMES DAILY WITH MEALS
Status: DISCONTINUED | OUTPATIENT
Start: 2019-02-08 | End: 2019-02-08 | Stop reason: HOSPADM

## 2019-02-08 RX ORDER — FENTANYL CITRATE 50 UG/ML
INJECTION, SOLUTION INTRAMUSCULAR; INTRAVENOUS AS NEEDED
Status: DISCONTINUED | OUTPATIENT
Start: 2019-02-08 | End: 2019-02-08

## 2019-02-08 RX ORDER — POTASSIUM CHLORIDE 750 MG/1
10 TABLET, FILM COATED, EXTENDED RELEASE ORAL 2 TIMES DAILY
Status: DISCONTINUED | OUTPATIENT
Start: 2019-02-08 | End: 2019-02-08 | Stop reason: HOSPADM

## 2019-02-08 RX ORDER — SODIUM CHLORIDE 0.9 % (FLUSH) 0.9 %
5-40 SYRINGE (ML) INJECTION EVERY 8 HOURS
Status: DISCONTINUED | OUTPATIENT
Start: 2019-02-08 | End: 2019-02-08 | Stop reason: HOSPADM

## 2019-02-08 RX ORDER — SODIUM CHLORIDE 0.9 % (FLUSH) 0.9 %
5-40 SYRINGE (ML) INJECTION AS NEEDED
Status: DISCONTINUED | OUTPATIENT
Start: 2019-02-08 | End: 2019-02-08 | Stop reason: HOSPADM

## 2019-02-08 RX ORDER — MIDAZOLAM HYDROCHLORIDE 1 MG/ML
INJECTION, SOLUTION INTRAMUSCULAR; INTRAVENOUS AS NEEDED
Status: DISCONTINUED | OUTPATIENT
Start: 2019-02-08 | End: 2019-02-08

## 2019-02-08 RX ADMIN — POTASSIUM CHLORIDE 10 MEQ: 750 TABLET, EXTENDED RELEASE ORAL at 18:40

## 2019-02-08 RX ADMIN — SACUBITRIL AND VALSARTAN 1 TABLET: 24; 26 TABLET, FILM COATED ORAL at 18:40

## 2019-02-08 RX ADMIN — CARVEDILOL 25 MG: 12.5 TABLET, FILM COATED ORAL at 18:40

## 2019-02-08 NOTE — Clinical Note
TRANSFER - OUT REPORT:  
 
Verbal report given to: DEBI Espinal. Report consisted of patient's Situation, Background, Assessment and  
Recommendations(SBAR). Opportunity for questions and clarification was provided. Patient transported with a Registered Nurse and [address] [place] [address] / [facility]. Patient transported to: Cath Lab Recovery.

## 2019-02-08 NOTE — INTERVAL H&P NOTE
H&P Update: 
Morelia Blair was seen and examined. History and physical has been reviewed. The patient has been examined.  There have been no significant clinical changes since the completion of the originally dated History and Physical. 
 
Signed By: Sriram Driscoll MD   
 February 8, 2019 8:42 AM

## 2019-02-08 NOTE — PROGRESS NOTES
Cardiac Cath Lab Procedure Area Arrival Note: 
 
Kendal Hurst arrived to Cardiac Cath Lab, Procedure Area. Patient identifiers verified with NAME and DATE OF BIRTH. Procedure verified with patient. Consent forms verified. Allergies verified. Patient informed of procedure and plan of care. Questions answered with review. Patient voiced understanding of procedure and plan of care. Patient on cardiac monitor, non-invasive blood pressure, SPO2 monitor. On 2L of O2 @ 2 lpm via NC.  IV of NS on pump at 100 ml/hr. Patient status doing well without problems. Patient is A&Ox 4. Patient reports no complaaints. Patient medicated during procedure with orders obtained and verified by Dr. Myke Driver. Refer to patients Cardiac Cath Lab PROCEDURE REPORT for vital signs, assessment, status, and response during procedure, printed at end of case. Printed report on chart or scanned into chart.

## 2019-02-08 NOTE — CONSULTS
[address], [address] [facility]  [phone]        Date of  Admission: 2/8/2019  8:00 AM     Admission type:Elective    Consult for:  BiV ICD  Consult by: Dr John Hernandez NP     Subjective:     Glen Enriquez is a 76 y.o. male admitted for ACS (acute coronary syndrome) ([facility] [I24.9]. PMHX of HTN, CAD s/p CABG 2005, hyperlipidemia, DM type 2. Patient reports a syncopal episode of syncope in Dec. He took his medications, went to [address] [place] [address] when he had urgent need to evacuate his bowels. He reports intense diarrhea with improvement in symptoms. He went out and sat down with feeling of lethergy, and abdominal discomfort. He closed his eyes and passed out. During syncope he evacuated his bowels again. [facility] he was dx with dehydration, EF noted to be 20% (down from 35-40%). Previous treatment/evaluation includes cardiac catheterization , today with patent grafts. Cardiac risk factors: CAD, s/p CABG, smoking/ tobacco exposure, family history, dyslipidemia, diabetes mellitus, male gender, hypertension.     Patient Active Problem List    Diagnosis Date Noted    Syncope 12/21/2018    ASHD (arteriosclerotic heart disease) 47/79/4658    Systolic CHF, chronic ([facility] 09/17/2013    Essential hypertension, benign 09/17/2013    Mixed hyperlipidemia 09/17/2013      Lakhwinder Barnard MD  Past Medical History:   Diagnosis Date    CAD (coronary artery disease)     Chronic systolic HF (heart failure) ([facility]     DM type 2 (diabetes mellitus, type 2) ([facility]     Gout     HTN (hypertension)     Hypercholesterolemia       Social History     Socioeconomic History    Marital status:      Spouse name: Not on file    Number of children: Not on file    Years of education: Not on file    Highest education level: Not on file   Tobacco Use    Smoking status: Current Every Day Smoker     Packs/day: 0.50     Years: 40.00     Pack years: 20.00     Types: Cigarettes  Smokeless tobacco: Never Used    Tobacco comment: 5 cigarettes a day   Substance and Sexual Activity    Alcohol use: Yes     Comment: occasionally every 2 weeks    Drug use: No     No Known Allergies   Family History   Problem Relation Age of Onset    Heart Disease Father     Heart Attack Father     Hypertension Brother       No current facility-administered medications for this encounter. Review of Symptoms:  Constitutional: + syncope  Eyes: negative  Ears, nose, mouth, throat, and face: negative  Respiratory: negative  Cardiovascular: negative  Gastrointestinal: negative  Genitourinary:negative  Musculoskeletal:negative  Neurological: negative  Endocrine: negative     Subjective:      Visit Vitals  /76   Pulse (!) 56   Temp 98.2 °F (36.8 °C)   Resp 9   Ht 5' 9\" (1.753 m)   Wt 210 lb (95.3 kg)   SpO2 99%   BMI 31.01 kg/m²       Physical:  Heart: Regular, bradycardic S1 WNL and S2 WNL. No S3 or S4, no m/S3/JVD, no carotid bruits   Lungs: clear   Abdomen: Soft, +BS, NTND   Extremities: LE jesus +DP/PT, no edema   Neurologic: Grossly normal    Data Review:   No results for input(s): WBC, HGB, HCT, PLT, HGBEXT, HCTEXT, PLTEXT, HGBEXT, HCTEXT, PLTEXT in the last 72 hours. No results for input(s): NA, K, CL, CO2, GLU, BUN, CREA, CA, MG, PHOS, ALB, TBIL, TBILI, SGOT, ALT, INR in the last 72 hours. No lab exists for component:  BNP, INREXT, INREXT    No results for input(s): TROIQ, CPK, CKMB in the last 72 hours. No intake or output data in the 24 hours ending 02/08/19 1301     Cardiographics    Telemetry: sinus bradycardia, ventricular rate 52    Echocardiogram: 12/21/19 --EF 15 % to 20 %, moderate diffuse hypokinesis, mild Mr. Assessment:            Active Problems:    ASHD (arteriosclerotic heart disease) (9/17/2013)      Overview: CABG 2005, LIMA to LAD, Y graft to LAD-OM1.       Systolic CHF, chronic ([address] (9/17/2013)      Essential hypertension, benign (9/17/2013)      Mixed hyperlipidemia (9/17/2013)      Syncope (12/21/2018)         Plan:     Maritza Milton is a pleasant 76year old male with hx of of HTN, CAD s/p CABG 2005, hyperlipidemia, DM type 2 with recent syncopal episode, patient grafts. He is candidate for BiV ICD. The patient with PMHx of cardiomyopathy and CHD is referred for BiV implantable cardiac defibrillator. The left ventricular ejection fraction is 15-20% per cardiac cath and the patient is NYHA Class III. The patient has been on ARB and beta blocker therapy for greater than 3 months. I discussed the risks/benefits/alternatives of the procedure with the patient. Risks include (but are not limited to) bleeding, infection, cva/mi/death. The patient understands and would like to proceed. Thank you for this interesting consultation. Pallavi Mckeon ANP    Patient seen and examined by me with nurse practitioner. I personally performed all components of the history, physical, and medical decision making and agree with the assessment and plan with minor modifications as noted. Syncope in the setting of ischemic cardiomyopathy on med rx. Has wide qrs and class III CHF. Will benefit from BiV- ICD. Needs lifevest prior to dc. Will schedule biv-icd for next week. I discussed the risks/benefits/alternatives of the procedure with the patient. Risks include (but are not limited to) bleeding, heart block, infection, cva/mi/tamponade/death. The patient understands and agrees to proceed. Thank you for this interesting consultation.       Lesly Reyes MD, Alanna Bloch

## 2019-02-08 NOTE — PROGRESS NOTES
SHEATH PULL NOTE: 
 
Patient informed of procedure with questions answered with review. Sheath site prepped with Chloraprep swab. 5 fr sheath in rfa pulled by ANDRES Melgar. Hand hold and quick clot, with manual compression to site. No bleeding, no hematoma, no pain at site. Hemostasis obtained with hand hold/manual compression at site. Patient tolerated well. No change in status. Handhold for 15 minutes. No change at site. Occlusive dressing applied to site. No bleeding, no hematoma, no pain/discomfort at site. Groin instructions provided with review. Continue to monitor procedure site and patient status. *Advised patient to keep head flat and extremity flat to decrease risk of bleeding. *Recommended that patient not drink for ONE HOUR post sheath pull completion. *Recommended that patient not eat for TWO HOURS post sheath pull completion. *Instructed patient on rationale for delay of PO products to decrease risk for aspiration and if additional treatment to procedure site is required. Patient verbalized understanding of instructions with review.

## 2019-02-08 NOTE — DISCHARGE INSTRUCTIONS
[address] [place] [address], [address] [facility]  [phone]      CARDIOLOGY DISCHARGE INSTRUCTIONS      Patient ID:  Kendal Hurst  [id number]  13 y.o.  1950    Admit Date: 2/8/2019    Discharge Date: 2/8/2019     Admitting Physician: Benjamin Rivera MD     Discharge Physician: Jose A Alford NP    Admission Diagnoses:   ACS (acute coronary syndrome) [facility]) [I24.9]    Discharge Diagnoses: Active Problems:    ASHD (arteriosclerotic heart disease) (9/17/2013)      Overview: CABG 2005, LIMA to LAD, Y graft to LAD-OM1. Systolic CHF, chronic ([facility] (9/17/2013)      Essential hypertension, benign (9/17/2013)      Mixed hyperlipidemia (9/17/2013)      Syncope (12/21/2018)      S/P cardiac cath (2/8/2019)      Overview: 2/8/19 cardiac cath with nonobstructive disease      Nonischemic cardiomyopathy ([facility] (2/8/2019)        Discharge Condition: Good    Cardiology Procedures this Admission:  Diagnostic left heart catheterization    Disposition: home      Reference discharge instructions provided by nursing for diet and activity. Signed:  Jose A Alford NP  2/8/2019  1:15 PM    CARDIAC CATHETERIZATION/PCI DISCHARGE INSTRUCTIONS    It is normal to feel tired the first couple days. Take it easy and follow the physicians instructions. CHECK THE CATHETER INSERTION SITE DAILY:    You may shower 24 hours after the procedure, remove the bandage during showering. Wash with soap and water and pat dry. Gentle cleaning of the site with soap and water is sufficient, cover with a dry clean dressing or bandage. Do not apply creams or powders to the area. Do not sit in a bathtub or pool of water for 7 days or until wound has completely healed. Temporary bruising and discomfort is normal and may last a few weeks. You may have a  formation of a small lump at the site which may last up to 6 weeks.     CALL THE PHYSICIANS:    If the site becomes red, swollen or feels warm to the touch  If there is bleeding or drainage or if there is unusual pain at the groin or down the leg. If there is any bleeding, lie down, apply pressure or have someone apply pressure with a clean cloth until the bleeding stops. If the bleeding continues, call 911 to be transported to the hospital.  DO NOT DRIVE YOURSELF, [address]. ACTIVITY:    For the first 24-48 hours or as instructed by the physician:  No lifting, pushing or pulling over 10 pounds and no straining the insertion site. Do not life grocery bags or the garbage can, do not run the vacuum  or  for 7 days. Start with short walks as in the hospital and gradually increase as tolerated each day. It is recommended to walk 30 minutes 5-7 days per week. Follow your physicians instructions on activity. Avoid walking outside in extremes of heat or cold. Walk inside when it is cold and windy or hot and humid. Things to keep in mind:  No driving for at least 24 hours, or as designated by your physician. Limit the number of times you go up and down the stairs  Take rests and pace yourself with activity. Be careful and do not strain with bowel movements. MEDICATIONS:    Take all medications as prescribed  Call your physician if you have any questions  Keep an updated list of your medications with you at all times and give a list to your physician and pharmacist        SIGNS AND SYMPTOMS:    Be cautious of symptoms of angina or recurrent symptoms such as chest discomfort, unusual shortness of breath or fatigue. These could be symptoms of restenosis, a new blockage or a heart attack. If your symptoms are relieved with rest it is still recommended that you notify your physician of recurrent chest pain or discomfort.   FOR CHEST PAIN or symptoms of angina not relieved with rest:  If the discomfort is not relieved with rest, and you have been prescribed Nitroglycerin, take as directed (taken under the tongue, one at a time 5 minutes apart for a total of 3 doses). If the discomfort is not relieved after the 3rd nitroglycerin, call 911. If you have not been prescribed Nitroglycerin  and your chest discomfort is not relieved with rest, call 911. AFTER CARE:    Follow up with your physician as instructed. Follow a heart healthy diet with proper portion control, daily stress management, daily exercise, blood pressure and cholesterol control , and smoking cessation.

## 2019-02-08 NOTE — PROGRESS NOTES
TRANSFER - OUT REPORT: 
 
Verbal report given to Dahlia Ochoa RN(name) on Maritza Milton  being transferred to [facility](unit) for routine progression of care Report consisted of patients Situation, Background, Assessment and  
Recommendations(SBAR). Information from the following report(s) Procedure Summary and MAR was reviewed with the receiving nurse. Lines:    
 
Opportunity for questions and clarification was provided. Patient transported with: 
 Registered Nurse

## 2019-02-08 NOTE — Clinical Note
Mallampati: Class II - soft palate, uvula, fauces visible. ASA: Class 2 - patient with mild systemic disease.

## 2019-02-08 NOTE — PROGRESS NOTES
TRANSFER - IN REPORT: 
 
Verbal report received from Nati Hoffman RN on Dara Sears  being received from [address] [place] [address] for routine progression of care. Report consisted of patients Situation, Background, Assessment and Recommendations(SBAR). Information from the following report(s) Procedure Summary and MAR was reviewed with the receiving clinician. Opportunity for questions and clarification was provided. Assessment completed upon patients arrival to [address] [place] [address] and care assumed. Cardiac Cath Lab Recovery Arrival Note: 
 
Dara Sears arrived to [facility] recovery area. Patient procedure= LHC. Patient on cardiac monitor, non-invasive blood pressure, SPO2 monitor. On O2 @ 2 lpm via NC.  IV  of NS on pump at 100 ml/hr. Patient status doing well without problems. Patient is A&Ox 3. Patient reports no c/o. PROCEDURE SITE CHECK: 
 
Procedure site:without any bleeding and no hematoma, no pain/discomfort reported at procedure site. No change in patient status. Continue to monitor patient and status.

## 2019-02-08 NOTE — PROGRESS NOTES
Call from [place], [facility] with [facility]. Pt received lifevest. He is scheduled for outpatient BiV ICD in the next week.   Ok to go home with lifevest.

## 2019-02-08 NOTE — H&P (VIEW-ONLY)
[address] [address] [address] [facility]  [phone]        Date of  Admission: 2/8/2019  8:00 AM     Admission type:Elective    Consult for:  BiV ICD  Consult by: Dr Precious Leo, ELVA     Subjective:     Estephania Mitchell is a 76 y.o. male admitted for ACS (acute coronary syndrome) ([facility] [I24.9]. PMHX of HTN, CAD s/p CABG 2005, hyperlipidemia, DM type 2. Patient reports a syncopal episode of syncope in Dec. He took his medications, went to [place] when he had urgent need to evacuate his bowels. He reports intense diarrhea with improvement in symptoms. He went out and sat down with feeling of lethergy, and abdominal discomfort. He closed his eyes and passed out. During syncope he evacuated his bowels again. He was taken to [facility] where he was dx with dehydration, EF noted to be 20% (down from 35-40%). Previous treatment/evaluation includes cardiac catheterization , today with patent grafts. Cardiac risk factors: CAD, s/p CABG, smoking/ tobacco exposure, family history, dyslipidemia, diabetes mellitus, male gender, hypertension.     Patient Active Problem List    Diagnosis Date Noted    Syncope 12/21/2018    ASHD (arteriosclerotic heart disease) 60/37/9373    Systolic CHF, chronic ([facility] 09/17/2013    Essential hypertension, benign 09/17/2013    Mixed hyperlipidemia 09/17/2013      Aurora Weathers MD  Past Medical History:   Diagnosis Date    CAD (coronary artery disease)     [facility]     DM type 2 (diabetes mellitus, type 2) ([facility]     Gout     HTN (hypertension)     Hypercholesterolemia       Social History     Socioeconomic History    Marital status:      Spouse name: Not on file    Number of children: Not on file    Years of education: Not on file    Highest education level: Not on file   Tobacco Use    Smoking status: Current Every Day Smoker     Packs/day: 0.50     Years: 40.00     Pack years: 20.00     Types: Cigarettes  Smokeless tobacco: Never Used    Tobacco comment: 5 cigarettes a day   Substance and Sexual Activity    Alcohol use: Yes     Comment: occasionally every 2 weeks    Drug use: No     No Known Allergies   Family History   Problem Relation Age of Onset    Heart Disease Father     Heart Attack Father     Hypertension Brother       No current facility-administered medications for this encounter. Review of Symptoms:  Constitutional: + syncope  Eyes: negative  Ears, nose, mouth, throat, and face: negative  Respiratory: negative  Cardiovascular: negative  Gastrointestinal: negative  Genitourinary:negative  Musculoskeletal:negative  Neurological: negative  Endocrine: negative     Subjective:      Visit Vitals  /76   Pulse (!) 56   Temp 98.2 °F (36.8 °C)   Resp 9   Ht 5' 9\" (1.753 m)   Wt 210 lb (95.3 kg)   SpO2 99%   BMI 31.01 kg/m²       Physical:  Heart: Regular, bradycardic S1 WNL and S2 WNL. No S3 or S4, no m/S3/JVD, no carotid bruits   Lungs: clear   Abdomen: Soft, +BS, NTND   Extremities: LE jesus +DP/PT, no edema   Neurologic: Grossly normal    Data Review:   No results for input(s): WBC, HGB, HCT, PLT, HGBEXT, HCTEXT, PLTEXT, HGBEXT, HCTEXT, PLTEXT in the last 72 hours. No results for input(s): NA, K, CL, CO2, GLU, BUN, CREA, CA, MG, PHOS, ALB, TBIL, TBILI, SGOT, ALT, INR in the last 72 hours. No lab exists for component:  BNP, INREXT, INREXT    No results for input(s): TROIQ, CPK, CKMB in the last 72 hours. No intake or output data in the 24 hours ending 02/08/19 1301     Cardiographics    Telemetry: sinus bradycardia, ventricular rate 52    Echocardiogram: 12/21/19 --EF 15 % to 20 %, moderate diffuse hypokinesis, mild Mr. Assessment:            Active Problems:    ASHD (arteriosclerotic heart disease) (9/17/2013)      Overview: CABG 2005, LIMA to LAD, Y graft to LAD-OM1.       Systolic CHF, chronic ([address] (9/17/2013)      Essential hypertension, benign (9/17/2013)      Mixed hyperlipidemia (9/17/2013)      Syncope (12/21/2018)         Plan:     Rudy Khan is a pleasant 76year old male with hx of of HTN, CAD s/p CABG 2005, hyperlipidemia, DM type 2 with recent syncopal episode, patient grafts. He is candidate for BiV ICD. The patient with PMHx of cardiomyopathy and CHD is referred for BiV implantable cardiac defibrillator. The left ventricular ejection fraction is 15-20% per cardiac cath and the patient is NYHA Class III. The patient has been on ARB and beta blocker therapy for greater than 3 months. I discussed the risks/benefits/alternatives of the procedure with the patient. Risks include (but are not limited to) bleeding, infection, cva/mi/death. The patient understands and would like to proceed. Thank you for this interesting consultation. Pallavi Israel ANP    Patient seen and examined by me with nurse practitioner. I personally performed all components of the history, physical, and medical decision making and agree with the assessment and plan with minor modifications as noted. Syncope in the setting of ischemic cardiomyopathy on med rx. Has wide qrs and class III CHF. Will benefit from BiV- ICD. Needs lifevest prior to dc. Will schedule biv-icd for next week. I discussed the risks/benefits/alternatives of the procedure with the patient. Risks include (but are not limited to) bleeding, heart block, infection, cva/mi/tamponade/death. The patient understands and agrees to proceed. Thank you for this interesting consultation.       Megan Jarrell MD, Kaylee Rose

## 2019-02-08 NOTE — Clinical Note
Single view of the aortic root obtained using power injection. Total volume = 25 mL. Rate = 10 mL/sec.

## 2019-02-22 DIAGNOSIS — I50.22 SYSTOLIC CHF, CHRONIC (HCC): ICD-10-CM

## 2019-02-22 DIAGNOSIS — I25.5 CARDIOMYOPATHY, ISCHEMIC: ICD-10-CM

## 2019-02-22 DIAGNOSIS — R55 SYNCOPE, UNSPECIFIED SYNCOPE TYPE: ICD-10-CM

## 2019-02-22 DIAGNOSIS — I25.10 ASHD (ARTERIOSCLEROTIC HEART DISEASE): Primary | ICD-10-CM

## 2019-02-22 RX ORDER — ATORVASTATIN CALCIUM 10 MG/1
TABLET, FILM COATED ORAL
Qty: 90 TAB | Refills: 3 | Status: SHIPPED | OUTPATIENT
Start: 2019-02-22 | End: 2019-11-27 | Stop reason: SDUPTHER

## 2019-02-28 ENCOUNTER — HOSPITAL ENCOUNTER (OUTPATIENT)
Dept: GENERAL RADIOLOGY | Age: 69
Discharge: HOME | End: 2019-02-28
Payer: MEDICARE

## 2019-02-28 DIAGNOSIS — R55 SYNCOPE, UNSPECIFIED SYNCOPE TYPE: ICD-10-CM

## 2019-02-28 DIAGNOSIS — I25.5 CARDIOMYOPATHY, ISCHEMIC: ICD-10-CM

## 2019-02-28 DIAGNOSIS — I25.10 ASHD (ARTERIOSCLEROTIC HEART DISEASE): ICD-10-CM

## 2019-02-28 DIAGNOSIS — I50.22 SYSTOLIC CHF, CHRONIC (HCC): ICD-10-CM

## 2019-02-28 PROCEDURE — 71046 X-RAY EXAM CHEST 2 VIEWS: CPT

## 2019-03-01 LAB
ALBUMIN SERPL-MCNC: 4.1 G/DL (ref 3.6–4.8)
ALBUMIN/GLOB SERPL: 1.7 {RATIO} (ref 1.2–2.2)
ALP SERPL-CCNC: 84 IU/L (ref 39–117)
ALT SERPL-CCNC: 19 IU/L (ref 0–44)
AST SERPL-CCNC: 20 IU/L (ref 0–40)
BILIRUB SERPL-MCNC: 0.3 MG/DL (ref 0–1.2)
BUN SERPL-MCNC: 11 MG/DL (ref 8–27)
BUN/CREAT SERPL: 11 (ref 10–24)
CALCIUM SERPL-MCNC: 9.2 MG/DL (ref 8.6–10.2)
CHLORIDE SERPL-SCNC: 104 MMOL/L (ref 96–106)
CO2 SERPL-SCNC: 24 MMOL/L (ref 20–29)
CREAT SERPL-MCNC: 1.02 MG/DL (ref 0.76–1.27)
ERYTHROCYTE [DISTWIDTH] IN BLOOD BY AUTOMATED COUNT: 14.1 % (ref 12.3–15.4)
GLOBULIN SER CALC-MCNC: 2.4 G/DL (ref 1.5–4.5)
GLUCOSE SERPL-MCNC: 139 MG/DL (ref 65–99)
HCT VFR BLD AUTO: 45.4 % (ref 37.5–51)
HGB BLD-MCNC: 15.5 G/DL (ref 13–17.7)
INR PPP: 1.1 (ref 0.8–1.2)
MCH RBC QN AUTO: 28.7 PG (ref 26.6–33)
MCHC RBC AUTO-ENTMCNC: 34.1 G/DL (ref 31.5–35.7)
MCV RBC AUTO: 84 FL (ref 79–97)
PLATELET # BLD AUTO: 132 X10E3/UL (ref 150–379)
POTASSIUM SERPL-SCNC: 4 MMOL/L (ref 3.5–5.2)
PROT SERPL-MCNC: 6.5 G/DL (ref 6–8.5)
PROTHROMBIN TIME: 11.7 SEC (ref 9.1–12)
RBC # BLD AUTO: 5.4 X10E6/UL (ref 4.14–5.8)
SODIUM SERPL-SCNC: 145 MMOL/L (ref 134–144)
WBC # BLD AUTO: 4.3 X10E3/UL (ref 3.4–10.8)

## 2019-03-08 ENCOUNTER — HOSPITAL ENCOUNTER (OUTPATIENT)
Age: 69
Discharge: HOME | End: 2019-03-09
Attending: INTERNAL MEDICINE | Admitting: INTERNAL MEDICINE
Payer: MEDICARE

## 2019-03-08 ENCOUNTER — APPOINTMENT (OUTPATIENT)
Dept: GENERAL RADIOLOGY | Age: 69
End: 2019-03-08
Attending: INTERNAL MEDICINE
Payer: MEDICARE

## 2019-03-08 ENCOUNTER — ANESTHESIA (OUTPATIENT)
Dept: CARDIAC CATH/INVASIVE PROCEDURES | Age: 69
End: 2019-03-08
Payer: MEDICARE

## 2019-03-08 ENCOUNTER — ANESTHESIA EVENT (OUTPATIENT)
Dept: CARDIAC CATH/INVASIVE PROCEDURES | Age: 69
End: 2019-03-08
Payer: MEDICARE

## 2019-03-08 DIAGNOSIS — I42.9 CARDIOMYOPATHY, UNSPECIFIED TYPE (HCC): ICD-10-CM

## 2019-03-08 LAB
GLUCOSE BLD STRIP.AUTO-MCNC: 202 MG/DL (ref 65–100)
GLUCOSE BLD STRIP.AUTO-MCNC: 251 MG/DL (ref 65–100)
GLUCOSE BLD STRIP.AUTO-MCNC: 263 MG/DL (ref 65–100)
SERVICE CMNT-IMP: ABNORMAL

## 2019-03-08 PROCEDURE — 77030018836 HC SOL IRR NACL ICUM -A: Performed by: INTERNAL MEDICINE

## 2019-03-08 PROCEDURE — 77030037713 HC CLOSR DEV INCIS ZIP STRY -B: Performed by: INTERNAL MEDICINE

## 2019-03-08 PROCEDURE — C1893 INTRO/SHEATH, FIXED,NON-PEEL: HCPCS | Performed by: INTERNAL MEDICINE

## 2019-03-08 PROCEDURE — C1751 CATH, INF, PER/CENT/MIDLINE: HCPCS | Performed by: INTERNAL MEDICINE

## 2019-03-08 PROCEDURE — 74011250636 HC RX REV CODE- 250/636

## 2019-03-08 PROCEDURE — 77030039825 HC MSK NSL PAP SUPERNO2VA VYRM -B: Performed by: NURSE ANESTHETIST, CERTIFIED REGISTERED

## 2019-03-08 PROCEDURE — 33249 INSJ/RPLCMT DEFIB W/LEAD(S): CPT | Performed by: INTERNAL MEDICINE

## 2019-03-08 PROCEDURE — 77030018729 HC ELECTRD DEFIB PAD CARD -B: Performed by: INTERNAL MEDICINE

## 2019-03-08 PROCEDURE — C1898 LEAD, PMKR, OTHER THAN TRANS: HCPCS | Performed by: INTERNAL MEDICINE

## 2019-03-08 PROCEDURE — C1777 LEAD, AICD, ENDO SINGLE COIL: HCPCS | Performed by: INTERNAL MEDICINE

## 2019-03-08 PROCEDURE — 77030012468 HC VLV BLEEDBK CNTRL ABBT -B: Performed by: INTERNAL MEDICINE

## 2019-03-08 PROCEDURE — 77030002996 HC SUT SLK J&J -A: Performed by: INTERNAL MEDICINE

## 2019-03-08 PROCEDURE — C1894 INTRO/SHEATH, NON-LASER: HCPCS | Performed by: INTERNAL MEDICINE

## 2019-03-08 PROCEDURE — 77030031139 HC SUT VCRL2 J&J -A: Performed by: INTERNAL MEDICINE

## 2019-03-08 PROCEDURE — C1769 GUIDE WIRE: HCPCS | Performed by: INTERNAL MEDICINE

## 2019-03-08 PROCEDURE — 74011000250 HC RX REV CODE- 250

## 2019-03-08 PROCEDURE — 74011000250 HC RX REV CODE- 250: Performed by: INTERNAL MEDICINE

## 2019-03-08 PROCEDURE — 74011250636 HC RX REV CODE- 250/636: Performed by: INTERNAL MEDICINE

## 2019-03-08 PROCEDURE — 71045 X-RAY EXAM CHEST 1 VIEW: CPT

## 2019-03-08 PROCEDURE — 77030019580 HC CBL PACE MEDT -B: Performed by: INTERNAL MEDICINE

## 2019-03-08 PROCEDURE — C1887 CATHETER, GUIDING: HCPCS | Performed by: INTERNAL MEDICINE

## 2019-03-08 PROCEDURE — 74011250637 HC RX REV CODE- 250/637: Performed by: INTERNAL MEDICINE

## 2019-03-08 PROCEDURE — 74011636320 HC RX REV CODE- 636/320: Performed by: INTERNAL MEDICINE

## 2019-03-08 PROCEDURE — 82962 GLUCOSE BLOOD TEST: CPT

## 2019-03-08 PROCEDURE — 76060000033 HC ANESTHESIA 1 TO 1.5 HR: Performed by: INTERNAL MEDICINE

## 2019-03-08 PROCEDURE — 77030018673: Performed by: INTERNAL MEDICINE

## 2019-03-08 PROCEDURE — C1900 LEAD, CORONARY VENOUS: HCPCS | Performed by: INTERNAL MEDICINE

## 2019-03-08 PROCEDURE — C1882 AICD, OTHER THAN SING/DUAL: HCPCS | Performed by: INTERNAL MEDICINE

## 2019-03-08 DEVICE — STEROID-ELUTING BIPOLAR PACE/SENSE AND DEFIBRILLATION LEAD
Type: IMPLANTABLE DEVICE | Status: FUNCTIONAL
Brand: ENDOTAK RELIANCE® SG

## 2019-03-08 DEVICE — PACE/SENSE LEAD
Type: IMPLANTABLE DEVICE | Status: FUNCTIONAL
Brand: ACUITY™ X4 SPIRAL L

## 2019-03-08 DEVICE — CARDIAC RESYNCHRONIZATION THERAPY DEFIBRILLATOR
Type: IMPLANTABLE DEVICE | Status: FUNCTIONAL
Brand: VIGILANT™ X4 CRT-D

## 2019-03-08 DEVICE — ENDOCARDIAL STEROID-ELUTING MR CONDITIONAL STYLET DELIVERED PACE/SENSE LEAD
Type: IMPLANTABLE DEVICE | Status: FUNCTIONAL
Brand: INGEVITY™ MRI

## 2019-03-08 RX ORDER — FENTANYL CITRATE 50 UG/ML
INJECTION, SOLUTION INTRAMUSCULAR; INTRAVENOUS AS NEEDED
Status: DISCONTINUED | OUTPATIENT
Start: 2019-03-08 | End: 2019-03-08 | Stop reason: HOSPADM

## 2019-03-08 RX ORDER — ONDANSETRON 2 MG/ML
INJECTION INTRAMUSCULAR; INTRAVENOUS AS NEEDED
Status: DISCONTINUED | OUTPATIENT
Start: 2019-03-08 | End: 2019-03-08 | Stop reason: HOSPADM

## 2019-03-08 RX ORDER — LIDOCAINE HYDROCHLORIDE 20 MG/ML
INJECTION, SOLUTION INFILTRATION; PERINEURAL AS NEEDED
Status: DISCONTINUED | OUTPATIENT
Start: 2019-03-08 | End: 2019-03-08 | Stop reason: HOSPADM

## 2019-03-08 RX ORDER — SODIUM CHLORIDE 0.9 % (FLUSH) 0.9 %
5-40 SYRINGE (ML) INJECTION EVERY 8 HOURS
Status: DISCONTINUED | OUTPATIENT
Start: 2019-03-08 | End: 2019-03-09 | Stop reason: HOSPADM

## 2019-03-08 RX ORDER — FUROSEMIDE 20 MG/1
20 TABLET ORAL DAILY
Status: DISCONTINUED | OUTPATIENT
Start: 2019-03-09 | End: 2019-03-09 | Stop reason: HOSPADM

## 2019-03-08 RX ORDER — NALOXONE HYDROCHLORIDE 0.4 MG/ML
0.4 INJECTION, SOLUTION INTRAMUSCULAR; INTRAVENOUS; SUBCUTANEOUS AS NEEDED
Status: DISCONTINUED | OUTPATIENT
Start: 2019-03-08 | End: 2019-03-09 | Stop reason: HOSPADM

## 2019-03-08 RX ORDER — ATORVASTATIN CALCIUM 10 MG/1
10 TABLET, FILM COATED ORAL
Status: DISCONTINUED | OUTPATIENT
Start: 2019-03-08 | End: 2019-03-09 | Stop reason: HOSPADM

## 2019-03-08 RX ORDER — PHENYLEPHRINE HCL IN 0.9% NACL 0.4MG/10ML
SYRINGE (ML) INTRAVENOUS AS NEEDED
Status: DISCONTINUED | OUTPATIENT
Start: 2019-03-08 | End: 2019-03-08 | Stop reason: HOSPADM

## 2019-03-08 RX ORDER — HYDROCODONE BITARTRATE AND ACETAMINOPHEN 5; 325 MG/1; MG/1
1 TABLET ORAL
Status: DISCONTINUED | OUTPATIENT
Start: 2019-03-08 | End: 2019-03-09 | Stop reason: HOSPADM

## 2019-03-08 RX ORDER — DEXMEDETOMIDINE HYDROCHLORIDE 4 UG/ML
INJECTION, SOLUTION INTRAVENOUS AS NEEDED
Status: DISCONTINUED | OUTPATIENT
Start: 2019-03-08 | End: 2019-03-08 | Stop reason: HOSPADM

## 2019-03-08 RX ORDER — KETAMINE HYDROCHLORIDE 10 MG/ML
INJECTION, SOLUTION INTRAMUSCULAR; INTRAVENOUS AS NEEDED
Status: DISCONTINUED | OUTPATIENT
Start: 2019-03-08 | End: 2019-03-08 | Stop reason: HOSPADM

## 2019-03-08 RX ORDER — ASPIRIN 81 MG/1
81 TABLET ORAL DAILY
Status: DISCONTINUED | OUTPATIENT
Start: 2019-03-09 | End: 2019-03-09 | Stop reason: HOSPADM

## 2019-03-08 RX ORDER — SODIUM CHLORIDE 9 MG/ML
INJECTION, SOLUTION INTRAVENOUS
Status: DISCONTINUED | OUTPATIENT
Start: 2019-03-08 | End: 2019-03-08 | Stop reason: HOSPADM

## 2019-03-08 RX ORDER — POTASSIUM CHLORIDE 750 MG/1
10 TABLET, FILM COATED, EXTENDED RELEASE ORAL 2 TIMES DAILY
Status: DISCONTINUED | OUTPATIENT
Start: 2019-03-08 | End: 2019-03-09 | Stop reason: HOSPADM

## 2019-03-08 RX ORDER — ACETAMINOPHEN 325 MG/1
650 TABLET ORAL
Status: DISCONTINUED | OUTPATIENT
Start: 2019-03-08 | End: 2019-03-09 | Stop reason: HOSPADM

## 2019-03-08 RX ORDER — AMLODIPINE BESYLATE 5 MG/1
5 TABLET ORAL DAILY
Status: DISCONTINUED | OUTPATIENT
Start: 2019-03-09 | End: 2019-03-09 | Stop reason: HOSPADM

## 2019-03-08 RX ORDER — CARVEDILOL 12.5 MG/1
25 TABLET ORAL 2 TIMES DAILY WITH MEALS
Status: DISCONTINUED | OUTPATIENT
Start: 2019-03-08 | End: 2019-03-09 | Stop reason: HOSPADM

## 2019-03-08 RX ORDER — SODIUM CHLORIDE 0.9 % (FLUSH) 0.9 %
5-40 SYRINGE (ML) INJECTION AS NEEDED
Status: DISCONTINUED | OUTPATIENT
Start: 2019-03-08 | End: 2019-03-09 | Stop reason: HOSPADM

## 2019-03-08 RX ORDER — PROPOFOL 10 MG/ML
INJECTION, EMULSION INTRAVENOUS
Status: DISCONTINUED | OUTPATIENT
Start: 2019-03-08 | End: 2019-03-08 | Stop reason: HOSPADM

## 2019-03-08 RX ORDER — CEFAZOLIN SODIUM 1 G/3ML
INJECTION, POWDER, FOR SOLUTION INTRAMUSCULAR; INTRAVENOUS AS NEEDED
Status: DISCONTINUED | OUTPATIENT
Start: 2019-03-08 | End: 2019-03-08 | Stop reason: HOSPADM

## 2019-03-08 RX ORDER — HEPARIN SODIUM 200 [USP'U]/100ML
INJECTION, SOLUTION INTRAVENOUS
Status: COMPLETED | OUTPATIENT
Start: 2019-03-08 | End: 2019-03-08

## 2019-03-08 RX ORDER — BACITRACIN 50000 [IU]/1
INJECTION, POWDER, FOR SOLUTION INTRAMUSCULAR AS NEEDED
Status: DISCONTINUED | OUTPATIENT
Start: 2019-03-08 | End: 2019-03-08 | Stop reason: HOSPADM

## 2019-03-08 RX ORDER — MIDAZOLAM HYDROCHLORIDE 1 MG/ML
INJECTION, SOLUTION INTRAMUSCULAR; INTRAVENOUS AS NEEDED
Status: DISCONTINUED | OUTPATIENT
Start: 2019-03-08 | End: 2019-03-08 | Stop reason: HOSPADM

## 2019-03-08 RX ORDER — GLYCOPYRROLATE 0.2 MG/ML
INJECTION INTRAMUSCULAR; INTRAVENOUS AS NEEDED
Status: DISCONTINUED | OUTPATIENT
Start: 2019-03-08 | End: 2019-03-08 | Stop reason: HOSPADM

## 2019-03-08 RX ADMIN — DEXMEDETOMIDINE HYDROCHLORIDE 10 MCG: 4 INJECTION, SOLUTION INTRAVENOUS at 08:28

## 2019-03-08 RX ADMIN — KETAMINE HYDROCHLORIDE 10 MG: 10 INJECTION, SOLUTION INTRAMUSCULAR; INTRAVENOUS at 08:57

## 2019-03-08 RX ADMIN — Medication 120 MCG: at 08:48

## 2019-03-08 RX ADMIN — DEXMEDETOMIDINE HYDROCHLORIDE 10 MCG: 4 INJECTION, SOLUTION INTRAVENOUS at 08:31

## 2019-03-08 RX ADMIN — POTASSIUM CHLORIDE 10 MEQ: 750 TABLET, FILM COATED, EXTENDED RELEASE ORAL at 16:25

## 2019-03-08 RX ADMIN — Medication 200 MCG: at 08:51

## 2019-03-08 RX ADMIN — ACETAMINOPHEN 650 MG: 325 TABLET ORAL at 20:21

## 2019-03-08 RX ADMIN — Medication 200 MCG: at 08:58

## 2019-03-08 RX ADMIN — PROPOFOL 50 MCG/KG/MIN: 10 INJECTION, EMULSION INTRAVENOUS at 08:07

## 2019-03-08 RX ADMIN — FENTANYL CITRATE 25 MCG: 50 INJECTION, SOLUTION INTRAMUSCULAR; INTRAVENOUS at 08:28

## 2019-03-08 RX ADMIN — DEXMEDETOMIDINE HYDROCHLORIDE 10 MCG: 4 INJECTION, SOLUTION INTRAVENOUS at 08:20

## 2019-03-08 RX ADMIN — KETAMINE HYDROCHLORIDE 10 MG: 10 INJECTION, SOLUTION INTRAMUSCULAR; INTRAVENOUS at 08:23

## 2019-03-08 RX ADMIN — SODIUM CHLORIDE: 9 INJECTION, SOLUTION INTRAVENOUS at 08:00

## 2019-03-08 RX ADMIN — ACETAMINOPHEN 650 MG: 325 TABLET ORAL at 13:50

## 2019-03-08 RX ADMIN — GLYCOPYRROLATE 0.2 MG: 0.2 INJECTION INTRAMUSCULAR; INTRAVENOUS at 08:48

## 2019-03-08 RX ADMIN — Medication 80 MCG: at 08:45

## 2019-03-08 RX ADMIN — FENTANYL CITRATE 50 MCG: 50 INJECTION, SOLUTION INTRAMUSCULAR; INTRAVENOUS at 08:30

## 2019-03-08 RX ADMIN — CARVEDILOL 25 MG: 12.5 TABLET, FILM COATED ORAL at 16:25

## 2019-03-08 RX ADMIN — Medication 200 MCG: at 09:02

## 2019-03-08 RX ADMIN — ONDANSETRON 4 MG: 2 INJECTION INTRAMUSCULAR; INTRAVENOUS at 08:49

## 2019-03-08 RX ADMIN — ATORVASTATIN CALCIUM 10 MG: 10 TABLET, FILM COATED ORAL at 22:37

## 2019-03-08 RX ADMIN — MIDAZOLAM HYDROCHLORIDE 1 MG: 1 INJECTION, SOLUTION INTRAMUSCULAR; INTRAVENOUS at 08:04

## 2019-03-08 RX ADMIN — FENTANYL CITRATE 25 MCG: 50 INJECTION, SOLUTION INTRAMUSCULAR; INTRAVENOUS at 08:11

## 2019-03-08 RX ADMIN — MIDAZOLAM HYDROCHLORIDE 1 MG: 1 INJECTION, SOLUTION INTRAMUSCULAR; INTRAVENOUS at 08:01

## 2019-03-08 RX ADMIN — KETAMINE HYDROCHLORIDE 10 MG: 10 INJECTION, SOLUTION INTRAMUSCULAR; INTRAVENOUS at 08:09

## 2019-03-08 RX ADMIN — KETAMINE HYDROCHLORIDE 10 MG: 10 INJECTION, SOLUTION INTRAMUSCULAR; INTRAVENOUS at 08:30

## 2019-03-08 RX ADMIN — SACUBITRIL AND VALSARTAN 1 TABLET: 24; 26 TABLET, FILM COATED ORAL at 16:25

## 2019-03-08 RX ADMIN — CEFAZOLIN SODIUM 2 G: 1 INJECTION, POWDER, FOR SOLUTION INTRAMUSCULAR; INTRAVENOUS at 08:19

## 2019-03-08 RX ADMIN — Medication 10 ML: at 22:37

## 2019-03-08 RX ADMIN — Medication 200 MCG: at 09:08

## 2019-03-08 NOTE — Clinical Note
Dressed using non-adherent and transparent dressing. Site: clean, dry, & intact, no bleeding and no hematoma.

## 2019-03-08 NOTE — ANESTHESIA POSTPROCEDURE EVALUATION
Procedure(s):  INSERT ICD BIV MULTI. Anesthesia Post Evaluation        Patient location during evaluation: PACU  Note status: Adequate. Level of consciousness: responsive to verbal stimuli and sleepy but conscious  Pain management: satisfactory to patient  Airway patency: patent  Anesthetic complications: no  Cardiovascular status: acceptable  Respiratory status: acceptable  Hydration status: acceptable  Comments: +Post-Anesthesia Evaluation and Assessment    Patient: Sis Bautista MRN: [id number]  SSN: [id number]   YOB: 1950  Age: 76 y.o. Sex: male      Cardiovascular Function/Vital Signs    BP (!) 130/94   Pulse 60   Temp 36.4 °C (97.6 °F)   Resp 17   Ht 5' 9\" (1.753 m)   Wt 95.3 kg (210 lb)   SpO2 95%   BMI 31.01 kg/m²     Patient is status post Procedure(s):  INSERT ICD BIV MULTI. Nausea/Vomiting: Controlled. Postoperative hydration reviewed and adequate. Pain:  Pain Scale 1: Numeric (0 - 10) (03/08/19 1145)  Pain Intensity 1: 0 (03/08/19 1145)   Managed. Neurological Status: At baseline. Mental Status and Level of Consciousness: Arousable. Pulmonary Status:   O2 Device: Room air (03/08/19 1145)   Adequate oxygenation and airway patent. Complications related to anesthesia: None    Post-anesthesia assessment completed. No concerns.     Signed By: Mamta Wray MD    3/8/2019  Post anesthesia nausea and vomiting:  controlled      Visit Vitals  BP (!) 130/94   Pulse 60   Temp 36.4 °C (97.6 °F)   Resp 17   Ht 5' 9\" (1.753 m)   Wt 95.3 kg (210 lb)   SpO2 95%   BMI 31.01 kg/m²

## 2019-03-08 NOTE — Clinical Note
TRANSFER - OUT REPORT:  
 
Verbal report given to: recovery RN. Report consisted of patient's Situation, Background, Assessment and  
Recommendations(SBAR). Opportunity for questions and clarification was provided. Patient transported with a Registered Nurse. Patient transported to: recovery .

## 2019-03-08 NOTE — PROGRESS NOTES
Cardiac Cath Lab Recovery Arrival Note:      Megan Kunz arrived to Cardiac Cath Lab, Recovery Area. Staff introduced to patient. Patient identifiers verified with NAME and DATE OF BIRTH. Procedure verified with patient. Consent forms reviewed and signed by patient or authorized representative and verified. Allergies verified. Patient and family oriented to department. Patient and family informed of procedure and plan of care. Questions answered with review. Patient prepped for procedure, per orders from physician, prior to arrival.    Patient on cardiac monitor, non-invasive blood pressure, SPO2 monitor. On room air. Patient is A&Ox 3. Patient reports no c/o. Patient in stretcher, in low position, with side rails up, call bell within reach, patient instructed to call if assistance as needed. Patient prep in: [address], [place] [address]. Patient family has pager # none  Family in: [address] [facility] waiting area.    Prep by: Chantelle Lane, RN and Bakari Mcclain RN

## 2019-03-08 NOTE — PROGRESS NOTES
TRANSFER - IN REPORT:    Verbal report received from Sonja Turpin CRNA on Noemi Bacon  being received from EP for routine progression of care. Report consisted of patients Situation, Background, Assessment and Recommendations(SBAR). Information from the following report(s) Procedure Summary and MAR was reviewed with the receiving clinician. Opportunity for questions and clarification was provided. Assessment completed upon patients arrival to [address] [place] [address] and care assumed. Cardiac Cath Lab Recovery Arrival Note:    Noemi Bacon arrived to [facility] recovery area. Patient procedure= BiVICD. Patient on cardiac monitor, non-invasive blood pressure, SPO2 monitor. On O2 @ 4 lpm via supernova. Patient status doing well without problems. Patient is A&Ox 3. Patient reports no c/o. PROCEDURE SITE CHECK:    Procedure site:without any bleeding and no hematoma, no pain/discomfort reported at procedure site. No change in patient status. Continue to monitor patient and status.

## 2019-03-08 NOTE — Clinical Note
TRANSFER - IN REPORT:  
 
Verbal report received from: recovery RN. Report consisted of patient's Situation, Background, Assessment and  
Recommendations(SBAR). Opportunity for questions and clarification was provided. Assessment completed upon patient's arrival to unit and care assumed. Patient transported with a Registered Nurse.

## 2019-03-08 NOTE — PROGRESS NOTES
Call from radiology to report patient had small pneumothorax. Dr. Ok Nguyen looked at film and said that patient will need to stay as observation overnight and he is able to eat and drink.

## 2019-03-08 NOTE — INTERVAL H&P NOTE
H&P Update:  Lucia Echeverria was seen and examined. History and physical has been reviewed. The patient has been examined.  There have been no significant clinical changes since the completion of the originally dated History and Physical.    Signed By: Monet Olvera MD     March 8, 2019 8:19 AM

## 2019-03-08 NOTE — ANESTHESIA PREPROCEDURE EVALUATION
Anesthetic History   No history of anesthetic complications            Review of Systems / Medical History  Patient summary reviewed, nursing notes reviewed and pertinent labs reviewed    Pulmonary          Smoker         Neuro/Psych   Within defined limits           Cardiovascular    Hypertension      CHF    CAD, CABG (2005) and hyperlipidemia    Exercise tolerance: >4 METS  Comments: Nonischemic cardiomyopathy     Ejection fraction was estimated to be 20 % in the  range of 15 % to 20 %.       GI/Hepatic/Renal  Within defined limits              Endo/Other    Diabetes: type 2         Other Findings   Comments: Gout    thrombocytopenia         Physical Exam    Airway  Mallampati: II  TM Distance: 4 - 6 cm  Neck ROM: normal range of motion   Mouth opening: Normal     Cardiovascular  Regular rate and rhythm,  S1 and S2 normal,  no murmur, click, rub, or gallop             Dental    Dentition: Caps/crowns     Pulmonary  Breath sounds clear to auscultation               Abdominal  GI exam deferred       Other Findings            Anesthetic Plan    ASA: 3  Anesthesia type: total IV anesthesia and MAC          Induction: Intravenous  Anesthetic plan and risks discussed with: Patient

## 2019-03-08 NOTE — DISCHARGE INSTRUCTIONS
[address]  [phone]        ICD INSERTION DISCHARGE INSTRUCTIONS    Patient ID:  Lucia Echeverria  [id number]  76 y.o.  1950    Admit Date: 3/8/2019    Discharge Date: 3/8/2019     Admitting Physician: Monet Olvera MD     Discharge Physician: Monet Olvera MD    Admission Diagnoses:   Cardiomyopathy, unspecified type [facility]) [I42.9]    Discharge Diagnoses: Active Problems:    * No active hospital problems. *      Discharge Condition: Good    Cardiology Procedures this Admission:  S/P ICD implantation. Disposition: home    Reference discharge instructions provided by nursing for diet and activity. Follow-up with ICD/PM clinic in 2 weeks. Call [phone] to make an appointment. Signed:  CARLITA Diane  3/8/2019  8:39 AM      DISCHARGE INSTRUCTIONS FOR PATIENTS WITH ICD'S    1. Remember to call for an appointment in 2-4 weeks [phone]. 2. Medic Alert Bracelets are available from your pharmacist to wear at all times if you choose to wear one. 3. Carry your ID card for your ICD with you at all times. This card will be given to you in the hospital or mailed to you. 4. The ICD will bulge slightly under your skin. The bulge will decrease in size over the next few weeks. Please notify the doctor's office if you notice any of the following around your ICD site:   A.  A bruise that does not go away. B.  Soreness or yellow, green, or brown drainage from the site. C. Any swelling from the site. D. If you have a fever of 100 degrees or higher that lasts for a few days. INCISION CARE       1.  Leave the dressing over your site until your follow up visit. 2.  You may not shower until after follow up visit. 3.  For comfort, wear loose fitting clothing. 4.  Ice pack to affected shoulder for first 24 hours, wear your sling for 2 days.   5.  Report any signs of infection, fever, pain, swelling, redness, oozing, or heat at site especially if these symptoms increase after the first 3 to 4 days. ACTIVITY PRECAUTIONS     1. Avoid rough contact with the implant site. 2. No driving for 14 days. 3. Avoid lifting your arm over your head, carrying anything on the affected side, or lifting over 10 pounds for 30 days. For the first 2 days only bend your arm at the elbow. 4. Any extreme activity such as golf, weight lifting or exercise biking should be restricted for 60 days. 5. Do not carry objects by holding them against your implant site. 6.  No shooting rifles or any type of gun with the affected shoulder permanently. 7.  Welding and chainsaws are prohibited. SPECIAL PRECAUTIONS     1. You should avoid all strong magnetic fields, such as arc welding, large transformers, large motors. Some ICD devices will beep if it detects a strong magnet. If this occurs, move out of the area. 2.  You may or may not have an MRI which uses a strong magnet to take pictures. 3.  Treatments or surgery that requires diathermy or electrocautery should be discussed with your doctor before scheduled. 4.  Avoid radio frequency transmitters, including radar. 5.  Advise dentist or other medical personnel you see that you have an ICD. 6.  Cell phones and microwave oven use is okay. 7.  If you plan to move or take a trip to a new area, the doctor's office will give you a name of a doctor to contact for any problems. SPECIAL INSTRUCTIONS ON SHOCKS     1. Notify your doctor for any of the following:       A. Anytime a shock is received in a 24 hour period. An office visit is not usually required for a single shock. B.  Two or more shocks in a row. If you do not feel well, call the [facility], otherwise call your doctor. This may require an office visit. C. Two or more shocks spaced apart by several hours. This may require an office visit. 2.  Keep a record of events. Include date, time, symptoms and activity at that time.         ANTIBIOTIC THERAPY    During the first 8 weeks after your ICD insertion, you may need antibiotics before any dental work or certain tests or operations. Let the dentist or doctor who is caring for you know that you have had an implanted device.

## 2019-03-09 ENCOUNTER — APPOINTMENT (OUTPATIENT)
Dept: GENERAL RADIOLOGY | Age: 69
End: 2019-03-09
Attending: INTERNAL MEDICINE
Payer: MEDICARE

## 2019-03-09 VITALS
HEART RATE: 71 BPM | SYSTOLIC BLOOD PRESSURE: 137 MMHG | DIASTOLIC BLOOD PRESSURE: 95 MMHG | OXYGEN SATURATION: 97 % | WEIGHT: 210 LBS | TEMPERATURE: 99 F | RESPIRATION RATE: 16 BRPM | HEIGHT: 69 IN | BODY MASS INDEX: 31.1 KG/M2

## 2019-03-09 PROCEDURE — 74011250637 HC RX REV CODE- 250/637: Performed by: INTERNAL MEDICINE

## 2019-03-09 PROCEDURE — 93005 ELECTROCARDIOGRAM TRACING: CPT

## 2019-03-09 PROCEDURE — 71046 X-RAY EXAM CHEST 2 VIEWS: CPT

## 2019-03-09 RX ADMIN — FUROSEMIDE 20 MG: 20 TABLET ORAL at 09:14

## 2019-03-09 RX ADMIN — POTASSIUM CHLORIDE 10 MEQ: 750 TABLET, FILM COATED, EXTENDED RELEASE ORAL at 09:14

## 2019-03-09 RX ADMIN — CARVEDILOL 25 MG: 12.5 TABLET, FILM COATED ORAL at 09:14

## 2019-03-09 RX ADMIN — ASPIRIN 81 MG: 81 TABLET ORAL at 09:14

## 2019-03-09 RX ADMIN — AMLODIPINE BESYLATE 5 MG: 5 TABLET ORAL at 09:14

## 2019-03-09 RX ADMIN — SACUBITRIL AND VALSARTAN 1 TABLET: 24; 26 TABLET, FILM COATED ORAL at 09:14

## 2019-03-09 NOTE — PROGRESS NOTES
Cardiology Progress Note      3/9/2019 9:18 AM    Admit Date: 3/8/2019    Admit Diagnosis: Cardiomyopathy, unspecified type ([facility] [I42.9]; Cardiomyopathy ([facility] [I42.9]      Subjective:     Pippa Huynh is s/p BIV ICD yesterday. Post procedure noted to have pneumothorax. He denies any chest pain, SOB. Visit Vitals  BP (!) 140/107   Pulse 73   Temp 99.2 °F (37.3 °C)   Resp 16   Ht 5' 9\" (1.753 m)   Wt 210 lb (95.3 kg)   SpO2 95%   BMI 31.01 kg/m²       Current Facility-Administered Medications   Medication Dose Route Frequency    sodium chloride (NS) flush 5-40 mL  5-40 mL IntraVENous Q8H    sodium chloride (NS) flush 5-40 mL  5-40 mL IntraVENous PRN    naloxone (NARCAN) injection 0.4 mg  0.4 mg IntraVENous PRN    acetaminophen (TYLENOL) tablet 650 mg  650 mg Oral Q4H PRN    HYDROcodone-acetaminophen (NORCO) 5-325 mg per tablet 1 Tab  1 Tab Oral Q4H PRN    amLODIPine (NORVASC) tablet 5 mg  5 mg Oral DAILY    aspirin delayed-release tablet 81 mg  81 mg Oral DAILY    atorvastatin (LIPITOR) tablet 10 mg  10 mg Oral QHS    carvedilol (COREG) tablet 25 mg  25 mg Oral BID WITH MEALS    furosemide (LASIX) tablet 20 mg  20 mg Oral DAILY    potassium chloride SR (KLOR-CON 10) tablet 10 mEq  10 mEq Oral BID    sacubitril-valsartan (ENTRESTO) 24-26 mg tablet 1 Tab  1 Tab Oral BID         Objective:      Physical Exam:  Visit Vitals  BP (!) 140/107   Pulse 73   Temp 99.2 °F (37.3 °C)   Resp 16   Ht 5' 9\" (1.753 m)   Wt 210 lb (95.3 kg)   SpO2 95%   BMI 31.01 kg/m²     General Appearance:  Well developed, well nourished,alert and oriented x 3, and individual in no acute distress. Ears/Nose/Mouth/Throat:   Hearing grossly normal.         Neck: Supple. Chest:   Lungs clear to auscultation bilaterally. Cardiovascular:  Regular rate and rhythm, S1, S2 normal, no murmur. Abdomen:   Soft, non-tender, bowel sounds are active. Extremities: No edema bilaterally. Skin: Warm and dry.                Data Review: Labs:    Recent Results (from the past 24 hour(s))   GLUCOSE, POC    Collection Time: 03/08/19  1:52 PM   Result Value Ref Range    Glucose (POC) 263 (H) 65 - 100 mg/dL    Performed by Angella Abbasi [address], POC    Collection Time: 03/08/19  5:54 PM   Result Value Ref Range    Glucose (POC) 251 (H) 65 - 100 mg/dL    Performed by JANELL PARKS        Telemetry: normal sinus rhythm      Assessment:     Active Problems:    Cardiomyopathy ([address] (3/8/2019)        Plan:     Order repeat chest xray. If no significant change, can go home later today.     aNdia Carcamo MD

## 2019-03-09 NOTE — PROGRESS NOTES
Small pneumothorax unchanged per radiology. Patient asymptomatic.  Will d/c home, f/u next week with Dr. Bobbi Davis.

## 2019-03-09 NOTE — PROGRESS NOTES
Bedside shift change report given to Emeli (oncoming nurse) by Judit Hill (offgoing nurse). Report included the following information SBAR, Kardex, Intake/Output, MAR, Accordion and Recent Results.

## 2019-03-11 LAB
ATRIAL RATE: 71 BPM
CALCULATED P AXIS, ECG09: 36 DEGREES
CALCULATED R AXIS, ECG10: 164 DEGREES
CALCULATED T AXIS, ECG11: -35 DEGREES
DIAGNOSIS, 93000: NORMAL
P-R INTERVAL, ECG05: 146 MS
Q-T INTERVAL, ECG07: 440 MS
QRS DURATION, ECG06: 132 MS
QTC CALCULATION (BEZET), ECG08: 478 MS
VENTRICULAR RATE, ECG03: 71 BPM

## 2019-03-19 ENCOUNTER — CLINICAL SUPPORT (OUTPATIENT)
Dept: CARDIOLOGY CLINIC | Age: 69
End: 2019-03-19

## 2019-03-19 DIAGNOSIS — I50.22 SYSTOLIC CHF, CHRONIC (HCC): ICD-10-CM

## 2019-03-19 DIAGNOSIS — I42.9 CARDIOMYOPATHY, UNSPECIFIED TYPE (HCC): ICD-10-CM

## 2019-03-19 DIAGNOSIS — Z95.810 PRESENCE OF AUTOMATIC CARDIOVERTER/DEFIBRILLATOR (AICD): Primary | ICD-10-CM

## 2019-04-04 ENCOUNTER — CLINICAL SUPPORT (OUTPATIENT)
Dept: CARDIOLOGY CLINIC | Age: 69
End: 2019-04-04

## 2019-04-04 DIAGNOSIS — Z95.810 PRE-OPERATIVE CARDIOVASCULAR EXAMINATION, ICD IN PLACE: Primary | ICD-10-CM

## 2019-04-04 DIAGNOSIS — Z51.89 VISIT FOR WOUND CHECK: ICD-10-CM

## 2019-04-04 DIAGNOSIS — Z95.810 ICD (IMPLANTABLE CARDIOVERTER-DEFIBRILLATOR) IN PLACE: Primary | ICD-10-CM

## 2019-04-04 DIAGNOSIS — I50.22 SYSTOLIC CHF, CHRONIC (HCC): ICD-10-CM

## 2019-04-04 DIAGNOSIS — Z01.810 PRE-OPERATIVE CARDIOVASCULAR EXAMINATION, ICD IN PLACE: Primary | ICD-10-CM

## 2019-04-04 NOTE — PROGRESS NOTES
Fabye Harada  1950  Lesley Judd MD          Subjective:    Patient is here for 2 week site check and device interrogation after BIV ICD implantation. The patient denies chest pain/shortness of breath or fevers. Patient Active Problem List    Diagnosis Date Noted    Cardiomyopathy [facility]) 03/08/2019     Priority: 1 - One    S/P cardiac cath 02/08/2019    Nonischemic cardiomyopathy ([facility] 02/08/2019    Cardiomyopathy, ischemic 02/08/2019    Syncope 12/21/2018    ASHD (arteriosclerotic heart disease) 75/89/5462    Systolic CHF, chronic ([facility] 09/17/2013    Essential hypertension, benign 09/17/2013    Mixed hyperlipidemia 09/17/2013      Past Medical History:   Diagnosis Date    CAD (coronary artery disease)     Chronic systolic HF (heart failure) [facility]     DM type 2 (diabetes mellitus, type 2) (HCC)     Gout     HTN (hypertension)     Hypercholesterolemia     Nonischemic cardiomyopathy ([facility] 2/8/2019    S/P cardiac cath 2/8/2019 2/8/19 cardiac cath with nonobstructive disease      Past Surgical History:   Procedure Laterality Date    CARDIAC SURG PROCEDURE UNLIST      HX CORONARY ARTERY BYPASS GRAFT  2005    WI INSJ/RPLCMT PERM DFB W/TRNSVNS LDS 1/DUAL CHMBR N/A 3/8/2019    INSERT ICD BIV MULTI performed by Raul Caceres MD at \[facility]"" CARDIAC CATH LAB     No Known Allergies   Family History   Problem Relation Age of Onset    Heart Disease Father     Heart Attack Father     Hypertension Brother       Current Outpatient Medications   Medication Sig    atorvastatin (LIPITOR) 10 mg tablet TAKE 1 TABLET EVERY DAY    ACCU-CHEK DAVE PLUS METER [facility] USE TO TEST BLOOD SUGAR    sacubitril-valsartan (ENTRESTO) 24 mg/26 mg tablet Take 1 Tab by mouth two (2) times a day.  JANUMET 50-1,000 mg per tablet Take 1 Tab by mouth two (2) times daily (with meals).  multivitamin (ONE A DAY) tablet Take 1 Tab by mouth daily.  aspirin delayed-release 81 mg tablet Take 81 mg by mouth daily.  carvedilol (COREG) 25 mg tablet Take 25 mg by mouth two (2) times daily (with meals).  potassium chloride (KLOR-CON M10) 10 mEq tablet Take 10 mEq by mouth two (2) times a day.  furosemide (LASIX) 20 mg tablet Take 20 mg by mouth daily.  amLODIPine (NORVASC) 5 mg tablet Take 5 mg by mouth daily. No current facility-administered medications for this visit. Review of Systems:    General: Pt denies excessive weight gain or loss. Pt is able to conduct ADL's  Respiratory: Denies shortness of breath, TO, wheezing or stridor. Cardiovascular: Denies precordial pain, palpitations, edema or PND        Physical ExamPhysical Exam:      General: Well developed, in no acute distress. .  Chest: left subclavian pacer site approximated well  Neuro: A&Ox3, speech clear, gait stable. Assessment:   Assessment:     ICD-10-CM ICD-9-CM    1. ICD (implantable cardioverter-defibrillator) in place Z95.810 V45.02    2. Visit for wound check Z51.89 V58.89         Plan:     Patient feels well following BIV ICD implantation. Left subclavian ICD site approximated well, no discharge. No erythema or heat. Device interrogation demonstrates normal functioning. He is LV paced only 96%. 82.4K PVCs since his procedure. He denies cardiac complaints. Will re-assess PVC count and LVP burden at follow up. Follow up as planned.      Dilia Rich NP

## 2019-04-16 ENCOUNTER — DOCUMENTATION ONLY (OUTPATIENT)
Dept: CARDIOLOGY CLINIC | Age: 69
End: 2019-04-16

## 2019-05-28 RX ORDER — SACUBITRIL AND VALSARTAN 24; 26 MG/1; MG/1
TABLET, FILM COATED ORAL
Qty: 60 TAB | Refills: 1 | Status: SHIPPED | OUTPATIENT
Start: 2019-05-28 | End: 2022-04-12 | Stop reason: DRUGHIGH

## 2019-07-11 ENCOUNTER — OFFICE VISIT (OUTPATIENT)
Dept: CARDIOLOGY CLINIC | Age: 69
End: 2019-07-11

## 2019-07-11 ENCOUNTER — CLINICAL SUPPORT (OUTPATIENT)
Dept: CARDIOLOGY CLINIC | Age: 69
End: 2019-07-11

## 2019-07-11 VITALS
OXYGEN SATURATION: 97 % | SYSTOLIC BLOOD PRESSURE: 108 MMHG | HEART RATE: 67 BPM | BODY MASS INDEX: 32.7 KG/M2 | WEIGHT: 220.8 LBS | HEIGHT: 69 IN | DIASTOLIC BLOOD PRESSURE: 80 MMHG | RESPIRATION RATE: 18 BRPM

## 2019-07-11 DIAGNOSIS — I42.9 CARDIOMYOPATHY, UNSPECIFIED TYPE (HCC): ICD-10-CM

## 2019-07-11 DIAGNOSIS — I50.22 SYSTOLIC CHF, CHRONIC (HCC): ICD-10-CM

## 2019-07-11 DIAGNOSIS — Z95.810 BIVENTRICULAR ICD (IMPLANTABLE CARDIOVERTER-DEFIBRILLATOR) IN PLACE: ICD-10-CM

## 2019-07-11 DIAGNOSIS — I10 ESSENTIAL HYPERTENSION, BENIGN: ICD-10-CM

## 2019-07-11 DIAGNOSIS — I25.5 CARDIOMYOPATHY, ISCHEMIC: Primary | ICD-10-CM

## 2019-07-11 DIAGNOSIS — E78.2 MIXED HYPERLIPIDEMIA: ICD-10-CM

## 2019-07-11 DIAGNOSIS — I48.0 PAROXYSMAL ATRIAL FIBRILLATION (HCC): ICD-10-CM

## 2019-07-11 DIAGNOSIS — Z95.810 ICD (IMPLANTABLE CARDIOVERTER-DEFIBRILLATOR) IN PLACE: Primary | ICD-10-CM

## 2019-07-11 DIAGNOSIS — I25.10 ASHD (ARTERIOSCLEROTIC HEART DISEASE): ICD-10-CM

## 2019-07-11 RX ORDER — GLIMEPIRIDE 2 MG/1
2 TABLET ORAL
COMMUNITY
Start: 2019-06-02 | End: 2022-08-16

## 2019-07-11 RX ORDER — METFORMIN HYDROCHLORIDE 750 MG/1
750 TABLET, EXTENDED RELEASE ORAL 3 TIMES DAILY
COMMUNITY
Start: 2019-07-02 | End: 2022-01-11

## 2019-07-11 NOTE — PROGRESS NOTES
Reviewed record in preparation for visit and obtained necessary documentation. Verified patient with 2 identifiers   Chief Complaint   Patient presents with    Coronary Artery Disease     ICD implanted 3/8/19- denies cardiac sx      1. Have you been to the ER, urgent care clinic since your last visit? Hospitalized since your last visit? No     2. Have you seen or consulted any other health care providers outside of the [address] [place] [address] since your last visit? Include any pap smears or colon screening.   No

## 2019-07-11 NOTE — LETTER
7/11/19 Patient: Brandin Maldonado YOB: 1950 Date of Visit: 7/11/2019 Jordi Fong MD 
[address] [address] VIA Facsimile: [phone] Dear Jordi Fong MD, Thank you for referring Mr. Brandin Maldonado to [facility] for evaluation. My notes for this consultation are attached. If you have questions, please do not hesitate to call me. I look forward to following your patient along with you. Sincerely, Lul Saini MD

## 2019-07-11 NOTE — PROGRESS NOTES
Subjective:      Radha Flannery is a 76 y.o. male is here for follow up s/p BIV ICD implant 3/2019. He is doing well and denies cardiac complaints. The patient denies chest pain/ shortness of breath, orthopnea, PND, LE edema, palpitations, syncope, presyncope or fatigue.        Patient Active Problem List    Diagnosis Date Noted    Cardiomyopathy [facility]) 03/08/2019     Priority: 1 - One    S/P cardiac cath 02/08/2019    Nonischemic cardiomyopathy ([facility] 02/08/2019    Cardiomyopathy, ischemic 02/08/2019    Syncope 12/21/2018    ASHD (arteriosclerotic heart disease) 03/73/9563    Systolic CHF, chronic ([address] 09/17/2013    Essential hypertension, benign 09/17/2013    Mixed hyperlipidemia 09/17/2013      Keith Parr MD  Past Medical History:   Diagnosis Date    CAD (coronary artery disease)     Chronic systolic HF (heart failure) ([address]     DM type 2 (diabetes mellitus, type 2) ([facility]     Gout     HTN (hypertension)     Hypercholesterolemia     Nonischemic cardiomyopathy ([facility] [address] [facility] 2/8/2019    S/P cardiac cath 2/8/2019 2/8/19 cardiac cath with nonobstructive disease      Past Surgical History:   Procedure Laterality Date    CARDIAC SURG PROCEDURE UNLIST      HX CORONARY ARTERY BYPASS GRAFT  2005    [place] INSJ/RPLCMT PERM DFB W/TRNSVNS LDS 1/DUAL CHMBR N/A 3/8/2019    INSERT ICD BIV MULTI performed by Elia Lorenz MD at [facility] CARDIAC CATH LAB     No Known Allergies   Family History   Problem Relation Age of Onset    Heart Disease Father     Heart Attack Father     Hypertension Brother     negative for cardiac disease  Social History     Socioeconomic History    Marital status:      Spouse name: Not on file    Number of children: Not on file    Years of education: Not on file    Highest education level: Not on file   Tobacco Use    Smoking status: Current Every Day Smoker     Packs/day: 0.50     Years: 40.00     Pack years: 20.00     Types: Cigarettes    Smokeless tobacco: Never Used  Tobacco comment: 5 cigarettes a day   Substance and Sexual Activity    Alcohol use: Yes     Comment: occasionally every 2 weeks    Drug use: No     Current Outpatient Medications   Medication Sig    metFORMIN ER (GLUCOPHAGE XR) 750 mg tablet     glimepiride (AMARYL) 2 mg tablet     apixaban (ELIQUIS) 5 mg tablet Take 1 Tab by mouth two (2) times a day.  ENTRESTO 24-26 mg tablet TAKE 1 TABLET BY MOUTH TWICE A DAY    sacubitril-valsartan (ENTRESTO) 24 mg/26 mg tablet Take 1 Tab by mouth two (2) times a day.  atorvastatin (LIPITOR) 10 mg tablet TAKE 1 TABLET EVERY DAY    ACCU-CHEK DAVE PLUS METER misc USE TO TEST BLOOD SUGAR    multivitamin (ONE A DAY) tablet Take 1 Tab by mouth daily.  aspirin delayed-release 81 mg tablet Take 81 mg by mouth daily.  carvedilol (COREG) 25 mg tablet Take 25 mg by mouth two (2) times daily (with meals).  potassium chloride (KLOR-CON M10) 10 mEq tablet Take 10 mEq by mouth two (2) times a day.  furosemide (LASIX) 20 mg tablet Take 20 mg by mouth daily.  amLODIPine (NORVASC) 5 mg tablet Take 5 mg by mouth daily. No current facility-administered medications for this visit. Vitals:    07/11/19 0910   BP: 108/80   Pulse: 67   Resp: 18   SpO2: 97%   Weight: 220 lb 12.8 oz (100.2 kg)   Height: 5' 9\" (1.753 m)       I have reviewed the nurses notes, vitals, problem list, allergy list, medical history, family, social history and medications. Review of Symptoms:    General: Pt denies excessive weight gain or loss. Pt is able to conduct ADL's  HEENT: Denies blurred vision, headaches, epistaxis and difficulty swallowing. Respiratory: Denies shortness of breath, TO, wheezing or stridor.   Cardiovascular: Denies precordial pain, palpitations, edema or PND  Gastrointestinal: Denies poor appetite, indigestion, abdominal pain or blood in stool  Urinary: Denies dysuria, pyuria  Musculoskeletal: Denies pain or swelling from muscles or joints  Neurologic: Denies tremor, paresthesias, or sensory motor disturbance  Skin: Denies rash, itching or texture change. Psych: Denies depression      Physical Exam:      General: Well developed, in no acute distress. HEENT: Eyes - PERRL, no jvd  Heart:  Normal S1/S2 negative S3 or S4. Regular, no murmur, gallop or rub. Respiratory: Clear bilaterally x 4, no wheezing or rales  Abdomen:   Soft, non-tender, bowel sounds are active. Extremities:  No edema, normal cap refill, no cyanosis. Musculoskeletal: No clubbing  Neuro: A&Ox3, speech clear, gait stable. Skin: Skin color is normal. No rashes or lesions.  Non diaphoretic  Vascular: 2+ pulses symmetric in all extremities    Cardiographics    Ekg: v pacing    Results for orders placed or performed during the hospital encounter of 03/08/19   EKG, 12 LEAD, INITIAL   Result Value Ref Range    Ventricular Rate 71 BPM    Atrial Rate 71 BPM    P-R Interval 146 ms    QRS Duration 132 ms    Q-T Interval 440 ms    QTC Calculation (Bezet) 478 ms    Calculated P Axis 36 degrees    Calculated R Axis 164 degrees    Calculated T Axis -35 degrees    Diagnosis       Electronic ventricular pacemaker  When compared with ECG of 21-DEC-2018 11:39,  Electronic ventricular pacemaker has replaced Sinus rhythm  Confirmed by Avtar Boykin ([id number]) on 3/11/2019 9:23:53 AM           Lab Results   Component Value Date/Time    WBC 4.3 02/28/2019 03:44 PM    HGB 15.5 02/28/2019 03:44 PM    HCT 45.4 02/28/2019 03:44 PM    PLATELET 749 (L) 28/01/5948 03:44 PM    MCV 84 02/28/2019 03:44 PM      Lab Results   Component Value Date/Time    Sodium 145 (H) 02/28/2019 03:44 PM    Potassium 4.0 02/28/2019 03:44 PM    Chloride 104 02/28/2019 03:44 PM    CO2 24 02/28/2019 03:44 PM    Anion gap 7 12/23/2018 04:52 AM    Glucose 139 (H) 02/28/2019 03:44 PM    BUN 11 02/28/2019 03:44 PM    Creatinine 1.02 02/28/2019 03:44 PM    BUN/Creatinine ratio 11 02/28/2019 03:44 PM    GFR est AA 87 02/28/2019 03:44 PM    GFR est non-AA 75 02/28/2019 03:44 PM    Calcium 9.2 02/28/2019 03:44 PM    Bilirubin, total 0.3 02/28/2019 03:44 PM    AST (SGOT) 20 02/28/2019 03:44 PM    Alk. phosphatase 84 02/28/2019 03:44 PM    Protein, total 6.5 02/28/2019 03:44 PM    Albumin 4.1 02/28/2019 03:44 PM    Globulin 3.6 12/21/2018 11:48 AM    A-G Ratio 1.7 02/28/2019 03:44 PM    ALT (SGPT) 19 02/28/2019 03:44 PM         Assessment:     Assessment:        ICD-10-CM ICD-9-CM    1. Cardiomyopathy, ischemic I25.5 414.8 AMB POC EKG ROUTINE W/ 12 LEADS, INTER & REP   2. Biventricular ICD (implantable cardioverter-defibrillator) in place Z95.810 V45.02    3. Essential hypertension, benign I10 401.1    4. ASHD (arteriosclerotic heart disease) I25.10 414.00    5. Paroxysmal atrial fibrillation (HCC) I48.0 427.31    6. Systolic CHF, chronic (HCC) I50.22 428.22      428.0    7. Mixed hyperlipidemia E78.2 272.2      Orders Placed This Encounter    AMB POC EKG ROUTINE W/ 12 LEADS, INTER & REP     Order Specific Question:   Reason for Exam:     Answer:   routine    metFORMIN ER (GLUCOPHAGE XR) 750 mg tablet    glimepiride (AMARYL) 2 mg tablet    apixaban (ELIQUIS) 5 mg tablet     Sig: Take 1 Tab by mouth two (2) times a day. Dispense:  60 Tab     Refill:  3        Plan:   Mr. Xi Mao is here for follow up s/ BIV ICD 3/8/2019. He is doing well and denies cardiac complaints. EKG shows ventricular pacing and device interrogation demonstrates normal functioning with 18% AP, 8% RVP, 93% LVP. He has had two episodes of AF, longest lasting 9 hours, 26 minutes. Discussed initiation of [facility]. Will start Eliquis 5mg BID. Continue medical management for HTN, CAD, AF. Thank you for allowing me to participate in Dolores Isaac 's care. Tyra Jung MD    Patient seen and examined by me with nurse practitioner.   I personally performed all components of the history, physical, and medical decision making and agree with the assessment and plan with minor modifications as noted. hav ing PAF on ICD. Will start oac. Cont med rx for htn and cad.  F/u in one year    Camelia Titus MD, Mame Andres

## 2019-07-12 ENCOUNTER — TELEPHONE (OUTPATIENT)
Dept: CARDIOLOGY CLINIC | Age: 69
End: 2019-07-12

## 2019-07-12 NOTE — TELEPHONE ENCOUNTER
Pt needs eliquis sent to [facility] [address].     Thanks, London Monahan

## 2019-07-12 NOTE — TELEPHONE ENCOUNTER
Spoke with patient. Explained that [facility] mail order had already requested prescription be sent to them and that was done.   Patient verbalized understanding and confirmed okay with that plan

## 2019-07-22 ENCOUNTER — TELEPHONE (OUTPATIENT)
Dept: CARDIOLOGY CLINIC | Age: 69
End: 2019-07-22

## 2019-07-22 RX ORDER — WARFARIN SODIUM 5 MG/1
5 TABLET ORAL DAILY
Qty: 30 TAB | Refills: 1 | Status: SHIPPED | OUTPATIENT
Start: 2019-07-22 | End: 2019-09-12 | Stop reason: SDUPTHER

## 2019-07-22 RX ORDER — WARFARIN SODIUM 5 MG/1
5 TABLET ORAL DAILY
COMMUNITY
End: 2019-07-22 | Stop reason: SDUPTHER

## 2019-07-22 NOTE — TELEPHONE ENCOUNTER
Patient's wife called. Reports that they will need to switch to Coumadin because of cost of other OACs. I told her that I would let Diane know and she would call patient to get him started on Coumadin.

## 2019-07-22 NOTE — TELEPHONE ENCOUNTER
Spoke with  Brooklyn Alisha. Advised him since he has already had a preliminary INR drawn, I would send his Warfarin script to his local pharmacy to  today. Sent to Joe Nelson NP for approval. Scheduled an INR for next week. Thanks!

## 2019-07-29 ENCOUNTER — ANTI-COAG VISIT (OUTPATIENT)
Dept: CARDIOLOGY CLINIC | Age: 69
End: 2019-07-29

## 2019-07-29 DIAGNOSIS — Z79.01 LONG TERM (CURRENT) USE OF ANTICOAGULANTS: Primary | ICD-10-CM

## 2019-07-29 DIAGNOSIS — I48.0 PAROXYSMAL ATRIAL FIBRILLATION (HCC): ICD-10-CM

## 2019-07-29 LAB
INR BLD: 2.2 (ref 1–1.5)
PT POC: 26.1 SECONDS (ref 9.1–12)
VALID INTERNAL CONTROL?: YES

## 2019-08-02 ENCOUNTER — ANTI-COAG VISIT (OUTPATIENT)
Dept: CARDIOLOGY CLINIC | Age: 69
End: 2019-08-02

## 2019-08-02 DIAGNOSIS — I48.0 PAROXYSMAL ATRIAL FIBRILLATION (HCC): ICD-10-CM

## 2019-08-02 DIAGNOSIS — Z79.01 LONG TERM (CURRENT) USE OF ANTICOAGULANTS: Primary | ICD-10-CM

## 2019-08-02 LAB
INR BLD: 2.6 (ref 1–1.5)
PT POC: 31.6 SECONDS (ref 9.1–12)
VALID INTERNAL CONTROL?: YES

## 2019-08-09 ENCOUNTER — ANTI-COAG VISIT (OUTPATIENT)
Dept: CARDIOLOGY CLINIC | Age: 69
End: 2019-08-09

## 2019-08-09 DIAGNOSIS — I48.0 PAROXYSMAL ATRIAL FIBRILLATION (HCC): ICD-10-CM

## 2019-08-09 DIAGNOSIS — Z79.01 LONG TERM (CURRENT) USE OF ANTICOAGULANTS: Primary | ICD-10-CM

## 2019-08-09 LAB — INR, EXTERNAL: 3

## 2019-08-09 NOTE — PROGRESS NOTES
A full discussion of the nature of anticoagulants has been carried out. A benefit risk analysis has been presented to the patient, so that they understand the justification for choosing anticoagulation at this time. The need for frequent and regular monitoring, precise dosage adjustment and compliance is stressed. Side effects of potential bleeding are discussed. The patient should avoid any OTC items containing aspirin, naproxen or ibuprofen, and should avoid great swings in general diet. Avoid alcohol consumption. Advised to notify the office if antibiotic or steroid therapy is initiated. Call if any signs of abnormal bleeding are present. Next PT/INR test in one week on 08/16/2019.

## 2019-08-16 ENCOUNTER — ANTI-COAG VISIT (OUTPATIENT)
Dept: CARDIOLOGY CLINIC | Age: 69
End: 2019-08-16

## 2019-08-16 DIAGNOSIS — Z79.01 LONG TERM (CURRENT) USE OF ANTICOAGULANTS: Primary | ICD-10-CM

## 2019-08-16 DIAGNOSIS — I48.0 PAROXYSMAL ATRIAL FIBRILLATION (HCC): ICD-10-CM

## 2019-08-16 LAB
INR BLD: 1.6 (ref 1–1.5)
PT POC: 19.3 SECONDS (ref 9.1–12)
VALID INTERNAL CONTROL?: YES

## 2019-08-23 ENCOUNTER — ANTI-COAG VISIT (OUTPATIENT)
Dept: CARDIOLOGY CLINIC | Age: 69
End: 2019-08-23

## 2019-08-23 DIAGNOSIS — Z79.01 LONG TERM (CURRENT) USE OF ANTICOAGULANTS: Primary | ICD-10-CM

## 2019-08-23 LAB
INR BLD: 2.4
PT POC: 28.9 SECONDS
VALID INTERNAL CONTROL?: YES

## 2019-08-23 NOTE — PROGRESS NOTES
A full discussion of the nature of anticoagulants has been carried out. A benefit risk analysis has been presented to the patient, so that they understand the justification for choosing anticoagulation at this time. The need for frequent and regular monitoring, precise dosage adjustment and compliance is stressed. Side effects of potential bleeding are discussed. The patient should avoid any OTC items containing aspirin, naproxen or ibuprofen, and should avoid great swings in general diet. Avoid alcohol consumption. Advised to notify the office if antibiotic or steroid therapy is initiated. Call if any signs of abnormal bleeding are present. Next PT/INR test in one week.

## 2019-08-30 ENCOUNTER — ANTI-COAG VISIT (OUTPATIENT)
Dept: CARDIOLOGY CLINIC | Age: 69
End: 2019-08-30

## 2019-08-30 DIAGNOSIS — Z79.01 LONG TERM (CURRENT) USE OF ANTICOAGULANTS: Primary | ICD-10-CM

## 2019-08-30 LAB
INR BLD: 2.4
PT POC: 28.2 SECONDS
VALID INTERNAL CONTROL?: YES

## 2019-08-30 NOTE — PROGRESS NOTES
A full discussion of the nature of anticoagulants has been carried out. A benefit risk analysis has been presented to the patient, so that they understand the justification for choosing anticoagulation at this time. The need for frequent and regular monitoring, precise dosage adjustment and compliance is stressed. Side effects of potential bleeding are discussed. The patient should avoid any OTC items containing aspirin, naproxen or ibuprofen, and should avoid great swings in general diet. Avoid alcohol consumption. Advised to notify the office if antibiotic or steroid therapy is initiated. Call if any signs of abnormal bleeding are present. Next PT/INR test in two weeks.

## 2019-09-13 RX ORDER — WARFARIN SODIUM 5 MG/1
TABLET ORAL
Qty: 30 TAB | Refills: 1 | Status: SHIPPED | OUTPATIENT
Start: 2019-09-13 | End: 2019-10-08 | Stop reason: SDUPTHER

## 2019-09-16 ENCOUNTER — ANTI-COAG VISIT (OUTPATIENT)
Dept: CARDIOLOGY CLINIC | Age: 69
End: 2019-09-16

## 2019-09-16 DIAGNOSIS — I48.0 PAROXYSMAL ATRIAL FIBRILLATION (HCC): ICD-10-CM

## 2019-09-16 DIAGNOSIS — Z79.01 LONG TERM (CURRENT) USE OF ANTICOAGULANTS: Primary | ICD-10-CM

## 2019-09-16 LAB
INR BLD: 2.7 (ref 1–1.5)
PT POC: 32.8 SECONDS (ref 9.1–12)
VALID INTERNAL CONTROL?: YES

## 2019-10-08 RX ORDER — WARFARIN SODIUM 5 MG/1
TABLET ORAL
Qty: 30 TAB | Refills: 1 | Status: SHIPPED | OUTPATIENT
Start: 2019-10-08 | End: 2019-11-04 | Stop reason: SDUPTHER

## 2019-10-14 ENCOUNTER — ANTI-COAG VISIT (OUTPATIENT)
Dept: CARDIOLOGY CLINIC | Age: 69
End: 2019-10-14

## 2019-10-14 DIAGNOSIS — Z79.01 LONG TERM (CURRENT) USE OF ANTICOAGULANTS: Primary | ICD-10-CM

## 2019-10-14 DIAGNOSIS — I48.0 PAROXYSMAL ATRIAL FIBRILLATION (HCC): ICD-10-CM

## 2019-10-14 LAB
INR BLD: 2.2 (ref 1–1.5)
PT POC: 26.4 SECONDS (ref 9.1–12)
VALID INTERNAL CONTROL?: YES

## 2019-10-16 ENCOUNTER — OFFICE VISIT (OUTPATIENT)
Dept: CARDIOLOGY CLINIC | Age: 69
End: 2019-10-16

## 2019-10-16 DIAGNOSIS — I25.5 CARDIOMYOPATHY, ISCHEMIC: ICD-10-CM

## 2019-10-16 DIAGNOSIS — Z95.810 BIVENTRICULAR ICD (IMPLANTABLE CARDIOVERTER-DEFIBRILLATOR) IN PLACE: Primary | ICD-10-CM

## 2019-11-04 RX ORDER — WARFARIN SODIUM 5 MG/1
TABLET ORAL
Qty: 30 TAB | Refills: 1 | OUTPATIENT
Start: 2019-11-04

## 2019-11-05 RX ORDER — WARFARIN SODIUM 5 MG/1
TABLET ORAL
Qty: 34 TAB | Refills: 1 | Status: SHIPPED | OUTPATIENT
Start: 2019-11-05 | End: 2019-12-03 | Stop reason: SDUPTHER

## 2019-11-11 ENCOUNTER — ANTI-COAG VISIT (OUTPATIENT)
Dept: CARDIOLOGY CLINIC | Age: 69
End: 2019-11-11

## 2019-11-11 DIAGNOSIS — Z79.01 LONG TERM (CURRENT) USE OF ANTICOAGULANTS: Primary | ICD-10-CM

## 2019-11-11 DIAGNOSIS — I48.0 PAROXYSMAL ATRIAL FIBRILLATION (HCC): ICD-10-CM

## 2019-11-11 LAB
INR BLD: 3.1 (ref 1–1.5)
PT POC: 37.3 SECONDS (ref 9.1–12)
VALID INTERNAL CONTROL?: YES

## 2019-12-02 RX ORDER — ATORVASTATIN CALCIUM 10 MG/1
TABLET, FILM COATED ORAL
Qty: 90 TAB | Refills: 0 | Status: SHIPPED | OUTPATIENT
Start: 2019-12-02

## 2019-12-03 RX ORDER — WARFARIN SODIUM 5 MG/1
TABLET ORAL
Qty: 102 TAB | Refills: 1 | Status: SHIPPED | OUTPATIENT
Start: 2019-12-03 | End: 2020-06-08

## 2019-12-09 ENCOUNTER — ANTI-COAG VISIT (OUTPATIENT)
Dept: CARDIOLOGY CLINIC | Age: 69
End: 2019-12-09

## 2019-12-09 DIAGNOSIS — Z79.01 LONG TERM (CURRENT) USE OF ANTICOAGULANTS: Primary | ICD-10-CM

## 2019-12-09 DIAGNOSIS — I48.0 PAROXYSMAL ATRIAL FIBRILLATION (HCC): ICD-10-CM

## 2019-12-09 LAB
INR BLD: 3.3 (ref 1–1.5)
PT POC: 39 SECONDS (ref 9.1–12)
VALID INTERNAL CONTROL?: YES

## 2019-12-09 NOTE — PROGRESS NOTES
A full discussion of the nature of anticoagulants has been carried out. A benefit risk analysis has been presented to the patient, so that they understand the justification for choosing anticoagulation at this time. The need for frequent and regular monitoring, precise dosage adjustment and compliance is stressed. Side effects of potential bleeding are discussed. The patient should avoid any OTC items containing aspirin, naproxen or ibuprofen, and should avoid great swings in general diet. Avoid alcohol consumption. Advised to notify the office if antibiotic or steroid therapy is initiated. Call if any signs of abnormal bleeding are present. Next PT/INR test in 11 days.

## 2019-12-20 ENCOUNTER — ANTI-COAG VISIT (OUTPATIENT)
Dept: CARDIOLOGY CLINIC | Age: 69
End: 2019-12-20

## 2019-12-20 DIAGNOSIS — I48.0 PAROXYSMAL ATRIAL FIBRILLATION (HCC): ICD-10-CM

## 2019-12-20 DIAGNOSIS — Z79.01 LONG TERM (CURRENT) USE OF ANTICOAGULANTS: Primary | ICD-10-CM

## 2019-12-20 LAB
INR BLD: 2.4 (ref 1–1.5)
PT POC: 29.1 SECONDS (ref 9.1–12)
VALID INTERNAL CONTROL?: YES

## 2020-01-10 ENCOUNTER — ANTI-COAG VISIT (OUTPATIENT)
Dept: CARDIOLOGY CLINIC | Age: 70
End: 2020-01-10

## 2020-01-10 DIAGNOSIS — I48.0 PAROXYSMAL ATRIAL FIBRILLATION (HCC): ICD-10-CM

## 2020-01-10 DIAGNOSIS — Z79.01 LONG TERM (CURRENT) USE OF ANTICOAGULANTS: Primary | ICD-10-CM

## 2020-01-10 LAB
INR BLD: 2.6 (ref 1–1.5)
PT POC: 31.3 SECONDS (ref 9.1–12)
VALID INTERNAL CONTROL?: YES

## 2020-01-15 ENCOUNTER — OFFICE VISIT (OUTPATIENT)
Dept: CARDIOLOGY CLINIC | Age: 70
End: 2020-01-15

## 2020-01-15 DIAGNOSIS — Z95.810 BIVENTRICULAR ICD (IMPLANTABLE CARDIOVERTER-DEFIBRILLATOR) IN PLACE: Primary | ICD-10-CM

## 2020-01-15 DIAGNOSIS — I25.5 CARDIOMYOPATHY, ISCHEMIC: ICD-10-CM

## 2020-02-07 ENCOUNTER — ANTI-COAG VISIT (OUTPATIENT)
Dept: CARDIOLOGY CLINIC | Age: 70
End: 2020-02-07

## 2020-02-07 DIAGNOSIS — I48.0 PAROXYSMAL ATRIAL FIBRILLATION (HCC): ICD-10-CM

## 2020-02-07 DIAGNOSIS — Z79.01 LONG TERM (CURRENT) USE OF ANTICOAGULANTS: Primary | ICD-10-CM

## 2020-02-07 LAB
INR BLD: 2.4 (ref 1–1.5)
PT POC: 28.8 SECONDS (ref 9.1–12)
VALID INTERNAL CONTROL?: YES

## 2020-03-01 ENCOUNTER — HOSPITAL ENCOUNTER (OUTPATIENT)
Dept: CT IMAGING | Age: 70
Discharge: HOME | End: 2020-03-01
Attending: FAMILY MEDICINE
Payer: MEDICARE

## 2020-03-01 DIAGNOSIS — I50.22 CHRONIC SYSTOLIC (CONGESTIVE) HEART FAILURE (HCC): ICD-10-CM

## 2020-03-01 DIAGNOSIS — R53.1 RIGHT SIDED WEAKNESS: ICD-10-CM

## 2020-03-01 DIAGNOSIS — Z79.01 LONG TERM (CURRENT) USE OF ANTICOAGULANTS: ICD-10-CM

## 2020-03-01 DIAGNOSIS — F17.200 TOBACCO USE DISORDER: ICD-10-CM

## 2020-03-01 PROCEDURE — 70450 CT HEAD/BRAIN W/O DYE: CPT

## 2020-03-06 ENCOUNTER — ANTI-COAG VISIT (OUTPATIENT)
Dept: CARDIOLOGY CLINIC | Age: 70
End: 2020-03-06

## 2020-03-06 DIAGNOSIS — Z79.01 LONG TERM (CURRENT) USE OF ANTICOAGULANTS: Primary | ICD-10-CM

## 2020-03-06 DIAGNOSIS — I48.0 PAROXYSMAL ATRIAL FIBRILLATION (HCC): ICD-10-CM

## 2020-03-06 LAB
INR BLD: 2 (ref 1–1.5)
PT POC: 24.6 SECONDS (ref 9.1–12)
VALID INTERNAL CONTROL?: YES

## 2020-04-01 ENCOUNTER — HOSPITAL ENCOUNTER (OUTPATIENT)
Dept: CT IMAGING | Age: 70
Discharge: HOME | End: 2020-04-01
Attending: FAMILY MEDICINE
Payer: MEDICARE

## 2020-04-01 DIAGNOSIS — I69.351 HEMIPARESIS AFFECTING RIGHT SIDE AS LATE EFFECT OF CEREBROVASCULAR ACCIDENT (CVA) (HCC): ICD-10-CM

## 2020-04-01 LAB — CREAT BLD-MCNC: 1 MG/DL (ref 0.6–1.3)

## 2020-04-01 PROCEDURE — 74011636320 HC RX REV CODE- 636/320

## 2020-04-01 PROCEDURE — 82565 ASSAY OF CREATININE: CPT

## 2020-04-01 PROCEDURE — 70498 CT ANGIOGRAPHY NECK: CPT

## 2020-04-01 RX ORDER — SODIUM CHLORIDE 0.9 % (FLUSH) 0.9 %
10 SYRINGE (ML) INJECTION
Status: COMPLETED | OUTPATIENT
Start: 2020-04-01 | End: 2020-04-01

## 2020-04-01 RX ADMIN — Medication 10 ML: at 08:36

## 2020-04-01 RX ADMIN — IOPAMIDOL 100 ML: 755 INJECTION, SOLUTION INTRAVENOUS at 08:36

## 2020-04-03 ENCOUNTER — ANTI-COAG VISIT (OUTPATIENT)
Dept: CARDIOLOGY CLINIC | Age: 70
End: 2020-04-03

## 2020-04-03 DIAGNOSIS — I48.0 PAROXYSMAL ATRIAL FIBRILLATION (HCC): ICD-10-CM

## 2020-04-03 DIAGNOSIS — Z79.01 LONG TERM (CURRENT) USE OF ANTICOAGULANTS: Primary | ICD-10-CM

## 2020-04-03 LAB
INR BLD: 2.2 (ref 1–1.5)
PT POC: 26.3 SECONDS (ref 9.1–12)
VALID INTERNAL CONTROL?: YES

## 2020-04-15 ENCOUNTER — OFFICE VISIT (OUTPATIENT)
Dept: CARDIOLOGY CLINIC | Age: 70
End: 2020-04-15

## 2020-04-15 DIAGNOSIS — Z95.810 BIVENTRICULAR ICD (IMPLANTABLE CARDIOVERTER-DEFIBRILLATOR) IN PLACE: Primary | ICD-10-CM

## 2020-04-15 DIAGNOSIS — I25.5 CARDIOMYOPATHY, ISCHEMIC: ICD-10-CM

## 2020-05-01 ENCOUNTER — ANTI-COAG VISIT (OUTPATIENT)
Dept: CARDIOLOGY CLINIC | Age: 70
End: 2020-05-01

## 2020-05-01 DIAGNOSIS — I48.0 PAROXYSMAL ATRIAL FIBRILLATION (HCC): ICD-10-CM

## 2020-05-01 DIAGNOSIS — Z79.01 LONG TERM (CURRENT) USE OF ANTICOAGULANTS: Primary | ICD-10-CM

## 2020-05-01 LAB
INR BLD: 2.2 (ref 1–1.5)
PT POC: 25.8 SECONDS (ref 9.1–12)
VALID INTERNAL CONTROL?: YES

## 2020-06-05 ENCOUNTER — ANTI-COAG VISIT (OUTPATIENT)
Dept: CARDIOLOGY CLINIC | Age: 70
End: 2020-06-05

## 2020-06-05 DIAGNOSIS — I48.0 PAROXYSMAL ATRIAL FIBRILLATION (HCC): ICD-10-CM

## 2020-06-05 DIAGNOSIS — Z79.01 LONG TERM (CURRENT) USE OF ANTICOAGULANTS: Primary | ICD-10-CM

## 2020-06-05 LAB
INR BLD: 2.1 (ref 1–1.5)
PT POC: 24.9 SECONDS (ref 9.1–12)
VALID INTERNAL CONTROL?: YES

## 2020-06-08 RX ORDER — WARFARIN SODIUM 5 MG/1
TABLET ORAL
Qty: 102 TAB | Refills: 1 | Status: SHIPPED | OUTPATIENT
Start: 2020-06-08 | End: 2021-01-03

## 2020-07-02 ENCOUNTER — ANTI-COAG VISIT (OUTPATIENT)
Dept: CARDIOLOGY CLINIC | Age: 70
End: 2020-07-02

## 2020-07-02 DIAGNOSIS — Z79.01 LONG TERM (CURRENT) USE OF ANTICOAGULANTS: Primary | ICD-10-CM

## 2020-07-02 DIAGNOSIS — I48.0 PAROXYSMAL ATRIAL FIBRILLATION (HCC): ICD-10-CM

## 2020-07-02 LAB
INR BLD: 1.7 (ref 1–1.5)
PT POC: 20.2 SECONDS (ref 9.1–12)
VALID INTERNAL CONTROL?: YES

## 2020-07-02 NOTE — PROGRESS NOTES
A full discussion of the nature of anticoagulants has been carried out. A benefit risk analysis has been presented to the patient, so that they understand the justification for choosing anticoagulation at this time. The need for frequent and regular monitoring, precise dosage adjustment and compliance is stressed. Side effects of potential bleeding are discussed. The patient should avoid any OTC items containing aspirin, naproxen or ibuprofen, and should avoid great swings in general diet. Avoid alcohol consumption. Advised to notify the office if antibiotic or steroid therapy is initiated. Call if any signs of abnormal bleeding are present. Next PT/INR test Tuesday.

## 2020-07-07 ENCOUNTER — ANTI-COAG VISIT (OUTPATIENT)
Dept: CARDIOLOGY CLINIC | Age: 70
End: 2020-07-07

## 2020-07-07 DIAGNOSIS — I48.0 PAROXYSMAL ATRIAL FIBRILLATION (HCC): ICD-10-CM

## 2020-07-07 DIAGNOSIS — Z79.01 LONG TERM (CURRENT) USE OF ANTICOAGULANTS: Primary | ICD-10-CM

## 2020-07-07 LAB
INR BLD: 2.8 (ref 1–1.5)
PT POC: 33.9 SECONDS (ref 9.1–12)
VALID INTERNAL CONTROL?: YES

## 2020-08-05 ENCOUNTER — OFFICE VISIT (OUTPATIENT)
Dept: CARDIOLOGY CLINIC | Age: 70
End: 2020-08-05
Payer: MEDICARE

## 2020-08-05 ENCOUNTER — CLINICAL SUPPORT (OUTPATIENT)
Dept: CARDIOLOGY CLINIC | Age: 70
End: 2020-08-05
Payer: MEDICARE

## 2020-08-05 ENCOUNTER — ANTI-COAG VISIT (OUTPATIENT)
Dept: CARDIOLOGY CLINIC | Age: 70
End: 2020-08-05
Payer: MEDICARE

## 2020-08-05 VITALS
WEIGHT: 225.2 LBS | RESPIRATION RATE: 18 BRPM | SYSTOLIC BLOOD PRESSURE: 104 MMHG | OXYGEN SATURATION: 96 % | BODY MASS INDEX: 33.36 KG/M2 | DIASTOLIC BLOOD PRESSURE: 70 MMHG | HEART RATE: 65 BPM | HEIGHT: 69 IN

## 2020-08-05 DIAGNOSIS — I48.0 PAROXYSMAL ATRIAL FIBRILLATION (HCC): ICD-10-CM

## 2020-08-05 DIAGNOSIS — I48.0 PAROXYSMAL ATRIAL FIBRILLATION (HCC): Primary | ICD-10-CM

## 2020-08-05 DIAGNOSIS — Z79.01 LONG TERM (CURRENT) USE OF ANTICOAGULANTS: Primary | ICD-10-CM

## 2020-08-05 DIAGNOSIS — I25.5 CARDIOMYOPATHY, ISCHEMIC: ICD-10-CM

## 2020-08-05 DIAGNOSIS — Z79.01 LONG TERM (CURRENT) USE OF ANTICOAGULANTS: ICD-10-CM

## 2020-08-05 DIAGNOSIS — Z95.810 BIVENTRICULAR ICD (IMPLANTABLE CARDIOVERTER-DEFIBRILLATOR) IN PLACE: Primary | ICD-10-CM

## 2020-08-05 DIAGNOSIS — I10 ESSENTIAL HYPERTENSION, BENIGN: ICD-10-CM

## 2020-08-05 DIAGNOSIS — Z95.810 BIVENTRICULAR ICD (IMPLANTABLE CARDIOVERTER-DEFIBRILLATOR) IN PLACE: ICD-10-CM

## 2020-08-05 LAB
INR BLD: 3 (ref 1–1.5)
PT POC: 36.5 SECONDS (ref 9.1–12)
VALID INTERNAL CONTROL?: YES

## 2020-08-05 PROCEDURE — 85610 PROTHROMBIN TIME: CPT | Performed by: INTERNAL MEDICINE

## 2020-08-05 PROCEDURE — G8754 DIAS BP LESS 90: HCPCS | Performed by: NURSE PRACTITIONER

## 2020-08-05 PROCEDURE — 1101F PT FALLS ASSESS-DOCD LE1/YR: CPT | Performed by: NURSE PRACTITIONER

## 2020-08-05 PROCEDURE — 3017F COLORECTAL CA SCREEN DOC REV: CPT | Performed by: NURSE PRACTITIONER

## 2020-08-05 PROCEDURE — G8752 SYS BP LESS 140: HCPCS | Performed by: NURSE PRACTITIONER

## 2020-08-05 PROCEDURE — 93000 ELECTROCARDIOGRAM COMPLETE: CPT | Performed by: NURSE PRACTITIONER

## 2020-08-05 PROCEDURE — 99214 OFFICE O/P EST MOD 30 MIN: CPT | Performed by: NURSE PRACTITIONER

## 2020-08-05 PROCEDURE — G8417 CALC BMI ABV UP PARAM F/U: HCPCS | Performed by: NURSE PRACTITIONER

## 2020-08-05 PROCEDURE — 93284 PRGRMG EVAL IMPLANTABLE DFB: CPT | Performed by: INTERNAL MEDICINE

## 2020-08-05 PROCEDURE — G8427 DOCREV CUR MEDS BY ELIG CLIN: HCPCS | Performed by: NURSE PRACTITIONER

## 2020-08-05 PROCEDURE — G8536 NO DOC ELDER MAL SCRN: HCPCS | Performed by: NURSE PRACTITIONER

## 2020-08-05 PROCEDURE — G8432 DEP SCR NOT DOC, RNG: HCPCS | Performed by: NURSE PRACTITIONER

## 2020-08-05 NOTE — PROGRESS NOTES
Subjective:      Brian Walden is a 71 y.o. male is here for EP follow up. The patient denies chest pain/ shortness of breath, orthopnea, PND, LE edema, palpitations, syncope, presyncope or fatigue. Buried his son in law a week ago due to stomach CA. Reports family is doing ok. Brian Walden  is on [address], reports no melena, hematuria, or obvious signs of bleeding. No falls.        Patient Active Problem List    Diagnosis Date Noted    Cardiomyopathy [facility]) 03/08/2019     Priority: 1 - One    S/P cardiac cath 02/08/2019    Nonischemic cardiomyopathy ([facility] [address] 02/08/2019    Cardiomyopathy, ischemic 02/08/2019    Syncope 12/21/2018    ASHD (arteriosclerotic heart disease) 97/23/0446    Systolic CHF, chronic ([address] 09/17/2013    Essential hypertension, benign 09/17/2013    Mixed hyperlipidemia 09/17/2013      Shea Mccallum MD  Past Medical History:   Diagnosis Date    CAD (coronary artery disease)     Chronic systolic HF (heart failure) ([address]     DM type 2 (diabetes mellitus, type 2) ([address]     Gout     HTN (hypertension)     Hypercholesterolemia     Nonischemic cardiomyopathy ([facility] [address] 2/8/2019    S/P cardiac cath 2/8/2019 2/8/19 cardiac cath with nonobstructive disease      Past Surgical History:   Procedure Laterality Date    CARDIAC SURG PROCEDURE UNLIST      HX CORONARY ARTERY BYPASS GRAFT  2005    [place] INSJ/RPLCMT PERM DFB W/TRNSVNS LDS 1/DUAL CHMBR N/A 3/8/2019    INSERT ICD BIV MULTI performed by Renny Sherman MD at [facility] CARDIAC CATH LAB     No Known Allergies   Family History   Problem Relation Age of Onset    Heart Disease Father     Heart Attack Father     Hypertension Brother     negative for cardiac disease  Social History     Socioeconomic History    Marital status:      Spouse name: Not on file    Number of children: Not on file    Years of education: Not on file    Highest education level: Not on file   Tobacco Use    Smoking status: Current Every Day Smoker Packs/day: 0.25     Years: 40.00     Pack years: 10.00     Types: Cigarettes    Smokeless tobacco: Never Used    Tobacco comment: 4-8 cigarettes a day   Substance and Sexual Activity    Alcohol use: Yes     Comment: occasionally     Drug use: No     Current Outpatient Medications   Medication Sig    warfarin (COUMADIN) 5 mg tablet TAKE 1 AND 1/2 TABLET ON FRIDAYS AND 1 TABLET ALL OTHER DAYS.  atorvastatin (LIPITOR) 10 mg tablet TAKE 1 TABLET EVERY DAY    metFORMIN ER (GLUCOPHAGE XR) 750 mg tablet Take 750 mg by mouth daily.  glimepiride (AMARYL) 2 mg tablet     ENTRESTO 24-26 mg tablet TAKE 1 TABLET BY MOUTH TWICE A DAY    ACCU-CHEK DAVE PLUS METER misc USE TO TEST BLOOD SUGAR    multivitamin (ONE A DAY) tablet Take 1 Tab by mouth daily.  aspirin delayed-release 81 mg tablet Take 81 mg by mouth daily.  carvedilol (COREG) 25 mg tablet Take 25 mg by mouth two (2) times daily (with meals).  potassium chloride (KLOR-CON M10) 10 mEq tablet Take 10 mEq by mouth two (2) times a day.  furosemide (LASIX) 20 mg tablet Take 20 mg by mouth daily.  amLODIPine (NORVASC) 5 mg tablet Take 5 mg by mouth daily. No current facility-administered medications for this visit. Vitals:    08/05/20 0944   BP: 104/70   Pulse: 65   Resp: 18   SpO2: 96%   Weight: 225 lb 3.2 oz (102.2 kg)   Height: 5' 9\" (1.753 m)       I have reviewed the nurses notes, vitals, problem list, allergy list, medical history, family, social history and medications. Review of Symptoms:    General: Pt denies excessive weight gain or loss. Pt is able to conduct ADL's  HEENT: Denies blurred vision, headaches, epistaxis and difficulty swallowing. Respiratory: Denies shortness of breath, TO, wheezing or stridor.   Cardiovascular: Denies precordial pain, palpitations, edema or PND  Gastrointestinal: Denies poor appetite, indigestion, abdominal pain or blood in stool  Urinary: Denies dysuria, pyuria  Musculoskeletal: Denies pain or swelling from muscles or joints  Neurologic: Denies tremor, paresthesias, or sensory motor disturbance  Skin: Denies rash, itching or texture change. Psych: Denies depression      Physical Exam:      General: Well developed, in no acute distress. HEENT: Eyes - PERRL, no jvd  Heart:  Normal S1/S2 negative S3 or S4. Regular, no murmur, gallop or rub. Respiratory: Clear bilaterally x 4, no wheezing or rales  Extremities:  No edema, normal cap refill, no cyanosis. Musculoskeletal: No clubbing  Neuro: A&Ox3, speech clear, gait stable. Skin: Skin color is normal. No rashes or lesions.  Non diaphoretic. no ulcers  Vascular: 2+ pulses symmetric in all extremities  Psych - judgement intact and orientation is wnl       Cardiographics    Ekg: V paced    Results for orders placed or performed during the hospital encounter of 03/08/19   EKG, 12 LEAD, INITIAL   Result Value Ref Range    Ventricular Rate 71 BPM    Atrial Rate 71 BPM    P-R Interval 146 ms    QRS Duration 132 ms    Q-T Interval 440 ms    QTC Calculation (Bezet) 478 ms    Calculated P Axis 36 degrees    Calculated R Axis 164 degrees    Calculated T Axis -35 degrees    Diagnosis       Electronic ventricular pacemaker  When compared with ECG of 21-DEC-2018 11:39,  Electronic ventricular pacemaker has replaced Sinus rhythm  Confirmed by Enriqueta Pineda ([id number]) on 3/11/2019 9:23:53 AM           Lab Results   Component Value Date/Time    WBC 4.3 02/28/2019 03:44 PM    HGB 15.5 02/28/2019 03:44 PM    HCT 45.4 02/28/2019 03:44 PM    PLATELET 588 (L) 64/36/6416 03:44 PM    MCV 84 02/28/2019 03:44 PM      Lab Results   Component Value Date/Time    Sodium 145 (H) 02/28/2019 03:44 PM    Potassium 4.0 02/28/2019 03:44 PM    Chloride 104 02/28/2019 03:44 PM    CO2 24 02/28/2019 03:44 PM    Anion gap 7 12/23/2018 04:52 AM    Glucose 139 (H) 02/28/2019 03:44 PM    BUN 11 02/28/2019 03:44 PM    Creatinine 1.02 02/28/2019 03:44 PM    BUN/Creatinine ratio 11 02/28/2019 03:44 PM    GFR est AA 87 02/28/2019 03:44 PM    GFR est non-AA 75 02/28/2019 03:44 PM    Calcium 9.2 02/28/2019 03:44 PM    Bilirubin, total 0.3 02/28/2019 03:44 PM    Alk. phosphatase 84 02/28/2019 03:44 PM    Protein, total 6.5 02/28/2019 03:44 PM    Albumin 4.1 02/28/2019 03:44 PM    Globulin 3.6 12/21/2018 11:48 AM    A-G Ratio 1.7 02/28/2019 03:44 PM    ALT (SGPT) 19 02/28/2019 03:44 PM      Lab Results   Component Value Date/Time    TSH 0.61 12/21/2018 06:45 PM        Assessment:           ICD-10-CM ICD-9-CM    1. Paroxysmal atrial fibrillation (HCC)  I48.0 427.31 AMB POC EKG ROUTINE W/ 12 LEADS, INTER & REP   2. Biventricular ICD (implantable cardioverter-defibrillator) in place  Z95.810 V45.02    3. Cardiomyopathy, ischemic  I25.5 414.8    4. Essential hypertension, benign  I10 401.1    5. Long term (current) use of anticoagulants  Z79.01 V58.61      Orders Placed This Encounter    AMB POC EKG ROUTINE W/ 12 LEADS, INTER & REP     Order Specific Question:   Reason for Exam:     Answer:   routine        Plan:     Mr Mery Lopez is here for annual follow up for BIV ICD implantation. Device interrogation demonstrates normal functioning. He is 24% AP and LV paced only 85%. Battery life remaining is 10/5 years. He denies cardiac complaints. He has AF <1% of the time on [address] [place] [address] Road. [facility] 2/19, EF = 15 - 20%. He is V paced on EKG and normotensive on entresto and coreg. He is enrolled in remote monitoring and I will see him in 1 year. Thank you for allowing me to participate in Dolores Bear  's care.       Kimmie Stewart NP

## 2020-08-05 NOTE — PROGRESS NOTES
Chief Complaint   Patient presents with    Irregular Heart Beat     Annual follow up      1. Have you been to the ER, urgent care clinic since your last visit? Hospitalized since your last visit? Yes [facility] for 2 days  12/19 - passed out in [address]     2. Have you seen or consulted any other health care providers outside of the [address] [place] [address] since your last visit? Include any pap smears or colon screening.   NO

## 2020-08-20 PROBLEM — Z95.1 S/P CABG X 5: Status: ACTIVE | Noted: 2020-08-20

## 2020-08-20 PROBLEM — Z95.810 BIVENTRICULAR ICD (IMPLANTABLE CARDIOVERTER-DEFIBRILLATOR) IN PLACE: Status: ACTIVE | Noted: 2020-08-20

## 2020-09-03 ENCOUNTER — ANTI-COAG VISIT (OUTPATIENT)
Dept: CARDIOLOGY CLINIC | Age: 70
End: 2020-09-03
Payer: MEDICARE

## 2020-09-03 DIAGNOSIS — I48.0 PAROXYSMAL ATRIAL FIBRILLATION (HCC): ICD-10-CM

## 2020-09-03 DIAGNOSIS — Z79.01 LONG TERM (CURRENT) USE OF ANTICOAGULANTS: Primary | ICD-10-CM

## 2020-09-03 LAB
INR BLD: 2.3 (ref 1–1.5)
PT POC: 27.2 SECONDS (ref 9.1–12)
VALID INTERNAL CONTROL?: YES

## 2020-09-03 PROCEDURE — 85610 PROTHROMBIN TIME: CPT | Performed by: INTERNAL MEDICINE

## 2020-09-10 ENCOUNTER — OFFICE VISIT (OUTPATIENT)
Dept: CARDIOLOGY CLINIC | Age: 70
End: 2020-09-10
Payer: MEDICARE

## 2020-09-10 VITALS
HEART RATE: 67 BPM | SYSTOLIC BLOOD PRESSURE: 122 MMHG | OXYGEN SATURATION: 97 % | RESPIRATION RATE: 18 BRPM | DIASTOLIC BLOOD PRESSURE: 82 MMHG | BODY MASS INDEX: 32.82 KG/M2 | WEIGHT: 221.6 LBS | HEIGHT: 69 IN

## 2020-09-10 DIAGNOSIS — E78.2 MIXED HYPERLIPIDEMIA: ICD-10-CM

## 2020-09-10 DIAGNOSIS — I48.0 PAROXYSMAL ATRIAL FIBRILLATION (HCC): ICD-10-CM

## 2020-09-10 DIAGNOSIS — I50.22 SYSTOLIC CHF, CHRONIC (HCC): Primary | ICD-10-CM

## 2020-09-10 DIAGNOSIS — I25.10 ASHD (ARTERIOSCLEROTIC HEART DISEASE): ICD-10-CM

## 2020-09-10 DIAGNOSIS — Z95.810 ICD (IMPLANTABLE CARDIOVERTER-DEFIBRILLATOR) IN PLACE: ICD-10-CM

## 2020-09-10 PROCEDURE — G8752 SYS BP LESS 140: HCPCS | Performed by: INTERNAL MEDICINE

## 2020-09-10 PROCEDURE — G8427 DOCREV CUR MEDS BY ELIG CLIN: HCPCS | Performed by: INTERNAL MEDICINE

## 2020-09-10 PROCEDURE — 1101F PT FALLS ASSESS-DOCD LE1/YR: CPT | Performed by: INTERNAL MEDICINE

## 2020-09-10 PROCEDURE — G8754 DIAS BP LESS 90: HCPCS | Performed by: INTERNAL MEDICINE

## 2020-09-10 PROCEDURE — G8510 SCR DEP NEG, NO PLAN REQD: HCPCS | Performed by: INTERNAL MEDICINE

## 2020-09-10 PROCEDURE — 3017F COLORECTAL CA SCREEN DOC REV: CPT | Performed by: INTERNAL MEDICINE

## 2020-09-10 PROCEDURE — 99213 OFFICE O/P EST LOW 20 MIN: CPT | Performed by: INTERNAL MEDICINE

## 2020-09-10 PROCEDURE — 93000 ELECTROCARDIOGRAM COMPLETE: CPT | Performed by: INTERNAL MEDICINE

## 2020-09-10 PROCEDURE — G8417 CALC BMI ABV UP PARAM F/U: HCPCS | Performed by: INTERNAL MEDICINE

## 2020-09-10 PROCEDURE — G8536 NO DOC ELDER MAL SCRN: HCPCS | Performed by: INTERNAL MEDICINE

## 2020-09-10 NOTE — LETTER
9/10/20 Patient: Wesley Christensen YOB: 1950 Date of Visit: 9/10/2020 Cindy Bach MD 
[address] [address] VIA Facsimile: [phone] Dear Cindy Bach MD, Thank you for referring Mr. Wesley Christensen to [facility] for evaluation. My notes for this consultation are attached. If you have questions, please do not hesitate to call me. I look forward to following your patient along with you. Sincerely, Gauri Prather MD

## 2020-09-10 NOTE — PROGRESS NOTES
Chief Complaint   Patient presents with    CHF     Annual follow up - denies cardiac sx     Hypertension     1. Have you been to the ER, urgent care clinic since your last visit? Hospitalized since your last visit? YES [facility] 12/2019 - for fainting in [employer] parking lab - has weakness on right side     2. Have you seen or consulted any other health care providers outside of the [address] [place] [address] [place] since your last visit? Include any pap smears or colon screening.   Yes see above        Patient would like us to send copy of todays visit to Autumn Kerr MD PCP

## 2020-09-10 NOTE — PROGRESS NOTES
Subjective/HPI:     Terrall Burkitt is a 71 y.o. male is here for routine f/u. He has a PMHx of chronic systolic CHF, afib, ICD. No complaints today. Denies any chest pain, SOB, edema. PCP Provider  Josefina Umana MD    Past Medical History:   Diagnosis Date    CAD (coronary artery disease)     Chronic systolic HF (heart failure) ([facility]     DM type 2 (diabetes mellitus, type 2) ([facility]     Gout     HTN (hypertension)     Hypercholesterolemia     [facility] 2/8/2019    S/P cardiac cath 2/8/2019 2/8/19 cardiac cath with nonobstructive disease        No Known Allergies     Outpatient Encounter Medications as of 9/10/2020   Medication Sig Dispense Refill    warfarin (COUMADIN) 5 mg tablet TAKE 1 AND 1/2 TABLET ON FRIDAYS AND 1 TABLET ALL OTHER DAYS. 102 Tab 1    atorvastatin (LIPITOR) 10 mg tablet TAKE 1 TABLET EVERY DAY 90 Tab 0    metFORMIN ER (GLUCOPHAGE XR) 750 mg tablet Take 750 mg by mouth daily.  glimepiride (AMARYL) 2 mg tablet Take 2 mg by mouth every morning.  ENTRESTO 24-26 mg tablet TAKE 1 TABLET BY MOUTH TWICE A DAY 60 Tab 1    ACCU-CHEK DAVE PLUS METER [facility] USE TO TEST BLOOD SUGAR  0    multivitamin (ONE A DAY) tablet Take 1 Tab by mouth daily.  aspirin delayed-release 81 mg tablet Take 81 mg by mouth daily.  carvedilol (COREG) 25 mg tablet Take 25 mg by mouth two (2) times daily (with meals).  potassium chloride (KLOR-CON M10) 10 mEq tablet Take 10 mEq by mouth two (2) times a day.  furosemide (LASIX) 20 mg tablet Take 20 mg by mouth daily.  amLODIPine (NORVASC) 5 mg tablet Take 5 mg by mouth daily. No facility-administered encounter medications on file as of 9/10/2020. Review of Symptoms:    ROS    Physical Exam:      General: Well developed, in no acute distress, cooperative and alert  HEENT: No carotid bruits, no JVD, trach is midline. Neck Supple, PEERL, EOM intact.   Heart:  reg rate and rhythm; normal S1/S2; no murmurs, no gallops or rubs. Respiratory: Clear bilaterally x 4, no wheezing or rales  Abdomen:   Soft, non-tender, no distention, no masses. + BS. Extremities:  Normal cap refill, no cyanosis, atraumatic. No edema. Neuro: A&Ox3, speech clear, gait stable. Skin: Skin color is normal. No rashes or lesions. Non diaphoretic  Vascular: 2+ pulses symmetric in all extremities    Visit Vitals  /82 (BP 1 Location: Left arm, BP Patient Position: Sitting)   Pulse 67   Resp 18   Ht 5' 9\" (1.753 m)   Wt 221 lb 9.6 oz (100.5 kg)   SpO2 97%   BMI 32.72 kg/m²       ECG: sinus with intermittent v pace    Cardiology Labs:    Lab Results   Component Value Date/Time    Cholesterol, total 94 (L) 02/07/2019 08:09 AM    HDL Cholesterol 31 (L) 02/07/2019 08:09 AM    LDL, calculated 28 02/07/2019 08:09 AM    LDL, calculated 64 02/20/2018 10:21 AM    VLDL, calculated 35 02/07/2019 08:09 AM       Lab Results   Component Value Date/Time    Hemoglobin A1c 9.0 (H) 12/21/2018 06:45 PM       Lab Results   Component Value Date/Time    Sodium 145 (H) 02/28/2019 03:44 PM    Potassium 4.0 02/28/2019 03:44 PM    Chloride 104 02/28/2019 03:44 PM    CO2 24 02/28/2019 03:44 PM    Glucose 139 (H) 02/28/2019 03:44 PM    BUN 11 02/28/2019 03:44 PM    Creatinine 1.02 02/28/2019 03:44 PM    BUN/Creatinine ratio 11 02/28/2019 03:44 PM    GFR est AA 87 02/28/2019 03:44 PM    GFR est non-AA 75 02/28/2019 03:44 PM    Calcium 9.2 02/28/2019 03:44 PM    Anion gap 7 12/23/2018 04:52 AM    Bilirubin, total 0.3 02/28/2019 03:44 PM    ALT (SGPT) 19 02/28/2019 03:44 PM    Alk. phosphatase 84 02/28/2019 03:44 PM    Protein, total 6.5 02/28/2019 03:44 PM    Albumin 4.1 02/28/2019 03:44 PM    Globulin 3.6 12/21/2018 11:48 AM    A-G Ratio 1.7 02/28/2019 03:44 PM          Assessment:       ICD-10-CM ICD-9-CM    1. Systolic CHF, chronic (HCC)  I50.22 428.22 AMB POC EKG ROUTINE W/ 12 LEADS, INTER & REP     428.0    2.  Mixed hyperlipidemia E78.2 272.2    3. Paroxysmal atrial fibrillation (HCC)  I48.0 427.31    4. ASHD (arteriosclerotic heart disease)  I25.10 414.00    5. ICD (implantable cardioverter-defibrillator) in place  Z95.810 V45.02         Plan:     1. Systolic CHF, chronic ([address]  Well compensated. Continue current therapy. - AMB POC EKG ROUTINE W/ 12 LEADS, INTER & REP    2. Mixed hyperlipidemia  At goal, continue statin therapy. 3. Paroxysmal atrial fibrillation (HCC)  On warfarin. 4. ASHD (arteriosclerotic heart disease)  Stable. 5. ICD (implantable cardioverter-defibrillator) in place  Followed by device clinic.       Antonio Barkley MD

## 2020-10-06 ENCOUNTER — ANTI-COAG VISIT (OUTPATIENT)
Dept: CARDIOLOGY CLINIC | Age: 70
End: 2020-10-06
Payer: MEDICARE

## 2020-10-06 DIAGNOSIS — Z79.01 LONG TERM (CURRENT) USE OF ANTICOAGULANTS: Primary | ICD-10-CM

## 2020-10-06 DIAGNOSIS — I48.0 PAROXYSMAL ATRIAL FIBRILLATION (HCC): ICD-10-CM

## 2020-10-06 LAB
INR BLD: 2.8 (ref 1–1.5)
PT POC: 34 SECONDS (ref 9.1–12)
VALID INTERNAL CONTROL?: YES

## 2020-10-06 PROCEDURE — 85610 PROTHROMBIN TIME: CPT | Performed by: INTERNAL MEDICINE

## 2020-11-05 ENCOUNTER — OFFICE VISIT (OUTPATIENT)
Dept: CARDIOLOGY CLINIC | Age: 70
End: 2020-11-05
Payer: MEDICARE

## 2020-11-05 DIAGNOSIS — Z95.810 ICD (IMPLANTABLE CARDIOVERTER-DEFIBRILLATOR) IN PLACE: Primary | ICD-10-CM

## 2020-11-05 DIAGNOSIS — I50.22 SYSTOLIC CHF, CHRONIC (HCC): ICD-10-CM

## 2020-11-05 PROCEDURE — 93296 REM INTERROG EVL PM/IDS: CPT | Performed by: INTERNAL MEDICINE

## 2020-11-05 PROCEDURE — 93295 DEV INTERROG REMOTE 1/2/MLT: CPT | Performed by: INTERNAL MEDICINE

## 2020-11-10 ENCOUNTER — ANTI-COAG VISIT (OUTPATIENT)
Dept: CARDIOLOGY CLINIC | Age: 70
End: 2020-11-10
Payer: MEDICARE

## 2020-11-10 DIAGNOSIS — I48.0 PAROXYSMAL ATRIAL FIBRILLATION (HCC): ICD-10-CM

## 2020-11-10 DIAGNOSIS — Z79.01 LONG TERM (CURRENT) USE OF ANTICOAGULANTS: Primary | ICD-10-CM

## 2020-11-10 LAB
INR BLD: 2.9 (ref 1–1.5)
PT POC: 34.6 SECONDS (ref 9.1–12)
VALID INTERNAL CONTROL?: YES

## 2020-11-10 PROCEDURE — 85610 PROTHROMBIN TIME: CPT | Performed by: INTERNAL MEDICINE

## 2020-12-08 ENCOUNTER — OFFICE VISIT (OUTPATIENT)
Dept: CARDIOLOGY CLINIC | Age: 70
End: 2020-12-08
Payer: MEDICARE

## 2020-12-08 ENCOUNTER — ANTI-COAG VISIT (OUTPATIENT)
Dept: CARDIOLOGY CLINIC | Age: 70
End: 2020-12-08
Payer: MEDICARE

## 2020-12-08 VITALS
HEART RATE: 76 BPM | RESPIRATION RATE: 20 BRPM | BODY MASS INDEX: 33.56 KG/M2 | HEIGHT: 69 IN | DIASTOLIC BLOOD PRESSURE: 85 MMHG | OXYGEN SATURATION: 97 % | WEIGHT: 226.6 LBS | SYSTOLIC BLOOD PRESSURE: 120 MMHG

## 2020-12-08 DIAGNOSIS — I42.8 NONISCHEMIC CARDIOMYOPATHY (HCC): ICD-10-CM

## 2020-12-08 DIAGNOSIS — Z79.01 LONG TERM (CURRENT) USE OF ANTICOAGULANTS: Primary | ICD-10-CM

## 2020-12-08 DIAGNOSIS — I25.5 CARDIOMYOPATHY, ISCHEMIC: Primary | ICD-10-CM

## 2020-12-08 DIAGNOSIS — I10 ESSENTIAL HYPERTENSION, BENIGN: ICD-10-CM

## 2020-12-08 DIAGNOSIS — I48.0 PAROXYSMAL ATRIAL FIBRILLATION (HCC): ICD-10-CM

## 2020-12-08 DIAGNOSIS — E78.2 MIXED HYPERLIPIDEMIA: ICD-10-CM

## 2020-12-08 DIAGNOSIS — I50.22 SYSTOLIC CHF, CHRONIC (HCC): ICD-10-CM

## 2020-12-08 LAB
INR BLD: 3 (ref 1–1.5)
PT POC: 35.4 SECONDS (ref 9.1–12)
VALID INTERNAL CONTROL?: YES

## 2020-12-08 PROCEDURE — 93000 ELECTROCARDIOGRAM COMPLETE: CPT | Performed by: INTERNAL MEDICINE

## 2020-12-08 PROCEDURE — G8754 DIAS BP LESS 90: HCPCS | Performed by: INTERNAL MEDICINE

## 2020-12-08 PROCEDURE — G8510 SCR DEP NEG, NO PLAN REQD: HCPCS | Performed by: INTERNAL MEDICINE

## 2020-12-08 PROCEDURE — 85610 PROTHROMBIN TIME: CPT | Performed by: INTERNAL MEDICINE

## 2020-12-08 PROCEDURE — 1101F PT FALLS ASSESS-DOCD LE1/YR: CPT | Performed by: INTERNAL MEDICINE

## 2020-12-08 PROCEDURE — 99214 OFFICE O/P EST MOD 30 MIN: CPT | Performed by: INTERNAL MEDICINE

## 2020-12-08 PROCEDURE — 3017F COLORECTAL CA SCREEN DOC REV: CPT | Performed by: INTERNAL MEDICINE

## 2020-12-08 PROCEDURE — G8427 DOCREV CUR MEDS BY ELIG CLIN: HCPCS | Performed by: INTERNAL MEDICINE

## 2020-12-08 PROCEDURE — G8752 SYS BP LESS 140: HCPCS | Performed by: INTERNAL MEDICINE

## 2020-12-08 PROCEDURE — G8536 NO DOC ELDER MAL SCRN: HCPCS | Performed by: INTERNAL MEDICINE

## 2020-12-08 PROCEDURE — G8417 CALC BMI ABV UP PARAM F/U: HCPCS | Performed by: INTERNAL MEDICINE

## 2020-12-08 NOTE — LETTER
12/8/20 Patient: Milagro Barksdale YOB: 1950 Date of Visit: 12/8/2020 Nancy Sears MD 
[address] [address] VIA Facsimile: [phone] Dear Nancy Sears MD, Thank you for referring Mr. Milagro Barksdale to [facility] for evaluation. My notes for this consultation are attached. If you have questions, please do not hesitate to call me. I look forward to following your patient along with you. Sincerely, Steve Aguilar MD

## 2020-12-08 NOTE — PROGRESS NOTES
Chief Complaint   Patient presents with    Irregular Heart Beat     Here to discuss a fib - was called to make an appt - denies cardiac sx      1. Have you been to the ER, urgent care clinic since your last visit? Hospitalized since your last visit? NO     2. Have you seen or consulted any other health care providers outside of the [address] [place] [address] since your last visit? Include any pap smears or colon screening.   NO

## 2020-12-08 NOTE — PROGRESS NOTES
Subjective:      Amber Woo is a 79 y.o. male is here for EP consult. Asked to come in for increased AF burden on his icd. The patient denies chest pain/ shortness of breath, orthopnea, PND, LE edema, palpitations, syncope, presyncope or fatigue.        Patient Active Problem List    Diagnosis Date Noted    S/P CABG x 5 08/20/2020    Biventricular ICD (implantable cardioverter-defibrillator) in place 08/20/2020    S/P cardiac cath 02/08/2019    Nonischemic cardiomyopathy ([facility] 02/08/2019    Cardiomyopathy, ischemic 02/08/2019    Syncope 12/21/2018    ASHD (arteriosclerotic heart disease) 02/26/6578    Systolic CHF, chronic ([facility] 09/17/2013    Essential hypertension, benign 09/17/2013    Mixed hyperlipidemia 09/17/2013      Ragini Avelar MD  Past Medical History:   Diagnosis Date    CAD (coronary artery disease)     Chronic systolic HF (heart failure) ([facility]     DM type 2 (diabetes mellitus, type 2) ([facility]     Gout     HTN (hypertension)     Hypercholesterolemia     Nonischemic cardiomyopathy ([facility] 2/8/2019    S/P cardiac cath 2/8/2019 2/8/19 cardiac cath with nonobstructive disease      Past Surgical History:   Procedure Laterality Date    CARDIAC SURG PROCEDURE UNLIST      HX CORONARY ARTERY BYPASS GRAFT  2005    HX PACEMAKER Left 03/08/2019    ICD    IL INSJ/RPLCMT PERM DFB W/TRNSVNS LDS 1/DUAL CHMBR N/A 3/8/2019    INSERT ICD BIV MULTI performed by Navya Law MD at [facility] CARDIAC CATH LAB     No Known Allergies   Family History   Problem Relation Age of Onset    Heart Disease Father     Heart Attack Father     Hypertension Brother     negative for cardiac disease  Social History     Socioeconomic History    Marital status:      Spouse name: Not on file    Number of children: Not on file    Years of education: Not on file    Highest education level: Not on file   Tobacco Use    Smoking status: Current Every Day Smoker     Packs/day: 0.25     Years: 40.00 Pack years: 10.00     Types: Cigarettes    Smokeless tobacco: Never Used    Tobacco comment: 4-8 cigarettes a day   Substance and Sexual Activity    Alcohol use: Yes     Comment: occasionally     Drug use: No     Current Outpatient Medications   Medication Sig    warfarin (COUMADIN) 5 mg tablet TAKE 1 AND 1/2 TABLET ON FRIDAYS AND 1 TABLET ALL OTHER DAYS.  atorvastatin (LIPITOR) 10 mg tablet TAKE 1 TABLET EVERY DAY    metFORMIN ER (GLUCOPHAGE XR) 750 mg tablet Take 750 mg by mouth daily.  glimepiride (AMARYL) 2 mg tablet Take 2 mg by mouth every morning.  ENTRESTO 24-26 mg tablet TAKE 1 TABLET BY MOUTH TWICE A DAY    ACCU-CHEK DAVE PLUS METER [employer] USE TO TEST BLOOD SUGAR    multivitamin (ONE A DAY) tablet Take 1 Tab by mouth daily.  aspirin delayed-release 81 mg tablet Take 81 mg by mouth daily.  carvedilol (COREG) 25 mg tablet Take 25 mg by mouth two (2) times daily (with meals).  potassium chloride (KLOR-CON M10) 10 mEq tablet Take 10 mEq by mouth two (2) times a day.  furosemide (LASIX) 20 mg tablet Take 20 mg by mouth daily.  amLODIPine (NORVASC) 5 mg tablet Take 5 mg by mouth daily. No current facility-administered medications for this visit. Vitals:    12/08/20 1146   BP: 120/85   Pulse: 76   Resp: 20   SpO2: 97%   Weight: 226 lb 9.6 oz (102.8 kg)   Height: 5' 9\" (1.753 m)       I have reviewed the nurses notes, vitals, problem list, allergy list, medical history, family, social history and medications. Review of Symptoms:    General: Pt denies excessive weight gain or loss. Pt is able to conduct ADL's  HEENT: Denies blurred vision, headaches, hearing loss, epistaxis and difficulty swallowing. Respiratory: Denies cough, congestion, shortness of breath, TO, wheezing or stridor.   Cardiovascular: Denies precordial pain, palpitations, edema or PND  Gastrointestinal: Denies poor appetite, indigestion, abdominal pain or blood in stool  Genitourinary: Denies hematuria, dysuria, increased urinary frequency  Musculoskeletal: Denies joint pain or swelling from muscles or joints  Neurologic: Denies tremor, paresthesias, headache, or sensory motor disturbance  Psychiatric: Denies confusion, insomnia, depression  Integumentray: Denies rash, itching or ulcers. Hematologic: Denies easy bruising, bleeding    Physical Exam:      General: Well developed, in no acute distress. HEENT: Eyes - PERRL, no jvd  Heart:  Normal S1/S2 negative S3 or S4. Regular, no murmur, gallop or rub. Respiratory: Clear bilaterally x 4, no wheezing or rales  Abdomen:   Soft, non-tender, bowel sounds are active. Extremities:  No edema, normal cap refill, no cyanosis. Musculoskeletal: No clubbing  Neuro: A&Ox3, speech clear, gait stable. Skin: Skin color is normal. No rashes or lesions.  Non diaphoretic, no ulcers or subcutaneous nodule  Vascular: 2+ pulses symmetric in all extremities  Psych - judgement intact and orientation is wnl     Cardiographics    Ekg: nsr    icd - PAF episodes    Results for orders placed or performed during the hospital encounter of 03/08/19   EKG, 12 LEAD, INITIAL   Result Value Ref Range    Ventricular Rate 71 BPM    Atrial Rate 71 BPM    P-R Interval 146 ms    QRS Duration 132 ms    Q-T Interval 440 ms    QTC Calculation (Bezet) 478 ms    Calculated P Axis 36 degrees    Calculated R Axis 164 degrees    Calculated T Axis -35 degrees    Diagnosis       Electronic ventricular pacemaker  When compared with ECG of 21-DEC-2018 11:39,  Electronic ventricular pacemaker has replaced Sinus rhythm  Confirmed by Erik Walters ([id number]) on 3/11/2019 9:23:53 AM           Lab Results   Component Value Date/Time    WBC 4.3 02/28/2019 03:44 PM    HGB 15.5 02/28/2019 03:44 PM    HCT 45.4 02/28/2019 03:44 PM    PLATELET 262 (L) 03/81/4018 03:44 PM    MCV 84 02/28/2019 03:44 PM      Lab Results   Component Value Date/Time    Sodium 145 (H) 02/28/2019 03:44 PM    Potassium 4.0 02/28/2019 03:44 PM Chloride 104 02/28/2019 03:44 PM    CO2 24 02/28/2019 03:44 PM    Anion gap 7 12/23/2018 04:52 AM    Glucose 139 (H) 02/28/2019 03:44 PM    BUN 11 02/28/2019 03:44 PM    Creatinine 1.02 02/28/2019 03:44 PM    BUN/Creatinine ratio 11 02/28/2019 03:44 PM    GFR est AA 87 02/28/2019 03:44 PM    GFR est non-AA 75 02/28/2019 03:44 PM    Calcium 9.2 02/28/2019 03:44 PM    Bilirubin, total 0.3 02/28/2019 03:44 PM    Alk. phosphatase 84 02/28/2019 03:44 PM    Protein, total 6.5 02/28/2019 03:44 PM    Albumin 4.1 02/28/2019 03:44 PM    Globulin 3.6 12/21/2018 11:48 AM    A-G Ratio 1.7 02/28/2019 03:44 PM    ALT (SGPT) 19 02/28/2019 03:44 PM         Assessment:     Assessment:        ICD-10-CM ICD-9-CM    1. Cardiomyopathy, ischemic  I25.5 414.8 AMB POC EKG ROUTINE W/ 12 LEADS, INTER & REP      ECHO ADULT COMPLETE   2. Nonischemic cardiomyopathy (HCC)  I42.8 425.4 ECHO ADULT COMPLETE   3. Essential hypertension, benign  I10 401.1 ECHO ADULT COMPLETE   4. Systolic CHF, chronic (HCC)  I50.22 428.22 ECHO ADULT COMPLETE     428.0    5. Mixed hyperlipidemia  E78.2 272.2 ECHO ADULT COMPLETE   6. Paroxysmal atrial fibrillation (HCC)  I48.0 427.31 ECHO ADULT COMPLETE     Orders Placed This Encounter    AMB POC EKG ROUTINE W/ 12 LEADS, INTER & REP     Order Specific Question:   Reason for Exam:     Answer:   ROUTINE        Plan:   Peg Dorman is doing well. He is having more AF on his icd but is asymptomatic. Cont oac. Cont med rx for cardiomyopathy, chf and htn. Will obtain an echo to assess lvef and LA size. F/u in 6 months. Continue medical management for AF, htn, cardiomyyopathy. Thank you for allowing me to participate in Peg Dorman 's care.     Lyubov Gomez MD, Beckie Aldrich

## 2021-01-08 ENCOUNTER — ANCILLARY PROCEDURE (OUTPATIENT)
Dept: CARDIOLOGY CLINIC | Age: 71
End: 2021-01-08
Payer: MEDICARE

## 2021-01-08 VITALS
SYSTOLIC BLOOD PRESSURE: 120 MMHG | HEIGHT: 69 IN | DIASTOLIC BLOOD PRESSURE: 85 MMHG | BODY MASS INDEX: 33.47 KG/M2 | WEIGHT: 226 LBS

## 2021-01-08 DIAGNOSIS — I48.0 PAROXYSMAL ATRIAL FIBRILLATION (HCC): ICD-10-CM

## 2021-01-08 DIAGNOSIS — I10 ESSENTIAL HYPERTENSION, BENIGN: ICD-10-CM

## 2021-01-08 DIAGNOSIS — E78.2 MIXED HYPERLIPIDEMIA: ICD-10-CM

## 2021-01-08 DIAGNOSIS — I25.5 CARDIOMYOPATHY, ISCHEMIC: ICD-10-CM

## 2021-01-08 DIAGNOSIS — I42.8 NONISCHEMIC CARDIOMYOPATHY (HCC): ICD-10-CM

## 2021-01-08 DIAGNOSIS — I50.22 SYSTOLIC CHF, CHRONIC (HCC): ICD-10-CM

## 2021-01-08 PROCEDURE — 93306 TTE W/DOPPLER COMPLETE: CPT | Performed by: INTERNAL MEDICINE

## 2021-01-11 LAB
ECHO AO ASC DIAM: 3.82 CM
ECHO AO ROOT DIAM: 3.71 CM
ECHO AV PEAK GRADIENT: 4.85 MMHG
ECHO AV PEAK VELOCITY: 110.15 CM/S
ECHO EST RA PRESSURE: 3 MMHG
ECHO LA AREA 4C: 19.14 CM2
ECHO LA MAJOR AXIS: 4.48 CM
ECHO LA MINOR AXIS: 2.06 CM
ECHO LA VOL 2C: 58.48 ML (ref 18–58)
ECHO LA VOL 4C: 57.12 ML (ref 18–58)
ECHO LA VOL BP: 61.15 ML (ref 18–58)
ECHO LA VOL/BSA BIPLANE: 28.1 ML/M2 (ref 16–28)
ECHO LA VOLUME INDEX A2C: 26.88 ML/M2 (ref 16–28)
ECHO LA VOLUME INDEX A4C: 26.25 ML/M2 (ref 16–28)
ECHO LV E' LATERAL VELOCITY: 10.62 CM/S
ECHO LV GLOBAL LONGITUDINAL STRAIN (GLS): -9.6 PERCENT
ECHO LV INTERNAL DIMENSION DIASTOLIC: 6.11 CM (ref 4.2–5.9)
ECHO LV INTERNAL DIMENSION SYSTOLIC: 5.72 CM
ECHO LV ISOVOLUMETRIC RELAXATION TIME (IVRT): 53.28 MS
ECHO LV IVSD: 1.32 CM (ref 0.6–1)
ECHO LV MASS 2D: 251.3 G (ref 88–224)
ECHO LV MASS INDEX 2D: 115.5 G/M2 (ref 49–115)
ECHO LV POSTERIOR WALL DIASTOLIC: 0.66 CM (ref 0.6–1)
ECHO LVOT PEAK GRADIENT: 2.48 MMHG
ECHO LVOT PEAK VELOCITY: 78.71 CM/S
ECHO MV "A" WAVE DURATION: 156.04 MS
ECHO MV A VELOCITY: 72.27 CM/S
ECHO MV E DECELERATION TIME (DT): 245.92 MS
ECHO MV E VELOCITY: 28.75 CM/S
ECHO MV E/A RATIO: 0.4
ECHO MV E/E' LATERAL: 2.71
ECHO RIGHT VENTRICULAR SYSTOLIC PRESSURE (RVSP): 24.8 MMHG
ECHO TV REGURGITANT MAX VELOCITY: 233.44 CM/S
ECHO TV REGURGITANT PEAK GRADIENT: 21.8 MMHG
GLOBAL LONGITUDINAL STRAIN 2 CHAMBER: -10.1 PERCENT
GLOBAL LONGITUDINAL STRAIN 4 CHAMBER: -10.6 PERCENT
GLOBAL LONGITUDINAL STRAIN LONG AXIS: -8.2 PERCENT
LA VOL DISK BP: 58.66 ML (ref 18–58)

## 2021-01-12 ENCOUNTER — ANTI-COAG VISIT (OUTPATIENT)
Dept: CARDIOLOGY CLINIC | Age: 71
End: 2021-01-12
Payer: MEDICARE

## 2021-01-12 DIAGNOSIS — Z79.01 LONG TERM (CURRENT) USE OF ANTICOAGULANTS: Primary | ICD-10-CM

## 2021-01-12 DIAGNOSIS — I48.0 PAROXYSMAL ATRIAL FIBRILLATION (HCC): ICD-10-CM

## 2021-01-12 LAB
INR BLD: 2.5 (ref 1–1.5)
PT POC: 30.3 SECONDS (ref 9.1–12)
VALID INTERNAL CONTROL?: YES

## 2021-01-12 PROCEDURE — 85610 PROTHROMBIN TIME: CPT | Performed by: INTERNAL MEDICINE

## 2021-02-04 ENCOUNTER — OFFICE VISIT (OUTPATIENT)
Dept: CARDIOLOGY CLINIC | Age: 71
End: 2021-02-04
Payer: MEDICARE

## 2021-02-04 DIAGNOSIS — I42.8 NONISCHEMIC CARDIOMYOPATHY (HCC): ICD-10-CM

## 2021-02-04 DIAGNOSIS — Z95.810 ICD (IMPLANTABLE CARDIOVERTER-DEFIBRILLATOR) IN PLACE: Primary | ICD-10-CM

## 2021-02-04 PROCEDURE — 93295 DEV INTERROG REMOTE 1/2/MLT: CPT | Performed by: INTERNAL MEDICINE

## 2021-02-04 PROCEDURE — 93296 REM INTERROG EVL PM/IDS: CPT | Performed by: INTERNAL MEDICINE

## 2021-02-16 ENCOUNTER — ANTI-COAG VISIT (OUTPATIENT)
Dept: CARDIOLOGY CLINIC | Age: 71
End: 2021-02-16
Payer: MEDICARE

## 2021-02-16 DIAGNOSIS — Z79.01 LONG TERM (CURRENT) USE OF ANTICOAGULANTS: Primary | ICD-10-CM

## 2021-02-16 DIAGNOSIS — I48.0 PAROXYSMAL ATRIAL FIBRILLATION (HCC): ICD-10-CM

## 2021-02-16 LAB
INR BLD: 2.9 (ref 1–1.5)
PT POC: 34.8 SECONDS (ref 9.1–12)
VALID INTERNAL CONTROL?: YES

## 2021-02-16 PROCEDURE — 85610 PROTHROMBIN TIME: CPT | Performed by: INTERNAL MEDICINE

## 2021-03-16 ENCOUNTER — ANTI-COAG VISIT (OUTPATIENT)
Dept: CARDIOLOGY CLINIC | Age: 71
End: 2021-03-16
Payer: MEDICARE

## 2021-03-16 DIAGNOSIS — I48.0 PAROXYSMAL ATRIAL FIBRILLATION (HCC): ICD-10-CM

## 2021-03-16 DIAGNOSIS — Z79.01 LONG TERM (CURRENT) USE OF ANTICOAGULANTS: Primary | ICD-10-CM

## 2021-03-16 LAB
INR BLD: 2.6 (ref 1–1.5)
PT POC: 31.5 SECONDS (ref 9.1–12)
VALID INTERNAL CONTROL?: YES

## 2021-03-16 PROCEDURE — 85610 PROTHROMBIN TIME: CPT | Performed by: INTERNAL MEDICINE

## 2021-05-06 ENCOUNTER — OFFICE VISIT (OUTPATIENT)
Dept: CARDIOLOGY CLINIC | Age: 71
End: 2021-05-06
Payer: MEDICARE

## 2021-05-06 DIAGNOSIS — I42.8 NONISCHEMIC CARDIOMYOPATHY (HCC): ICD-10-CM

## 2021-05-06 DIAGNOSIS — Z95.810 ICD (IMPLANTABLE CARDIOVERTER-DEFIBRILLATOR) IN PLACE: Primary | ICD-10-CM

## 2021-05-06 PROCEDURE — 93295 DEV INTERROG REMOTE 1/2/MLT: CPT | Performed by: INTERNAL MEDICINE

## 2021-05-06 PROCEDURE — 93296 REM INTERROG EVL PM/IDS: CPT | Performed by: INTERNAL MEDICINE

## 2021-05-10 ENCOUNTER — ANTI-COAG VISIT (OUTPATIENT)
Dept: CARDIOLOGY CLINIC | Age: 71
End: 2021-05-10
Payer: MEDICARE

## 2021-05-10 DIAGNOSIS — Z79.01 LONG TERM (CURRENT) USE OF ANTICOAGULANTS: Primary | ICD-10-CM

## 2021-05-10 DIAGNOSIS — I48.0 PAROXYSMAL ATRIAL FIBRILLATION (HCC): ICD-10-CM

## 2021-05-10 LAB
INR BLD: 2.3 (ref 1–1.5)
PT POC: 27.2 SECONDS (ref 9.1–12)
VALID INTERNAL CONTROL?: YES

## 2021-05-10 PROCEDURE — 85610 PROTHROMBIN TIME: CPT | Performed by: INTERNAL MEDICINE

## 2021-05-12 ENCOUNTER — HOSPITAL ENCOUNTER (OUTPATIENT)
Dept: GENERAL RADIOLOGY | Age: 71
Discharge: HOME | End: 2021-05-12
Payer: MEDICARE

## 2021-05-12 ENCOUNTER — TRANSCRIBE ORDER (OUTPATIENT)
Dept: GENERAL RADIOLOGY | Age: 71
End: 2021-05-12

## 2021-05-12 DIAGNOSIS — S80.01XA CONTUSION OF RIGHT KNEE, INITIAL ENCOUNTER: Primary | ICD-10-CM

## 2021-05-12 DIAGNOSIS — S80.01XA CONTUSION OF RIGHT KNEE, INITIAL ENCOUNTER: ICD-10-CM

## 2021-05-12 PROCEDURE — 73562 X-RAY EXAM OF KNEE 3: CPT | Performed by: FAMILY MEDICINE

## 2021-06-23 ENCOUNTER — ANTI-COAG VISIT (OUTPATIENT)
Dept: CARDIOLOGY CLINIC | Age: 71
End: 2021-06-23
Payer: MEDICARE

## 2021-06-23 DIAGNOSIS — I48.0 PAROXYSMAL ATRIAL FIBRILLATION (HCC): ICD-10-CM

## 2021-06-23 DIAGNOSIS — Z79.01 LONG TERM (CURRENT) USE OF ANTICOAGULANTS: Primary | ICD-10-CM

## 2021-06-23 LAB
INR BLD: 3.5 (ref 1–1.5)
PT POC: 41.8 SECONDS (ref 9.1–12)
VALID INTERNAL CONTROL?: YES

## 2021-06-23 PROCEDURE — 85610 PROTHROMBIN TIME: CPT | Performed by: INTERNAL MEDICINE

## 2021-06-29 ENCOUNTER — ANTI-COAG VISIT (OUTPATIENT)
Dept: CARDIOLOGY CLINIC | Age: 71
End: 2021-06-29
Payer: MEDICARE

## 2021-06-29 DIAGNOSIS — I48.0 PAROXYSMAL ATRIAL FIBRILLATION (HCC): ICD-10-CM

## 2021-06-29 DIAGNOSIS — Z79.01 LONG TERM (CURRENT) USE OF ANTICOAGULANTS: Primary | ICD-10-CM

## 2021-06-29 LAB
INR BLD: 2.1 (ref 1–1.5)
PT POC: 25.5 SECONDS (ref 9.1–12)
VALID INTERNAL CONTROL?: YES

## 2021-06-29 PROCEDURE — 85610 PROTHROMBIN TIME: CPT | Performed by: INTERNAL MEDICINE

## 2021-07-06 RX ORDER — WARFARIN SODIUM 5 MG/1
TABLET ORAL
Qty: 102 TABLET | Refills: 1 | Status: SHIPPED | OUTPATIENT
Start: 2021-07-06 | End: 2022-06-13 | Stop reason: SDUPTHER

## 2021-07-27 ENCOUNTER — ANTI-COAG VISIT (OUTPATIENT)
Dept: CARDIOLOGY CLINIC | Age: 71
End: 2021-07-27
Payer: MEDICARE

## 2021-07-27 DIAGNOSIS — I48.0 PAROXYSMAL ATRIAL FIBRILLATION (HCC): ICD-10-CM

## 2021-07-27 DIAGNOSIS — Z79.01 LONG TERM (CURRENT) USE OF ANTICOAGULANTS: Primary | ICD-10-CM

## 2021-07-27 LAB
INR BLD: 2.5 (ref 1–1.5)
PT POC: 30.1 SECONDS (ref 9.1–12)
VALID INTERNAL CONTROL?: YES

## 2021-07-27 PROCEDURE — 85610 PROTHROMBIN TIME: CPT | Performed by: INTERNAL MEDICINE

## 2021-08-27 ENCOUNTER — ANTI-COAG VISIT (OUTPATIENT)
Dept: CARDIOLOGY CLINIC | Age: 71
End: 2021-08-27
Payer: MEDICARE

## 2021-08-27 DIAGNOSIS — I48.0 PAROXYSMAL ATRIAL FIBRILLATION (HCC): ICD-10-CM

## 2021-08-27 DIAGNOSIS — Z79.01 LONG TERM (CURRENT) USE OF ANTICOAGULANTS: Primary | ICD-10-CM

## 2021-08-27 LAB
INR BLD: 1.6 (ref 1–1.5)
PT POC: 19.3 SECONDS (ref 9.1–12)
VALID INTERNAL CONTROL?: YES

## 2021-08-27 PROCEDURE — 85610 PROTHROMBIN TIME: CPT | Performed by: INTERNAL MEDICINE

## 2021-08-27 NOTE — PROGRESS NOTES
A full discussion of the nature of anticoagulants has been carried out. A benefit risk analysis has been presented to the patient, so that they understand the justification for choosing anticoagulation at this time. The need for frequent and regular monitoring, precise dosage adjustment and compliance is stressed. Side effects of potential bleeding are discussed. The patient should avoid any OTC items containing aspirin, naproxen or ibuprofen, and should avoid great swings in general diet. Avoid alcohol consumption. Advised to notify the office if antibiotic or steroid therapy is initiated. Call if any signs of abnormal bleeding are present. Next PT/INR test Monday.

## 2021-08-30 ENCOUNTER — ANTI-COAG VISIT (OUTPATIENT)
Dept: CARDIOLOGY CLINIC | Age: 71
End: 2021-08-30
Payer: MEDICARE

## 2021-08-30 DIAGNOSIS — Z79.01 LONG TERM (CURRENT) USE OF ANTICOAGULANTS: Primary | ICD-10-CM

## 2021-08-30 DIAGNOSIS — I48.0 PAROXYSMAL ATRIAL FIBRILLATION (HCC): ICD-10-CM

## 2021-08-30 LAB
INR BLD: 1.8 (ref 1–1.5)
PT POC: 21.3 SECONDS (ref 9.1–12)
VALID INTERNAL CONTROL?: YES

## 2021-08-30 PROCEDURE — 85610 PROTHROMBIN TIME: CPT | Performed by: INTERNAL MEDICINE

## 2021-09-07 ENCOUNTER — ANTI-COAG VISIT (OUTPATIENT)
Dept: CARDIOLOGY CLINIC | Age: 71
End: 2021-09-07
Payer: MEDICARE

## 2021-09-07 DIAGNOSIS — I48.0 PAROXYSMAL ATRIAL FIBRILLATION (HCC): ICD-10-CM

## 2021-09-07 DIAGNOSIS — Z79.01 LONG TERM (CURRENT) USE OF ANTICOAGULANTS: Primary | ICD-10-CM

## 2021-09-07 LAB
INR BLD: 2.6 (ref 1–1.5)
PT POC: 30.7 SECONDS (ref 9.1–12)
VALID INTERNAL CONTROL?: YES

## 2021-09-07 PROCEDURE — 85610 PROTHROMBIN TIME: CPT | Performed by: INTERNAL MEDICINE

## 2021-09-22 ENCOUNTER — OFFICE VISIT (OUTPATIENT)
Dept: CARDIOLOGY CLINIC | Age: 71
End: 2021-09-22
Payer: MEDICARE

## 2021-09-22 ENCOUNTER — ANTI-COAG VISIT (OUTPATIENT)
Dept: CARDIOLOGY CLINIC | Age: 71
End: 2021-09-22

## 2021-09-22 ENCOUNTER — OFFICE VISIT (OUTPATIENT)
Dept: CARDIOLOGY CLINIC | Age: 71
End: 2021-09-22

## 2021-09-22 VITALS
WEIGHT: 219 LBS | BODY MASS INDEX: 32.44 KG/M2 | SYSTOLIC BLOOD PRESSURE: 114 MMHG | HEIGHT: 69 IN | DIASTOLIC BLOOD PRESSURE: 84 MMHG | OXYGEN SATURATION: 96 % | HEART RATE: 74 BPM | RESPIRATION RATE: 16 BRPM

## 2021-09-22 DIAGNOSIS — Z79.01 LONG TERM (CURRENT) USE OF ANTICOAGULANTS: ICD-10-CM

## 2021-09-22 DIAGNOSIS — Z79.01 LONG TERM (CURRENT) USE OF ANTICOAGULANTS: Primary | ICD-10-CM

## 2021-09-22 DIAGNOSIS — I48.0 PAROXYSMAL ATRIAL FIBRILLATION (HCC): ICD-10-CM

## 2021-09-22 DIAGNOSIS — Z95.810 ICD (IMPLANTABLE CARDIOVERTER-DEFIBRILLATOR) IN PLACE: ICD-10-CM

## 2021-09-22 DIAGNOSIS — I42.8 NONISCHEMIC CARDIOMYOPATHY (HCC): Primary | ICD-10-CM

## 2021-09-22 DIAGNOSIS — I25.5 CARDIOMYOPATHY, ISCHEMIC: ICD-10-CM

## 2021-09-22 DIAGNOSIS — Z95.810 ICD (IMPLANTABLE CARDIOVERTER-DEFIBRILLATOR) IN PLACE: Primary | ICD-10-CM

## 2021-09-22 DIAGNOSIS — I42.8 NONISCHEMIC CARDIOMYOPATHY (HCC): ICD-10-CM

## 2021-09-22 LAB
INR BLD: 2 (ref 1–1.5)
PT POC: 23.6 SECONDS (ref 9.1–12)
VALID INTERNAL CONTROL?: YES

## 2021-09-22 PROCEDURE — 85610 PROTHROMBIN TIME: CPT | Performed by: INTERNAL MEDICINE

## 2021-09-22 PROCEDURE — G8754 DIAS BP LESS 90: HCPCS | Performed by: NURSE PRACTITIONER

## 2021-09-22 PROCEDURE — 93284 PRGRMG EVAL IMPLANTABLE DFB: CPT | Performed by: INTERNAL MEDICINE

## 2021-09-22 PROCEDURE — G8432 DEP SCR NOT DOC, RNG: HCPCS | Performed by: NURSE PRACTITIONER

## 2021-09-22 PROCEDURE — G8427 DOCREV CUR MEDS BY ELIG CLIN: HCPCS | Performed by: NURSE PRACTITIONER

## 2021-09-22 PROCEDURE — 99214 OFFICE O/P EST MOD 30 MIN: CPT | Performed by: NURSE PRACTITIONER

## 2021-09-22 PROCEDURE — 1101F PT FALLS ASSESS-DOCD LE1/YR: CPT | Performed by: NURSE PRACTITIONER

## 2021-09-22 PROCEDURE — 3017F COLORECTAL CA SCREEN DOC REV: CPT | Performed by: NURSE PRACTITIONER

## 2021-09-22 PROCEDURE — G8417 CALC BMI ABV UP PARAM F/U: HCPCS | Performed by: NURSE PRACTITIONER

## 2021-09-22 PROCEDURE — G8752 SYS BP LESS 140: HCPCS | Performed by: NURSE PRACTITIONER

## 2021-09-22 PROCEDURE — G8536 NO DOC ELDER MAL SCRN: HCPCS | Performed by: NURSE PRACTITIONER

## 2021-09-22 NOTE — PROGRESS NOTES
1. Have you been to the ER, urgent care clinic since your last visit? Hospitalized since your last visit? No.    2. Have you seen or consulted any other health care providers outside of the [address] [place] [address] since your last visit? Include any pap smears or colon screening. No.    PT HAD A FALL JUST OUTSIDE THE FRONT DOOR, HE TRIPPED OVER SOMETHING. HE BUMPED HIS LIPS AND SKINNED HIS KNEES. BUT NO COMPLAINTS OR DIZZINESS.     Chief Complaint   Patient presents with    Irregular Heart Beat     6 month- device check- pt denies any cardiac symptoms    Cardiomyopathy

## 2021-09-22 NOTE — PROGRESS NOTES
ELECTROPHYSIOLOGY        Subjective:      Mario Marcos is a 70 y.o. male is here for EP follow up. The patient denies chest pain/ shortness of breath, orthopnea, PND, LE edema, palpitations, syncope, presyncope or fatigue. Mario Marcos  is on [address] [place] [address] Road, reports no melena, hematuria, or obvious signs of bleeding. Whit Hamming today in our parking lot. Pt reports it was a mechanical fall; toe caught the curb. He went down on his knees. Denies any other falls.      Patient Active Problem List    Diagnosis Date Noted    S/P CABG x 5 08/20/2020    Biventricular ICD (implantable cardioverter-defibrillator) in place 08/20/2020    S/P cardiac cath 02/08/2019    Nonischemic cardiomyopathy ([facility] 02/08/2019    Cardiomyopathy, ischemic 02/08/2019    Syncope 12/21/2018    ASHD (arteriosclerotic heart disease) 12/12/8524    Systolic CHF, chronic ([facility] 09/17/2013    Essential hypertension, benign 09/17/2013    Mixed hyperlipidemia 09/17/2013      Stephany Walden MD  Past Medical History:   Diagnosis Date    Atrial fibrillation [facility])     CAD (coronary artery disease)     Chronic systolic HF (heart failure) ([facility]     Congestive heart failure ([facility]     DM type 2 (diabetes mellitus, type 2) ([facility]     Gout     HTN (hypertension)     Hypercholesterolemia     ICD (implantable cardioverter-defibrillator) in place     Nonischemic cardiomyopathy ([facility] 2/8/2019    S/P cardiac cath 2/8/2019 2/8/19 cardiac cath with nonobstructive disease    Stroke [facility])     tia IN 2019      Past Surgical History:   Procedure Laterality Date    HX CORONARY ARTERY BYPASS GRAFT  2005    HX PACEMAKER Left 03/08/2019    ICD    [place] CARDIAC SURG PROCEDURE UNLIST      [place] INSJ/RPLCMT PERM DFB W/TRNSVNS LDS 1/DUAL CHMBR N/A 3/8/2019    INSERT ICD BIV MULTI performed by Dorie Cotto MD at [facility] CARDIAC CATH LAB     No Known Allergies   Family History   Problem Relation Age of Onset    Heart Disease Father     Heart Attack Father    Ori.Leydi Hypertension Brother     negative for cardiac disease  Social History     Socioeconomic History    Marital status:      Spouse name: Not on file    Number of children: Not on file    Years of education: Not on file    Highest education level: Not on file   Tobacco Use    Smoking status: Current Every Day Smoker     Packs/day: 0.25     Years: 40.00     Pack years: 10.00     Types: Cigarettes    Smokeless tobacco: Never Used    Tobacco comment: 4-8 cigarettes a day   Vaping Use    Vaping Use: Never used   Substance and Sexual Activity    Alcohol use: Not Currently     Comment: NONE IN 3 MONTHS OR SO    Drug use: No     Social Determinants of Health     Financial Resource Strain:     Difficulty of Paying Living Expenses:    Food Insecurity:     Worried About Running Out of Food in the Last Year:     Ran Out of Food in the Last Year:    Transportation Needs:     Lack of Transportation (Medical):  Lack of Transportation (Non-Medical):    Physical Activity:     Days of Exercise per Week:     Minutes of Exercise per Session:    Stress:     Feeling of Stress :    Social Connections:     Frequency of Communication with Friends and Family:     Frequency of Social Gatherings with Friends and Family:     Attends Hinduism Services:     Active Member of Clubs or Organizations:     Attends Club or Organization Meetings:     Marital Status:      Current Outpatient Medications   Medication Sig    warfarin (COUMADIN) 5 mg tablet TAKE 1 AND 1/2 TABLET ON FRIDAYS AND 1 TABLET ALL OTHER DAYS.  atorvastatin (LIPITOR) 10 mg tablet TAKE 1 TABLET EVERY DAY    metFORMIN ER (GLUCOPHAGE XR) 750 mg tablet Take 750 mg by mouth three (3) times daily.  glimepiride (AMARYL) 2 mg tablet Take 2 mg by mouth every morning.  ENTRESTO 24-26 mg tablet TAKE 1 TABLET BY MOUTH TWICE A DAY    ACCU-CHEK DAVE PLUS METER mis USE TO TEST BLOOD SUGAR    multivitamin (ONE A DAY) tablet Take 1 Tab by mouth daily.     aspirin delayed-release 81 mg tablet Take 81 mg by mouth daily.  carvedilol (COREG) 25 mg tablet Take 25 mg by mouth two (2) times daily (with meals).  potassium chloride (KLOR-CON M10) 10 mEq tablet Take 10 mEq by mouth two (2) times a day.  furosemide (LASIX) 20 mg tablet Take 20 mg by mouth daily.  amLODIPine (NORVASC) 5 mg tablet Take 5 mg by mouth daily. No current facility-administered medications for this visit. Vitals:    21 0831   BP: 114/84   Pulse: 74   Resp: 16   SpO2: 96%   Weight: 219 lb (99.3 kg)   Height: 5' 9\" (1.753 m)       I have reviewed the nurses notes, vitals, problem list, allergy list, medical history, family, social history and medications. Review of Symptoms:  11 systems reviewed, negative other than as stated in the HPI      Physical Exam:      General: Well developed, in no acute distress. HEENT: Eyes - PERRL  Heart:  Normal S1/S2 negative S3 or S4. Regular, no murmur  Respiratory: Clear bilaterally x 4, no wheezing or rales  Extremities:  No edema, no cyanosis. Musculoskeletal: No clubbing  Neuro: A&Ox3, speech clear  Skin: No visible rashes or lesions. Non diaphoretic.  No visible ulcers  Vascular: 2+ pulses symmetric in all extremities  Psych - judgement intact and orientation is wnl       Cardiographics    Ek21  V paced    Results for orders placed or performed during the hospital encounter of 19   EKG, 12 LEAD, INITIAL   Result Value Ref Range    Ventricular Rate 71 BPM    Atrial Rate 71 BPM    P-R Interval 146 ms    QRS Duration 132 ms    Q-T Interval 440 ms    QTC Calculation (Bezet) 478 ms    Calculated P Axis 36 degrees    Calculated R Axis 164 degrees    Calculated T Axis -35 degrees    Diagnosis       Electronic ventricular pacemaker  When compared with ECG of 21-DEC-2018 11:39,  Electronic ventricular pacemaker has replaced Sinus rhythm  Confirmed by Zeferino Geronimo ([id number]) on 3/11/2019 9:23:53 AM           Lab Results Component Value Date/Time    WBC 4.3 02/28/2019 03:44 PM    HGB 15.5 02/28/2019 03:44 PM    HCT 45.4 02/28/2019 03:44 PM    PLATELET 375 (L) 44/32/8194 03:44 PM    MCV 84 02/28/2019 03:44 PM      Lab Results   Component Value Date/Time    Sodium 145 (H) 02/28/2019 03:44 PM    Potassium 4.0 02/28/2019 03:44 PM    Chloride 104 02/28/2019 03:44 PM    CO2 24 02/28/2019 03:44 PM    Anion gap 7 12/23/2018 04:52 AM    Glucose 139 (H) 02/28/2019 03:44 PM    BUN 11 02/28/2019 03:44 PM    Creatinine 1.02 02/28/2019 03:44 PM    BUN/Creatinine ratio 11 02/28/2019 03:44 PM    GFR est AA 87 02/28/2019 03:44 PM    GFR est non-AA 75 02/28/2019 03:44 PM    Calcium 9.2 02/28/2019 03:44 PM    Bilirubin, total 0.3 02/28/2019 03:44 PM    Alk. phosphatase 84 02/28/2019 03:44 PM    Protein, total 6.5 02/28/2019 03:44 PM    Albumin 4.1 02/28/2019 03:44 PM    Globulin 3.6 12/21/2018 11:48 AM    A-G Ratio 1.7 02/28/2019 03:44 PM    ALT (SGPT) 19 02/28/2019 03:44 PM      Lab Results   Component Value Date/Time    TSH 0.61 12/21/2018 06:45 PM      Echo:  · LV: Estimated LVEF is 30 - 35%. Mildly dilated left ventricle. Mild septal wall hypertrophy. Severely reduced systolic function. Severe hypokinesis of the basal inferoseptal wall(s). Akinesis of the basal inferolateral, mid inferolateral, basal inferior and mid inferior wall(s). Age-appropriate left ventricular diastolic function. · AV: Focal aortic valve leaflet calcification present. Assessment:           ICD-10-CM ICD-9-CM    1. Nonischemic cardiomyopathy (HCC)  I42.8 425.4 AMB POC EKG ROUTINE W/ 12 LEADS, INTER & REP   2. Paroxysmal atrial fibrillation (HCC)  I48.0 427.31 AMB POC EKG ROUTINE W/ 12 LEADS, INTER & REP   3. Cardiomyopathy, ischemic  I25.5 414.8 AMB POC EKG ROUTINE W/ 12 LEADS, INTER & REP   4. Long term (current) use of anticoagulants  Z79.01 V58.61 AMB POC EKG ROUTINE W/ 12 LEADS, INTER & REP   5.  ICD (implantable cardioverter-defibrillator) in place  Z95.810 V45.02 AMB POC EKG ROUTINE W/ 12 LEADS, INTER & REP     Orders Placed This Encounter    AMB POC EKG ROUTINE W/ 12 LEADS, INTER & REP     Order Specific Question:   Reason for Exam:     Answer:   routine        Plan:     Mr Sybil Hernandez is here for annual follow up for BIV ICD implantation. Device interrogation demonstrates normal functioning. He is 24% AP and only LV paced 96 %. Battery life remaining is 11 years. One NSVT noted. He denies cardiac complaints. He has AF <1% of the time on [address] [place] [address] Road. Heartlogic 16 (threshold is 25). PER LHC 2/19, EF = 15 - 20%. Echo 1/21 with EF 30-35%. EKG V paced, normotensive. During this visit,  the patient and I had a comprehensive discussion of device management using principles of shared decision making. we reviewed device therapy, including the potential risks and benefits of device management. These risks include death, myocardial infarction, stroke, cardiac perforation, vascular injury, injury, pacing induced cardiomyopathy, inappropriate shocks (defibrillator) and other less severe complications. The patient demonstrated a clear understanding of these issues during out discussion. Our plans, determined together after thorough consideration, are outlined else where in this note. Addressed all patient questions and concerns at this visit. Continue medical management for cardiomyopathy. Discussed side effects, efficacy and good medication compliance with pt re: coreg and entresto. Thank you for allowing me to participate in Dolores Childress Chema Poncho 's care.       Keo Greenwood NP

## 2021-09-29 ENCOUNTER — TELEPHONE (OUTPATIENT)
Dept: CARDIOLOGY CLINIC | Age: 71
End: 2021-09-29

## 2021-09-29 NOTE — TELEPHONE ENCOUNTER
Spoke with patient. Verified patient with two patient identifiers. States he is fine s/p fall. States he has no injuries, no pains, etc from fall. Wished him well. Patient verbalized understanding.

## 2021-10-13 ENCOUNTER — ANTI-COAG VISIT (OUTPATIENT)
Dept: CARDIOLOGY CLINIC | Age: 71
End: 2021-10-13
Payer: MEDICARE

## 2021-10-13 DIAGNOSIS — I48.0 PAROXYSMAL ATRIAL FIBRILLATION (HCC): ICD-10-CM

## 2021-10-13 DIAGNOSIS — Z79.01 LONG TERM (CURRENT) USE OF ANTICOAGULANTS: Primary | ICD-10-CM

## 2021-10-13 LAB
INR BLD: 2.4 (ref 1–1.5)
PT POC: 28.3 SECONDS (ref 9.1–12)
VALID INTERNAL CONTROL?: YES

## 2021-10-13 PROCEDURE — 85610 PROTHROMBIN TIME: CPT | Performed by: INTERNAL MEDICINE

## 2021-11-10 ENCOUNTER — ANTI-COAG VISIT (OUTPATIENT)
Dept: CARDIOLOGY CLINIC | Age: 71
End: 2021-11-10
Payer: MEDICARE

## 2021-11-10 DIAGNOSIS — I48.0 PAROXYSMAL ATRIAL FIBRILLATION (HCC): ICD-10-CM

## 2021-11-10 DIAGNOSIS — Z79.01 LONG TERM (CURRENT) USE OF ANTICOAGULANTS: Primary | ICD-10-CM

## 2021-11-10 LAB
INR BLD: 2.7 (ref 1–1.5)
PT POC: 32 SECONDS (ref 9.1–12)
VALID INTERNAL CONTROL?: YES

## 2021-11-10 PROCEDURE — 85610 PROTHROMBIN TIME: CPT | Performed by: INTERNAL MEDICINE

## 2021-11-24 ENCOUNTER — OFFICE VISIT (OUTPATIENT)
Dept: CARDIOLOGY CLINIC | Age: 71
End: 2021-11-24

## 2021-11-24 DIAGNOSIS — I48.0 PAROXYSMAL ATRIAL FIBRILLATION (HCC): ICD-10-CM

## 2021-11-24 DIAGNOSIS — I42.8 NONISCHEMIC CARDIOMYOPATHY (HCC): ICD-10-CM

## 2021-11-24 DIAGNOSIS — I50.22 SYSTOLIC CHF, CHRONIC (HCC): ICD-10-CM

## 2021-11-24 DIAGNOSIS — Z95.810 ICD (IMPLANTABLE CARDIOVERTER-DEFIBRILLATOR) IN PLACE: Primary | ICD-10-CM

## 2021-11-24 DIAGNOSIS — I25.5 CARDIOMYOPATHY, ISCHEMIC: ICD-10-CM

## 2021-12-08 ENCOUNTER — ANTI-COAG VISIT (OUTPATIENT)
Dept: CARDIOLOGY CLINIC | Age: 71
End: 2021-12-08
Payer: MEDICARE

## 2021-12-08 DIAGNOSIS — I48.0 PAROXYSMAL ATRIAL FIBRILLATION (HCC): ICD-10-CM

## 2021-12-08 DIAGNOSIS — Z79.01 LONG TERM (CURRENT) USE OF ANTICOAGULANTS: Primary | ICD-10-CM

## 2021-12-08 LAB
INR BLD: 2 (ref 1–1.5)
PT POC: 24 SECONDS (ref 9.1–12)
VALID INTERNAL CONTROL?: YES

## 2021-12-08 PROCEDURE — 85610 PROTHROMBIN TIME: CPT | Performed by: INTERNAL MEDICINE

## 2021-12-23 ENCOUNTER — OFFICE VISIT (OUTPATIENT)
Dept: CARDIOLOGY CLINIC | Age: 71
End: 2021-12-23
Payer: MEDICARE

## 2021-12-23 DIAGNOSIS — I50.22 SYSTOLIC CHF, CHRONIC (HCC): ICD-10-CM

## 2021-12-23 DIAGNOSIS — I25.5 CARDIOMYOPATHY, ISCHEMIC: ICD-10-CM

## 2021-12-23 DIAGNOSIS — Z95.810 ICD (IMPLANTABLE CARDIOVERTER-DEFIBRILLATOR) IN PLACE: Primary | ICD-10-CM

## 2021-12-23 PROCEDURE — 93295 DEV INTERROG REMOTE 1/2/MLT: CPT | Performed by: INTERNAL MEDICINE

## 2021-12-23 PROCEDURE — 93296 REM INTERROG EVL PM/IDS: CPT | Performed by: INTERNAL MEDICINE

## 2021-12-29 ENCOUNTER — TELEPHONE (OUTPATIENT)
Dept: CARDIOLOGY CLINIC | Age: 71
End: 2021-12-29

## 2021-12-29 NOTE — TELEPHONE ENCOUNTER
LM for pt to call back to make appt with Dr. Willy Umana following some AF seen on his remote monitor, and to get the stress test results (scheduled 1/5/21)

## 2021-12-30 NOTE — TELEPHONE ENCOUNTER
Spoke with pt's wife (pt in bed still) made her aware of Dr. Danell Dance wanting to discuss the AF.

## 2022-01-05 ENCOUNTER — TELEPHONE (OUTPATIENT)
Dept: CARDIOLOGY CLINIC | Age: 72
End: 2022-01-05

## 2022-01-05 ENCOUNTER — ANCILLARY PROCEDURE (OUTPATIENT)
Dept: CARDIOLOGY CLINIC | Age: 72
End: 2022-01-05
Payer: MEDICARE

## 2022-01-05 ENCOUNTER — ANTI-COAG VISIT (OUTPATIENT)
Dept: CARDIOLOGY CLINIC | Age: 72
End: 2022-01-05

## 2022-01-05 VITALS
DIASTOLIC BLOOD PRESSURE: 80 MMHG | BODY MASS INDEX: 32.73 KG/M2 | WEIGHT: 221 LBS | HEIGHT: 69 IN | SYSTOLIC BLOOD PRESSURE: 125 MMHG

## 2022-01-05 DIAGNOSIS — I48.0 PAROXYSMAL ATRIAL FIBRILLATION (HCC): ICD-10-CM

## 2022-01-05 DIAGNOSIS — Z95.810 ICD (IMPLANTABLE CARDIOVERTER-DEFIBRILLATOR) IN PLACE: ICD-10-CM

## 2022-01-05 DIAGNOSIS — I25.5 CARDIOMYOPATHY, ISCHEMIC: ICD-10-CM

## 2022-01-05 DIAGNOSIS — I42.8 NONISCHEMIC CARDIOMYOPATHY (HCC): ICD-10-CM

## 2022-01-05 DIAGNOSIS — Z79.01 LONG TERM (CURRENT) USE OF ANTICOAGULANTS: Primary | ICD-10-CM

## 2022-01-05 DIAGNOSIS — I50.22 SYSTOLIC CHF, CHRONIC (HCC): ICD-10-CM

## 2022-01-05 LAB
INR BLD: 1.7 (ref 1–1.5)
NUC STRESS EJECTION FRACTION: 33 %
PT POC: 20.9 SECONDS (ref 9.1–12)
STRESS BASELINE DIAS BP: 80 MMHG
STRESS BASELINE HR: 65 BPM
STRESS BASELINE ST DEPRESSION: 0 MM
STRESS BASELINE SYS BP: 125 MMHG
STRESS PEAK DIAS BP: 80 MMHG
STRESS PEAK SYS BP: 125 MMHG
STRESS PERCENT HR ACHIEVED: 56 %
STRESS POST PEAK HR: 84 BPM
STRESS RATE PRESSURE PRODUCT: NORMAL BPM*MMHG
STRESS ST DEPRESSION: 0 MM
STRESS TARGET HR: 149 BPM
VALID INTERNAL CONTROL?: YES

## 2022-01-05 PROCEDURE — 93015 CV STRESS TEST SUPVJ I&R: CPT | Performed by: INTERNAL MEDICINE

## 2022-01-05 PROCEDURE — 78452 HT MUSCLE IMAGE SPECT MULT: CPT | Performed by: INTERNAL MEDICINE

## 2022-01-05 PROCEDURE — A9502 TC99M TETROFOSMIN: HCPCS | Performed by: INTERNAL MEDICINE

## 2022-01-05 PROCEDURE — 85610 PROTHROMBIN TIME: CPT | Performed by: INTERNAL MEDICINE

## 2022-01-05 NOTE — PROGRESS NOTES
Stress test is not showing indications of blood flow limitation to the heart that needs further investigation at this time. Needs an appt with Dr Darcy Becker to discuss device interrogation results.

## 2022-01-05 NOTE — TELEPHONE ENCOUNTER
Verified patient with 2 identifiers   Spoke with patient regarding results and recommendations. Patient voiced understanding.    Please call patient on home phone [phone] to schedule f/u with Dr Raymundo Don.

## 2022-01-05 NOTE — TELEPHONE ENCOUNTER
----- Message from Av Laguna NP sent at 1/5/2022 12:50 PM EST -----  Stress test is not showing indications of blood flow limitation to the heart that needs further investigation at this time. Needs an appt with Dr Brittany Dhaliwal to discuss device interrogation results.

## 2022-01-11 ENCOUNTER — ANTI-COAG VISIT (OUTPATIENT)
Dept: CARDIOLOGY CLINIC | Age: 72
End: 2022-01-11
Payer: MEDICARE

## 2022-01-11 ENCOUNTER — OFFICE VISIT (OUTPATIENT)
Dept: CARDIOLOGY CLINIC | Age: 72
End: 2022-01-11

## 2022-01-11 VITALS
RESPIRATION RATE: 18 BRPM | BODY MASS INDEX: 31.92 KG/M2 | OXYGEN SATURATION: 98 % | DIASTOLIC BLOOD PRESSURE: 84 MMHG | HEART RATE: 42 BPM | WEIGHT: 215.5 LBS | HEIGHT: 69 IN | SYSTOLIC BLOOD PRESSURE: 122 MMHG

## 2022-01-11 DIAGNOSIS — E78.2 MIXED HYPERLIPIDEMIA: ICD-10-CM

## 2022-01-11 DIAGNOSIS — I48.0 PAROXYSMAL ATRIAL FIBRILLATION (HCC): ICD-10-CM

## 2022-01-11 DIAGNOSIS — I25.5 CARDIOMYOPATHY, ISCHEMIC: ICD-10-CM

## 2022-01-11 DIAGNOSIS — I10 ESSENTIAL HYPERTENSION, BENIGN: Primary | ICD-10-CM

## 2022-01-11 DIAGNOSIS — Z79.01 LONG TERM (CURRENT) USE OF ANTICOAGULANTS: Primary | ICD-10-CM

## 2022-01-11 LAB
INR BLD: 2.9 (ref 1–1.5)
PT POC: 35.3 SECONDS (ref 9.1–12)
VALID INTERNAL CONTROL?: YES

## 2022-01-11 PROCEDURE — G8754 DIAS BP LESS 90: HCPCS | Performed by: INTERNAL MEDICINE

## 2022-01-11 PROCEDURE — 1101F PT FALLS ASSESS-DOCD LE1/YR: CPT | Performed by: INTERNAL MEDICINE

## 2022-01-11 PROCEDURE — G8536 NO DOC ELDER MAL SCRN: HCPCS | Performed by: INTERNAL MEDICINE

## 2022-01-11 PROCEDURE — 85610 PROTHROMBIN TIME: CPT | Performed by: INTERNAL MEDICINE

## 2022-01-11 PROCEDURE — G8427 DOCREV CUR MEDS BY ELIG CLIN: HCPCS | Performed by: INTERNAL MEDICINE

## 2022-01-11 PROCEDURE — G8417 CALC BMI ABV UP PARAM F/U: HCPCS | Performed by: INTERNAL MEDICINE

## 2022-01-11 PROCEDURE — G8752 SYS BP LESS 140: HCPCS | Performed by: INTERNAL MEDICINE

## 2022-01-11 PROCEDURE — G8510 SCR DEP NEG, NO PLAN REQD: HCPCS | Performed by: INTERNAL MEDICINE

## 2022-01-11 PROCEDURE — 99215 OFFICE O/P EST HI 40 MIN: CPT | Performed by: INTERNAL MEDICINE

## 2022-01-11 PROCEDURE — 3017F COLORECTAL CA SCREEN DOC REV: CPT | Performed by: INTERNAL MEDICINE

## 2022-01-11 RX ORDER — METFORMIN HYDROCHLORIDE 500 MG/1
TABLET, EXTENDED RELEASE ORAL
COMMUNITY
Start: 2021-12-23

## 2022-01-11 NOTE — PROGRESS NOTES
Subjective:      Alta Magaña is a 70 y.o. male is here for EP consult.   He has episodes of palpitations    Patient Active Problem List    Diagnosis Date Noted    S/P CABG x 5 08/20/2020    Biventricular ICD (implantable cardioverter-defibrillator) in place 08/20/2020    S/P cardiac cath 02/08/2019    Nonischemic cardiomyopathy ([facility] 02/08/2019    Cardiomyopathy, ischemic 02/08/2019    Syncope 12/21/2018    ASHD (arteriosclerotic heart disease) 65/45/9697    Systolic CHF, chronic ([facility] 09/17/2013    Essential hypertension, benign 09/17/2013    Mixed hyperlipidemia 09/17/2013      Neo Duran MD  Past Medical History:   Diagnosis Date    Atrial fibrillation [facility])     CAD (coronary artery disease)     Chronic systolic HF (heart failure) (HCC)     Congestive heart failure ([facility]     DM type 2 (diabetes mellitus[facility] type 2) ([facility]     Gout     HTN (hypertension)     Hypercholesterolemia     ICD (implantable cardioverter-defibrillator) in place     [facility] 2/8/2019    S/P cardiac cath 2/8/2019 2/8/19 cardiac cath with nonobstructive disease    Stroke [facility])     tia IN 2019    Syncope       Past Surgical History:   Procedure Laterality Date    HX CORONARY ARTERY BYPASS GRAFT  2005    HX PACEMAKER Left 03/08/2019    ICD    [place] CARDIAC SURG PROCEDURE UNLIST      [place] INSJ/RPLCMT PERM DFB W/TRNSVNS LDS 1/DUAL CHMBR N/A 3/8/2019    INSERT ICD BIV MULTI performed by Vern Shirley MD at \[facility]\"" CARDIAC CATH LAB     No Known Allergies   Family History   Problem Relation Age of Onset    Heart Disease Father     Heart Attack Father     Hypertension Brother     negative for cardiac disease  Social History     Socioeconomic History    Marital status:    Tobacco Use    Smoking status: Current Every Day Smoker     Packs/day: 0.25     Years: 40.00     Pack years: 10.00     Types: Cigarettes    Smokeless tobacco: Never Used    Tobacco comment: 4-8 cigarettes a day Vaping Use    Vaping Use: Never used   Substance and Sexual Activity    Alcohol use: Not Currently     Comment: NONE IN 3 MONTHS OR SO    Drug use: No     Current Outpatient Medications   Medication Sig    metFORMIN ER (GLUCOPHAGE XR) 500 mg tablet Take  by mouth. 3 tabs at night    warfarin (COUMADIN) 5 mg tablet TAKE 1 AND 1/2 TABLET ON FRIDAYS AND 1 TABLET ALL OTHER DAYS.  atorvastatin (LIPITOR) 10 mg tablet TAKE 1 TABLET EVERY DAY    glimepiride (AMARYL) 2 mg tablet Take 2 mg by mouth every morning.  ENTRESTO 24-26 mg tablet TAKE 1 TABLET BY MOUTH TWICE A DAY    ACCU-CHEK DAVE PLUS METER misc USE TO TEST BLOOD SUGAR    multivitamin (ONE A DAY) tablet Take 1 Tab by mouth daily.  aspirin delayed-release 81 mg tablet Take 81 mg by mouth daily.  carvedilol (COREG) 25 mg tablet Take 25 mg by mouth two (2) times daily (with meals).  potassium chloride (KLOR-CON M10) 10 mEq tablet Take 10 mEq by mouth two (2) times a day.  furosemide (LASIX) 20 mg tablet Take 20 mg by mouth daily.  amLODIPine (NORVASC) 5 mg tablet Take 5 mg by mouth daily. No current facility-administered medications for this visit. Vitals:    01/11/22 1343   BP: 122/84   Pulse: (!) 42   Resp: 18   SpO2: 98%   Weight: 215 lb 8 oz (97.8 kg)   Height: 5' 9\" (1.753 m)       I have reviewed the nurses notes, vitals, problem list, allergy list, medical history, family, social history and medications. Review of Symptoms:    General: Pt denies excessive weight gain or loss. Pt is able to conduct ADL's  HEENT: Denies blurred vision, headaches, hearing loss, epistaxis and difficulty swallowing. Respiratory: Denies cough, congestion, shortness of breath, TO, wheezing or stridor.   Cardiovascular: +palpitations,  Denies precordial pain,  edema or PND  Gastrointestinal: Denies poor appetite, indigestion, abdominal pain or blood in stool  Genitourinary: Denies hematuria, dysuria, increased urinary frequency  Musculoskeletal: Denies joint pain or swelling from muscles or joints  Neurologic: Denies tremor, paresthesias, headache, or sensory motor disturbance  Psychiatric: Denies confusion, insomnia, depression  Integumentray: Denies rash, itching or ulcers. Hematologic: Denies easy bruising, bleeding    Physical Exam:      General: Well developed, in no acute distress. HEENT: Eyes - PERRL, no jvd  Heart:  Normal S1/S2 negative S3 or S4. Regular, no murmur, gallop or rub. Respiratory: Clear bilaterally x 4, no wheezing or rales  Abdomen:   Soft, non-tender, bowel sounds are active. Extremities:  No edema, normal cap refill, no cyanosis. Musculoskeletal: No clubbing  Neuro: A&Ox3, speech clear, gait stable. Skin: Skin color is normal. No rashes or lesions.  Non diaphoretic, no ulcers or subcutaneous nodule  Vascular: 2+ pulses symmetric in all extremities  Psych - judgement intact and orientation is wnl     Cardiographics    icd - PAF noted    Stress - infarct, no ischemia, ef 35%    Results for orders placed or performed during the hospital encounter of 03/08/19   EKG, 12 LEAD, INITIAL   Result Value Ref Range    Ventricular Rate 71 BPM    Atrial Rate 71 BPM    P-R Interval 146 ms    QRS Duration 132 ms    Q-T Interval 440 ms    QTC Calculation (Bezet) 478 ms    Calculated P Axis 36 degrees    Calculated R Axis 164 degrees    Calculated T Axis -35 degrees    Diagnosis       Electronic ventricular pacemaker  When compared with ECG of 21-DEC-2018 11:39,  Electronic ventricular pacemaker has replaced Sinus rhythm  Confirmed by Alvera Cranker ([id number]) on 3/11/2019 9:23:53 AM           Lab Results   Component Value Date/Time    WBC 4.3 02/28/2019 03:44 PM    HGB 15.5 02/28/2019 03:44 PM    HCT 45.4 02/28/2019 03:44 PM    PLATELET 739 (L) 65/26/0948 03:44 PM    MCV 84 02/28/2019 03:44 PM      Lab Results   Component Value Date/Time    Sodium 145 (H) 02/28/2019 03:44 PM    Potassium 4.0 02/28/2019 03:44 PM Chloride 104 02/28/2019 03:44 PM    CO2 24 02/28/2019 03:44 PM    Anion gap 7 12/23/2018 04:52 AM    Glucose 139 (H) 02/28/2019 03:44 PM    BUN 11 02/28/2019 03:44 PM    Creatinine 1.02 02/28/2019 03:44 PM    BUN/Creatinine ratio 11 02/28/2019 03:44 PM    GFR est AA 87 02/28/2019 03:44 PM    GFR est non-AA 75 02/28/2019 03:44 PM    Calcium 9.2 02/28/2019 03:44 PM    Bilirubin, total 0.3 02/28/2019 03:44 PM    Alk. phosphatase 84 02/28/2019 03:44 PM    Protein, total 6.5 02/28/2019 03:44 PM    Albumin 4.1 02/28/2019 03:44 PM    Globulin 3.6 12/21/2018 11:48 AM    A-G Ratio 1.7 02/28/2019 03:44 PM    ALT (SGPT) 19 02/28/2019 03:44 PM         Assessment:     Assessment:        ICD-10-CM ICD-9-CM    1. Essential hypertension, benign  I10 401.1 CBC WITH AUTOMATED DIFF      PROTHROMBIN TIME + INR      METABOLIC PANEL, COMPREHENSIVE      XR CHEST PA LAT   2. Mixed hyperlipidemia  E78.2 272.2 CBC WITH AUTOMATED DIFF      PROTHROMBIN TIME + INR      METABOLIC PANEL, COMPREHENSIVE      XR CHEST PA LAT   3. Cardiomyopathy, ischemic  I25.5 414.8 CBC WITH AUTOMATED DIFF      PROTHROMBIN TIME + INR      METABOLIC PANEL, COMPREHENSIVE      XR CHEST PA LAT   4. Paroxysmal atrial fibrillation (HCC)  I48.0 427.31 CBC WITH AUTOMATED DIFF      PROTHROMBIN TIME + INR      METABOLIC PANEL, COMPREHENSIVE      XR CHEST PA LAT     Orders Placed This Encounter    XR CHEST PA LAT     Standing Status:   Future     Standing Expiration Date:   2/11/2023     Order Specific Question:   Reason for Exam     Answer:   pre op    CBC WITH AUTOMATED DIFF     Standing Status:   Future     Standing Expiration Date:   7/11/2022    PROTHROMBIN TIME + INR     Standing Status:   Future     Standing Expiration Date:   5/45/8034    METABOLIC PANEL, COMPREHENSIVE     Standing Status:   Future     Standing Expiration Date:   7/11/2022    metFORMIN ER (GLUCOPHAGE XR) 500 mg tablet     Sig: Take  by mouth.  3 tabs at night        Plan:   Sunday Iron is having symptomatic PAF. He is stable and compliant with oac for elevated chadsvasc. Hoda Guardado is a candidate for an afib ablation. I discussed the risks/benefits/alternatives of the procedure with the patient. Risks include (but are not limited to) bleeding, heart block, infection, cva/mi/tamponade/esophageal perforation/pv stenosis/death. The patient understands that there is a 6-7% major complication rate and agrees to proceed. Thank you for this interesting consultation. Continue medical management for htn and cardiomyopathy. Thank you for allowing me to participate in Hoda Guardado 's care.     Ashli Bonilla MD, Jewell Mancia

## 2022-01-11 NOTE — PROGRESS NOTES
1. Have you been to the ER, urgent care clinic since your last visit? Hospitalized since your last visit? No    2. Have you seen or consulted any other health care providers outside of the [address] [place] [address] since your last visit? Include any pap smears or colon screening.  No           Chief Complaint   Patient presents with    Results     Stress test results, Pt denies cardiac symptoms

## 2022-01-11 NOTE — H&P (VIEW-ONLY)
Subjective:      Marnie Inman is a 70 y.o. male is here for EP consult.   He has episodes of palpitations    Patient Active Problem List    Diagnosis Date Noted    S/P CABG x 5 08/20/2020    Biventricular ICD (implantable cardioverter-defibrillator) in place 08/20/2020    S/P cardiac cath 02/08/2019    Nonischemic cardiomyopathy ([facility] 02/08/2019    Cardiomyopathy, ischemic 02/08/2019    Syncope 12/21/2018    ASHD (arteriosclerotic heart disease) 07/48/7720    Systolic CHF, chronic ([facility] 09/17/2013    Essential hypertension, benign 09/17/2013    Mixed hyperlipidemia 09/17/2013      Jazmyn River MD  Past Medical History:   Diagnosis Date    Atrial fibrillation [facility])     CAD (coronary artery disease)     Chronic systolic HF (heart failure) ([facility])     [facility] failure ([facility]     DM type 2 (diabetes mellitus, type 2) ([facility]     Gout     HTN (hypertension)     Hypercholesterolemia     ICD (implantable cardioverter-defibrillator) in place     [facility] 2/8/2019    S/P cardiac cath 2/8/2019 2/8/19 cardiac cath with nonobstructive disease    Stroke [facility])     tia IN 2019    Syncope       Past Surgical History:   Procedure Laterality Date    HX CORONARY ARTERY BYPASS GRAFT  2005    HX PACEMAKER Left 03/08/2019    ICD    NC CARDIAC SURG PROCEDURE UNLIST      NC INSJ/RPLCMT PERM DFB W/TRNSVNS LDS 1/DUAL CHMBR N/A 3/8/2019    INSERT ICD BIV MULTI performed by Kelsey Mendez MD at [facility] CARDIAC C[facility]     No Known Allergies   Family History   Problem Relation Age of Onset    Heart Disease Father     Heart Attack Father     Hypertension Brother     negative for cardiac disease  Social History     Socioeconomic History    Marital status:    Tobacco Use    Smoking status: Current Every Day Smoker     Packs/day: 0.25     Years: 40.00     Pack years: 10.00     Types: Cigarettes    Smokeless tobacco: Never Used    Tobacco comment: 4-8 cigarettes a day Vaping Use    Vaping Use: Never used   Substance and Sexual Activity    Alcohol use: Not Currently     Comment: NONE IN 3 MONTHS OR SO    Drug use: No     Current Outpatient Medications   Medication Sig    metFORMIN ER (GLUCOPHAGE XR) 500 mg tablet Take  by mouth. 3 tabs at night    warfarin (COUMADIN) 5 mg tablet TAKE 1 AND 1/2 TABLET ON FRIDAYS AND 1 TABLET ALL OTHER DAYS.  atorvastatin (LIPITOR) 10 mg tablet TAKE 1 TABLET EVERY DAY    glimepiride (AMARYL) 2 mg tablet Take 2 mg by mouth every morning.  ENTRESTO 24-26 mg tablet TAKE 1 TABLET BY MOUTH TWICE A DAY    ACCU-CHEK DAVE PLUS METER misc USE TO TEST BLOOD SUGAR    multivitamin (ONE A DAY) tablet Take 1 Tab by mouth daily.  aspirin delayed-release 81 mg tablet Take 81 mg by mouth daily.  carvedilol (COREG) 25 mg tablet Take 25 mg by mouth two (2) times daily (with meals).  potassium chloride (KLOR-CON M10) 10 mEq tablet Take 10 mEq by mouth two (2) times a day.  furosemide (LASIX) 20 mg tablet Take 20 mg by mouth daily.  amLODIPine (NORVASC) 5 mg tablet Take 5 mg by mouth daily. No current facility-administered medications for this visit. Vitals:    01/11/22 1343   BP: 122/84   Pulse: (!) 42   Resp: 18   SpO2: 98%   Weight: 215 lb 8 oz (97.8 kg)   Height: 5' 9\" (1.753 m)       I have reviewed the nurses notes, vitals, problem list, allergy list, medical history, family, social history and medications. Review of Symptoms:    General: Pt denies excessive weight gain or loss. Pt is able to conduct ADL's  HEENT: Denies blurred vision, headaches, hearing loss, epistaxis and difficulty swallowing. Respiratory: Denies cough, congestion, shortness of breath, TO, wheezing or stridor.   Cardiovascular: +palpitations,  Denies precordial pain,  edema or PND  Gastrointestinal: Denies poor appetite, indigestion, abdominal pain or blood in stool  Genitourinary: Denies hematuria, dysuria, increased urinary frequency  Musculoskeletal: Denies joint pain or swelling from muscles or joints  Neurologic: Denies tremor, paresthesias, headache, or sensory motor disturbance  Psychiatric: Denies confusion, insomnia, depression  Integumentray: Denies rash, itching or ulcers. Hematologic: Denies easy bruising, bleeding    Physical Exam:      General: Well developed, in no acute distress. HEENT: Eyes - PERRL, no jvd  Heart:  Normal S1/S2 negative S3 or S4. Regular, no murmur, gallop or rub. Respiratory: Clear bilaterally x 4, no wheezing or rales  Abdomen:   Soft, non-tender, bowel sounds are active. Extremities:  No edema, normal cap refill, no cyanosis. Musculoskeletal: No clubbing  Neuro: A&Ox3, speech clear, gait stable. Skin: Skin color is normal. No rashes or lesions.  Non diaphoretic, no ulcers or subcutaneous nodule  Vascular: 2+ pulses symmetric in all extremities  Psych - judgement intact and orientation is wnl     Cardiographics    icd - PAF noted    Stress - infarct, no ischemia, ef 35%    Results for orders placed or performed during the hospital encounter of 03/08/19   EKG, 12 LEAD, INITIAL   Result Value Ref Range    Ventricular Rate 71 BPM    Atrial Rate 71 BPM    P-R Interval 146 ms    QRS Duration 132 ms    Q-T Interval 440 ms    QTC Calculation (Bezet) 478 ms    Calculated P Axis 36 degrees    Calculated R Axis 164 degrees    Calculated T Axis -35 degrees    Diagnosis       Electronic ventricular pacemaker  When compared with ECG of 21-DEC-2018 11:39,  Electronic ventricular pacemaker has replaced Sinus rhythm  Confirmed by Jose M Bacon ([id number]) on 3/11/2019 9:23:53 AM           Lab Results   Component Value Date/Time    WBC 4.3 02/28/2019 03:44 PM    HGB 15.5 02/28/2019 03:44 PM    HCT 45.4 02/28/2019 03:44 PM    PLATELET 541 (L) 44/27/6735 03:44 PM    MCV 84 02/28/2019 03:44 PM      Lab Results   Component Value Date/Time    Sodium 145 (H) 02/28/2019 03:44 PM    Potassium 4.0 02/28/2019 03:44 PM Chloride 104 02/28/2019 03:44 PM    CO2 24 02/28/2019 03:44 PM    Anion gap 7 12/23/2018 04:52 AM    Glucose 139 (H) 02/28/2019 03:44 PM    BUN 11 02/28/2019 03:44 PM    Creatinine 1.02 02/28/2019 03:44 PM    BUN/Creatinine ratio 11 02/28/2019 03:44 PM    GFR est AA 87 02/28/2019 03:44 PM    GFR est non-AA 75 02/28/2019 03:44 PM    Calcium 9.2 02/28/2019 03:44 PM    Bilirubin, total 0.3 02/28/2019 03:44 PM    Alk. phosphatase 84 02/28/2019 03:44 PM    Protein, total 6.5 02/28/2019 03:44 PM    Albumin 4.1 02/28/2019 03:44 PM    Globulin 3.6 12/21/2018 11:48 AM    A-G Ratio 1.7 02/28/2019 03:44 PM    ALT (SGPT) 19 02/28/2019 03:44 PM         Assessment:     Assessment:        ICD-10-CM ICD-9-CM    1. Essential hypertension, benign  I10 401.1 CBC WITH AUTOMATED DIFF      PROTHROMBIN TIME + INR      METABOLIC PANEL, COMPREHENSIVE      XR CHEST PA LAT   2. Mixed hyperlipidemia  E78.2 272.2 CBC WITH AUTOMATED DIFF      PROTHROMBIN TIME + INR      METABOLIC PANEL, COMPREHENSIVE      XR CHEST PA LAT   3. Cardiomyopathy, ischemic  I25.5 414.8 CBC WITH AUTOMATED DIFF      PROTHROMBIN TIME + INR      METABOLIC PANEL, COMPREHENSIVE      XR CHEST PA LAT   4. Paroxysmal atrial fibrillation (HCC)  I48.0 427.31 CBC WITH AUTOMATED DIFF      PROTHROMBIN TIME + INR      METABOLIC PANEL, COMPREHENSIVE      XR CHEST PA LAT     Orders Placed This Encounter    XR CHEST PA LAT     Standing Status:   Future     Standing Expiration Date:   2/11/2023     Order Specific Question:   Reason for Exam     Answer:   pre op    CBC WITH AUTOMATED DIFF     Standing Status:   Future     Standing Expiration Date:   7/11/2022    PROTHROMBIN TIME + INR     Standing Status:   Future     Standing Expiration Date:   0/26/6892    METABOLIC PANEL, COMPREHENSIVE     Standing Status:   Future     Standing Expiration Date:   7/11/2022    metFORMIN ER (GLUCOPHAGE XR) 500 mg tablet     Sig: Take  by mouth.  3 tabs at night        Plan:   Mahsa Flores is having symptomatic PAF. He is stable and compliant with oac for elevated chadsvasc. Frankie Salas is a candidate for an afib ablation. I discussed the risks/benefits/alternatives of the procedure with the patient. Risks include (but are not limited to) bleeding, heart block, infection, cva/mi/tamponade/esophageal perforation/pv stenosis/death. The patient understands that there is a 8-5% major complication rate and agrees to proceed. Thank you for this interesting consultation. Continue medical management for htn and cardiomyopathy. Thank you for allowing me to participate in Frankie Salas 's care.     Isabela Tabares MD, Heather Griffin

## 2022-01-11 NOTE — LETTER
1/11/2022    Patient: Ashly Garcia   YOB: 1950   Date of Visit: 1/11/2022     Elvia Choudhury MD  [address] [address]  Via Fax: [phone]    Dear Elvia Choudhury MD,      Thank you for referring Mr. Ashly Garcia to [facility] for evaluation. My notes for this consultation are attached. If you have questions, please do not hesitate to call me. I look forward to following your patient along with you.       Sincerely,    Sher Felder MD

## 2022-01-12 ENCOUNTER — TELEPHONE (OUTPATIENT)
Dept: CARDIOLOGY CLINIC | Age: 72
End: 2022-01-12

## 2022-01-19 ENCOUNTER — HOSPITAL ENCOUNTER (OUTPATIENT)
Dept: GENERAL RADIOLOGY | Age: 72
Discharge: HOME | End: 2022-01-19
Payer: MEDICARE

## 2022-01-19 DIAGNOSIS — I10 ESSENTIAL HYPERTENSION, BENIGN: ICD-10-CM

## 2022-01-19 DIAGNOSIS — I25.5 CARDIOMYOPATHY, ISCHEMIC: ICD-10-CM

## 2022-01-19 DIAGNOSIS — E78.2 MIXED HYPERLIPIDEMIA: ICD-10-CM

## 2022-01-19 DIAGNOSIS — I48.0 PAROXYSMAL ATRIAL FIBRILLATION (HCC): ICD-10-CM

## 2022-01-19 PROCEDURE — 71046 X-RAY EXAM CHEST 2 VIEWS: CPT

## 2022-01-27 ENCOUNTER — HOSPITAL ENCOUNTER (OUTPATIENT)
Dept: PREADMISSION TESTING | Age: 72
Discharge: HOME | End: 2022-01-27
Payer: MEDICARE

## 2022-01-27 ENCOUNTER — TELEPHONE (OUTPATIENT)
Dept: CARDIOLOGY CLINIC | Age: 72
End: 2022-01-27

## 2022-01-27 LAB
SARS-COV-2, XPLCVT: NOT DETECTED
SOURCE, COVRS: NORMAL

## 2022-01-27 PROCEDURE — U0005 INFEC AGEN DETEC AMPLI PROBE: HCPCS

## 2022-01-27 NOTE — TELEPHONE ENCOUNTER
Please call pt he has some question about his procedure on 1/31/2022. Please call him at [phone].       Thanks Virginia

## 2022-01-27 NOTE — TELEPHONE ENCOUNTER
Message forwarded to Alin Lerma.      Following message from Mati/Briseyda:  S W pt and spouse all questions answered Thanks Jordy Gross

## 2022-01-31 ENCOUNTER — ANESTHESIA (OUTPATIENT)
Dept: CARDIAC CATH/INVASIVE PROCEDURES | Age: 72
End: 2022-01-31
Payer: MEDICARE

## 2022-01-31 ENCOUNTER — HOSPITAL ENCOUNTER (OUTPATIENT)
Age: 72
Discharge: HOME | End: 2022-01-31
Attending: INTERNAL MEDICINE | Admitting: INTERNAL MEDICINE
Payer: MEDICARE

## 2022-01-31 ENCOUNTER — APPOINTMENT (OUTPATIENT)
Dept: CARDIAC CATH/INVASIVE PROCEDURES | Age: 72
End: 2022-01-31
Attending: INTERNAL MEDICINE
Payer: MEDICARE

## 2022-01-31 ENCOUNTER — ANESTHESIA EVENT (OUTPATIENT)
Dept: CARDIAC CATH/INVASIVE PROCEDURES | Age: 72
End: 2022-01-31
Payer: MEDICARE

## 2022-01-31 VITALS
RESPIRATION RATE: 17 BRPM | HEART RATE: 75 BPM | HEIGHT: 68 IN | DIASTOLIC BLOOD PRESSURE: 79 MMHG | OXYGEN SATURATION: 98 % | WEIGHT: 212 LBS | SYSTOLIC BLOOD PRESSURE: 109 MMHG | TEMPERATURE: 98.6 F | BODY MASS INDEX: 32.13 KG/M2

## 2022-01-31 DIAGNOSIS — I10 ESSENTIAL HYPERTENSION, BENIGN: Chronic | ICD-10-CM

## 2022-01-31 DIAGNOSIS — I48.91 ATRIAL FIBRILLATION, UNSPECIFIED TYPE (HCC): ICD-10-CM

## 2022-01-31 DIAGNOSIS — I48.0 PAROXYSMAL ATRIAL FIBRILLATION (HCC): Chronic | ICD-10-CM

## 2022-01-31 DIAGNOSIS — I25.5 CARDIOMYOPATHY, ISCHEMIC: Chronic | ICD-10-CM

## 2022-01-31 DIAGNOSIS — I50.22 SYSTOLIC CHF, CHRONIC (HCC): Chronic | ICD-10-CM

## 2022-01-31 LAB
ACT BLD: 172 SECS (ref 79–138)
ACT BLD: 517 SECS (ref 79–138)
GLUCOSE BLD STRIP.AUTO-MCNC: 131 MG/DL (ref 65–117)
INR BLD: 2.1 (ref 0.9–1.2)
SERVICE CMNT-IMP: ABNORMAL

## 2022-01-31 PROCEDURE — C1733 CATH, EP, OTHR THAN COOL-TIP: HCPCS | Performed by: INTERNAL MEDICINE

## 2022-01-31 PROCEDURE — 76210000016 HC OR PH I REC 1 TO 1.5 HR

## 2022-01-31 PROCEDURE — 85347 COAGULATION TIME ACTIVATED: CPT

## 2022-01-31 PROCEDURE — 74011000258 HC RX REV CODE- 258: Performed by: NURSE ANESTHETIST, CERTIFIED REGISTERED

## 2022-01-31 PROCEDURE — C1730 CATH, EP, 19 OR FEW ELECT: HCPCS | Performed by: INTERNAL MEDICINE

## 2022-01-31 PROCEDURE — 76060000034 HC ANESTHESIA 1.5 TO 2 HR: Performed by: INTERNAL MEDICINE

## 2022-01-31 PROCEDURE — 82962 GLUCOSE BLOOD TEST: CPT

## 2022-01-31 PROCEDURE — 77030029359 HC PRB ESOPH TEMP CATH ANTM -F: Performed by: INTERNAL MEDICINE

## 2022-01-31 PROCEDURE — 77030008684 HC TU ET CUF COVD -B: Performed by: ANESTHESIOLOGY

## 2022-01-31 PROCEDURE — C1894 INTRO/SHEATH, NON-LASER: HCPCS | Performed by: INTERNAL MEDICINE

## 2022-01-31 PROCEDURE — 77030010880 HC CBL EP SUPRME STJU -C: Performed by: INTERNAL MEDICINE

## 2022-01-31 PROCEDURE — 77030008543 HC TBNG MON PRSS MRTM -A: Performed by: INTERNAL MEDICINE

## 2022-01-31 PROCEDURE — 76210000006 HC OR PH I REC 0.5 TO 1 HR: Performed by: INTERNAL MEDICINE

## 2022-01-31 PROCEDURE — 74011000250 HC RX REV CODE- 250: Performed by: NURSE ANESTHETIST, CERTIFIED REGISTERED

## 2022-01-31 PROCEDURE — 74011000250 HC RX REV CODE- 250: Performed by: INTERNAL MEDICINE

## 2022-01-31 PROCEDURE — 93656 COMPRE EP EVAL ABLTJ ATR FIB: CPT | Performed by: INTERNAL MEDICINE

## 2022-01-31 PROCEDURE — 93621 COMP EP EVL L PAC&REC C SINS: CPT | Performed by: INTERNAL MEDICINE

## 2022-01-31 PROCEDURE — C1769 GUIDE WIRE: HCPCS | Performed by: INTERNAL MEDICINE

## 2022-01-31 PROCEDURE — 77030030261 HC CBL UMB ELEC DISP MEDT -C: Performed by: INTERNAL MEDICINE

## 2022-01-31 PROCEDURE — C1759 CATH, INTRA ECHOCARDIOGRAPHY: HCPCS | Performed by: INTERNAL MEDICINE

## 2022-01-31 PROCEDURE — 77030018729 HC ELECTRD DEFIB PAD CARD -B: Performed by: INTERNAL MEDICINE

## 2022-01-31 PROCEDURE — 77030029163 HC CBL EP DX ACHV DISP MEDT -C: Performed by: INTERNAL MEDICINE

## 2022-01-31 PROCEDURE — 77030013797 HC KT TRNSDUC PRSSR EDWD -A: Performed by: INTERNAL MEDICINE

## 2022-01-31 PROCEDURE — 77030016894 HC CBL EP DX CATH3 STJU -B: Performed by: INTERNAL MEDICINE

## 2022-01-31 PROCEDURE — 74011250636 HC RX REV CODE- 250/636: Performed by: NURSE ANESTHETIST, CERTIFIED REGISTERED

## 2022-01-31 PROCEDURE — 77030013079 HC BLNKT BAIR HGGR 3M -A: Performed by: ANESTHESIOLOGY

## 2022-01-31 PROCEDURE — 77030021678 HC GLIDESCP STAT DISP VERT -B: Performed by: ANESTHESIOLOGY

## 2022-01-31 PROCEDURE — 77030030278 HC COAX UMB DISP MEDT -C: Performed by: INTERNAL MEDICINE

## 2022-01-31 PROCEDURE — 77030015398 HC CBL EP EXT STJU -C: Performed by: INTERNAL MEDICINE

## 2022-01-31 PROCEDURE — 74011250636 HC RX REV CODE- 250/636: Performed by: INTERNAL MEDICINE

## 2022-01-31 PROCEDURE — 74011000250 HC RX REV CODE- 250: Performed by: ANESTHESIOLOGY

## 2022-01-31 PROCEDURE — 2709999900 HC NON-CHARGEABLE SUPPLY: Performed by: INTERNAL MEDICINE

## 2022-01-31 PROCEDURE — 85610 PROTHROMBIN TIME: CPT

## 2022-01-31 RX ORDER — SODIUM CHLORIDE 0.9 % (FLUSH) 0.9 %
5-40 SYRINGE (ML) INJECTION EVERY 8 HOURS
Status: DISCONTINUED | OUTPATIENT
Start: 2022-01-31 | End: 2022-01-31 | Stop reason: HOSPADM

## 2022-01-31 RX ORDER — MIDAZOLAM HYDROCHLORIDE 1 MG/ML
INJECTION, SOLUTION INTRAMUSCULAR; INTRAVENOUS AS NEEDED
Status: DISCONTINUED | OUTPATIENT
Start: 2022-01-31 | End: 2022-01-31 | Stop reason: HOSPADM

## 2022-01-31 RX ORDER — OXYCODONE AND ACETAMINOPHEN 5; 325 MG/1; MG/1
1 TABLET ORAL AS NEEDED
Status: CANCELLED | OUTPATIENT
Start: 2022-01-31

## 2022-01-31 RX ORDER — MIDAZOLAM HYDROCHLORIDE 1 MG/ML
0.5 INJECTION, SOLUTION INTRAMUSCULAR; INTRAVENOUS
Status: CANCELLED | OUTPATIENT
Start: 2022-01-31

## 2022-01-31 RX ORDER — SODIUM CHLORIDE 0.9 % (FLUSH) 0.9 %
5-40 SYRINGE (ML) INJECTION AS NEEDED
Status: CANCELLED | OUTPATIENT
Start: 2022-01-31

## 2022-01-31 RX ORDER — MIDAZOLAM HYDROCHLORIDE 1 MG/ML
1 INJECTION, SOLUTION INTRAMUSCULAR; INTRAVENOUS AS NEEDED
Status: CANCELLED | OUTPATIENT
Start: 2022-01-31

## 2022-01-31 RX ORDER — LABETALOL HYDROCHLORIDE 5 MG/ML
5 INJECTION, SOLUTION INTRAVENOUS ONCE
Status: COMPLETED | OUTPATIENT
Start: 2022-01-31 | End: 2022-01-31

## 2022-01-31 RX ORDER — SUCCINYLCHOLINE CHLORIDE 20 MG/ML
INJECTION INTRAMUSCULAR; INTRAVENOUS AS NEEDED
Status: DISCONTINUED | OUTPATIENT
Start: 2022-01-31 | End: 2022-01-31 | Stop reason: HOSPADM

## 2022-01-31 RX ORDER — SODIUM CHLORIDE 0.9 % (FLUSH) 0.9 %
5-40 SYRINGE (ML) INJECTION EVERY 8 HOURS
Status: CANCELLED | OUTPATIENT
Start: 2022-01-31

## 2022-01-31 RX ORDER — SODIUM CHLORIDE 9 MG/ML
INJECTION, SOLUTION INTRAVENOUS
Status: DISCONTINUED | OUTPATIENT
Start: 2022-01-31 | End: 2022-01-31 | Stop reason: HOSPADM

## 2022-01-31 RX ORDER — EPHEDRINE SULFATE/0.9% NACL/PF 50 MG/5 ML
SYRINGE (ML) INTRAVENOUS AS NEEDED
Status: DISCONTINUED | OUTPATIENT
Start: 2022-01-31 | End: 2022-01-31 | Stop reason: HOSPADM

## 2022-01-31 RX ORDER — MORPHINE SULFATE 2 MG/ML
2 INJECTION, SOLUTION INTRAMUSCULAR; INTRAVENOUS
Status: CANCELLED | OUTPATIENT
Start: 2022-01-31

## 2022-01-31 RX ORDER — SODIUM CHLORIDE 9 MG/ML
50 INJECTION, SOLUTION INTRAVENOUS CONTINUOUS
Status: CANCELLED | OUTPATIENT
Start: 2022-01-31

## 2022-01-31 RX ORDER — SODIUM CHLORIDE 0.9 % (FLUSH) 0.9 %
5-40 SYRINGE (ML) INJECTION AS NEEDED
Status: DISCONTINUED | OUTPATIENT
Start: 2022-01-31 | End: 2022-01-31 | Stop reason: HOSPADM

## 2022-01-31 RX ORDER — SODIUM CHLORIDE 9 MG/ML
50 INJECTION, SOLUTION INTRAVENOUS CONTINUOUS
Status: CANCELLED | OUTPATIENT
Start: 2022-01-31 | End: 2022-02-01

## 2022-01-31 RX ORDER — ONDANSETRON 2 MG/ML
4 INJECTION INTRAMUSCULAR; INTRAVENOUS AS NEEDED
Status: CANCELLED | OUTPATIENT
Start: 2022-01-31

## 2022-01-31 RX ORDER — SODIUM CHLORIDE, SODIUM LACTATE, POTASSIUM CHLORIDE, CALCIUM CHLORIDE 600; 310; 30; 20 MG/100ML; MG/100ML; MG/100ML; MG/100ML
75 INJECTION, SOLUTION INTRAVENOUS CONTINUOUS
Status: CANCELLED | OUTPATIENT
Start: 2022-01-31 | End: 2022-02-01

## 2022-01-31 RX ORDER — FENTANYL CITRATE 50 UG/ML
50 INJECTION, SOLUTION INTRAMUSCULAR; INTRAVENOUS AS NEEDED
Status: CANCELLED | OUTPATIENT
Start: 2022-01-31

## 2022-01-31 RX ORDER — LIDOCAINE HYDROCHLORIDE 10 MG/ML
0.1 INJECTION, SOLUTION EPIDURAL; INFILTRATION; INTRACAUDAL; PERINEURAL AS NEEDED
Status: CANCELLED | OUTPATIENT
Start: 2022-01-31

## 2022-01-31 RX ORDER — PROPOFOL 10 MG/ML
INJECTION, EMULSION INTRAVENOUS AS NEEDED
Status: DISCONTINUED | OUTPATIENT
Start: 2022-01-31 | End: 2022-01-31 | Stop reason: HOSPADM

## 2022-01-31 RX ORDER — LIDOCAINE HYDROCHLORIDE 20 MG/ML
INJECTION, SOLUTION EPIDURAL; INFILTRATION; INTRACAUDAL; PERINEURAL AS NEEDED
Status: DISCONTINUED | OUTPATIENT
Start: 2022-01-31 | End: 2022-01-31 | Stop reason: HOSPADM

## 2022-01-31 RX ORDER — HYDROMORPHONE HYDROCHLORIDE 1 MG/ML
0.2 INJECTION, SOLUTION INTRAMUSCULAR; INTRAVENOUS; SUBCUTANEOUS
Status: CANCELLED | OUTPATIENT
Start: 2022-01-31

## 2022-01-31 RX ORDER — SODIUM CHLORIDE, SODIUM LACTATE, POTASSIUM CHLORIDE, CALCIUM CHLORIDE 600; 310; 30; 20 MG/100ML; MG/100ML; MG/100ML; MG/100ML
75 INJECTION, SOLUTION INTRAVENOUS CONTINUOUS
Status: CANCELLED | OUTPATIENT
Start: 2022-01-31

## 2022-01-31 RX ORDER — FENTANYL CITRATE 50 UG/ML
INJECTION, SOLUTION INTRAMUSCULAR; INTRAVENOUS AS NEEDED
Status: DISCONTINUED | OUTPATIENT
Start: 2022-01-31 | End: 2022-01-31 | Stop reason: HOSPADM

## 2022-01-31 RX ORDER — PROTAMINE SULFATE 10 MG/ML
INJECTION, SOLUTION INTRAVENOUS AS NEEDED
Status: DISCONTINUED | OUTPATIENT
Start: 2022-01-31 | End: 2022-01-31 | Stop reason: HOSPADM

## 2022-01-31 RX ORDER — DIPHENHYDRAMINE HYDROCHLORIDE 50 MG/ML
12.5 INJECTION, SOLUTION INTRAMUSCULAR; INTRAVENOUS AS NEEDED
Status: CANCELLED | OUTPATIENT
Start: 2022-01-31 | End: 2022-01-31

## 2022-01-31 RX ORDER — HEPARIN SODIUM 200 [USP'U]/100ML
INJECTION, SOLUTION INTRAVENOUS
Status: COMPLETED | OUTPATIENT
Start: 2022-01-31 | End: 2022-01-31

## 2022-01-31 RX ORDER — PHENYLEPHRINE HCL IN 0.9% NACL 0.4MG/10ML
SYRINGE (ML) INTRAVENOUS AS NEEDED
Status: DISCONTINUED | OUTPATIENT
Start: 2022-01-31 | End: 2022-01-31 | Stop reason: HOSPADM

## 2022-01-31 RX ORDER — HYDROCODONE BITARTRATE AND ACETAMINOPHEN 5; 325 MG/1; MG/1
1 TABLET ORAL
Status: DISCONTINUED | OUTPATIENT
Start: 2022-01-31 | End: 2022-01-31 | Stop reason: HOSPADM

## 2022-01-31 RX ORDER — ONDANSETRON 2 MG/ML
INJECTION INTRAMUSCULAR; INTRAVENOUS AS NEEDED
Status: DISCONTINUED | OUTPATIENT
Start: 2022-01-31 | End: 2022-01-31 | Stop reason: HOSPADM

## 2022-01-31 RX ORDER — HEPARIN SODIUM 1000 [USP'U]/ML
INJECTION, SOLUTION INTRAVENOUS; SUBCUTANEOUS AS NEEDED
Status: DISCONTINUED | OUTPATIENT
Start: 2022-01-31 | End: 2022-01-31 | Stop reason: HOSPADM

## 2022-01-31 RX ORDER — FENTANYL CITRATE 50 UG/ML
25 INJECTION, SOLUTION INTRAMUSCULAR; INTRAVENOUS
Status: CANCELLED | OUTPATIENT
Start: 2022-01-31

## 2022-01-31 RX ORDER — ACETAMINOPHEN 325 MG/1
650 TABLET ORAL
Status: DISCONTINUED | OUTPATIENT
Start: 2022-01-31 | End: 2022-01-31 | Stop reason: HOSPADM

## 2022-01-31 RX ADMIN — PROPOFOL 150 MG: 10 INJECTION, EMULSION INTRAVENOUS at 10:40

## 2022-01-31 RX ADMIN — LABETALOL HYDROCHLORIDE 5 MG: 5 INJECTION INTRAVENOUS at 12:40

## 2022-01-31 RX ADMIN — PROTAMINE SULFATE 100 MG: 10 INJECTION, SOLUTION INTRAVENOUS at 11:42

## 2022-01-31 RX ADMIN — FENTANYL CITRATE 50 MCG: 50 INJECTION, SOLUTION INTRAMUSCULAR; INTRAVENOUS at 10:40

## 2022-01-31 RX ADMIN — LIDOCAINE HYDROCHLORIDE 40 MG: 20 INJECTION, SOLUTION INTRAVENOUS at 10:40

## 2022-01-31 RX ADMIN — Medication 40 MCG: at 11:07

## 2022-01-31 RX ADMIN — Medication 80 MCG: at 10:43

## 2022-01-31 RX ADMIN — FENTANYL CITRATE 50 MCG: 50 INJECTION, SOLUTION INTRAMUSCULAR; INTRAVENOUS at 11:56

## 2022-01-31 RX ADMIN — HEPARIN SODIUM 15000 UNITS: 1000 INJECTION, SOLUTION INTRAVENOUS; SUBCUTANEOUS at 10:58

## 2022-01-31 RX ADMIN — Medication 10 MG: at 10:52

## 2022-01-31 RX ADMIN — SUCCINYLCHOLINE CHLORIDE 140 MG: 20 INJECTION, SOLUTION INTRAMUSCULAR; INTRAVENOUS at 10:40

## 2022-01-31 RX ADMIN — MIDAZOLAM HYDROCHLORIDE 2 MG: 1 INJECTION, SOLUTION INTRAMUSCULAR; INTRAVENOUS at 10:35

## 2022-01-31 RX ADMIN — PHENYLEPHRINE HYDROCHLORIDE 40 MCG/MIN: 10 INJECTION INTRAVENOUS at 10:46

## 2022-01-31 RX ADMIN — Medication 80 MCG: at 10:46

## 2022-01-31 RX ADMIN — SODIUM CHLORIDE: 9 INJECTION, SOLUTION INTRAVENOUS at 10:35

## 2022-01-31 RX ADMIN — ONDANSETRON HYDROCHLORIDE 4 MG: 2 INJECTION, SOLUTION INTRAMUSCULAR; INTRAVENOUS at 11:40

## 2022-01-31 NOTE — Clinical Note
TRANSFER - OUT REPORT:     Verbal report given to: Jonnie David RN. Report consisted of patient's Situation, Background, Assessment and   Recommendations(SBAR). Opportunity for questions and clarification was provided. Patient transported with a Registered Nurse. Patient transported to: PACU .

## 2022-01-31 NOTE — INTERVAL H&P NOTE
Update History & Physical    The Patient's History and Physical of January 11, 2022 was reviewed with the patient and I examined the patient. There was no change. The surgical site was confirmed by the patient and me. Plan:  The risk, benefits, expected outcome, and alternative to the recommended procedure have been discussed with the patient. Patient understands and wants to proceed with the procedure.     Electronically signed by Kaylee Ball MD on 1/31/2022 at 10:48 AM

## 2022-01-31 NOTE — PROGRESS NOTES
TRANSFER - IN REPORT:    Verbal report received from [address] on [facility].  being received from PACU for routine progression of care. Report consisted of patients Situation, Background, Assessment and Recommendations(SBAR). Information from the following report(s) SBAR was reviewed with the receiving clinician. Opportunity for questions and clarification was provided. Assessment completed upon patients arrival to [address] [place] [address] and care assumed. Cardiac Cath Lab Recovery Arrival Note:    [facility]. arrived to [facility]. Patient procedure= afib ablation. Patient on cardiac monitor, non-invasive blood pressure, SPO2 monitor. On RA . IV  of nacl on pump at kvo ml/hr. Patient status doing well without problems. Patient is A&Ox 4. Patient reports no complaints. PROCEDURE SITE CHECK:    Procedure site:without any bleeding and no hematoma, no pain/discomfort reported at procedure site. No change in patient status. Continue to monitor patient and status.

## 2022-01-31 NOTE — PROGRESS NOTES
Ambulate with pt in ruiz to BR. Gait steady. Bilateral groin dressings dry and intact. Pt denies c/o. DC instructions reviewed with pt and wife. Both verbalize understanding. SL dc'd without difficulty. Pt wheeled to front door to be driven  Home by wife.

## 2022-01-31 NOTE — PERIOP NOTES
TRANSFER - OUT REPORT:    Verbal report given to Yara CARRERA(name) on [facility].  being transferred to [facility](unit) for routine progression of care       Report consisted of patients Situation, Background, Assessment and   Recommendations(SBAR). Information from the following report(s) OR Summary, Intake/Output and MAR was reviewed with the receiving nurse. Opportunity for questions and clarification was provided.       Patient transported with:   Monitor  O2 @ 2 liters  Registered Nurse

## 2022-01-31 NOTE — DISCHARGE INSTRUCTIONS
[address]  [phone]        ATRIAL FIBRILLATION ABLATION DISCHARGE INSTRUCTIONS    Patient ID:  Allison Tipton  [id number]  70 y.o.  1950    Admit Date: 1/31/2022    Discharge Date: 1/31/2022     Admitting Physician: Khadijah Serrano MD     Discharge Physician: Khadijah Serrano MD    Admission Diagnoses:   Atrial fibrillation, unspecified type ([facility] [I48.91]  A-fib [facility]) [I48.91]    Discharge Diagnoses: Active Problems:    Systolic CHF, chronic ([facility] (9/17/2013)      Essential hypertension, benign (9/17/2013)      Cardiomyopathy, ischemic (2/8/2019)      Paroxysmal atrial fibrillation ([facility] (1/31/2022)      [facility]fib [email or web address] (1/31/2022)        Discharge Condition: Good    Cardiology Procedures this Admission:  Atrial fibrillation. Disposition: home    Reference discharge instructions provided by nursing for diet and activity. Follow-up with Dr Randi Giang or his Nurse Practitioners in 1-2 weeks. Call [phone] to make an appointment. Signed:  Khadijah Serrano MD  1/31/2022  11:57 AM    S/P ATRIAL FIBRILLATION ABLATION DISCHARGE INSTRUCTIONS    It is normal to feel tired the first couple days. Take it easy and follow the physicians instructions. CHECK THE CATHETER INSERTION SITE DAILY:  You may shower 24 hours after the procedure, remove the bandage during showering. Wash with soap and water and pat dry. Gentle cleaning of the site with soap and water is sufficient, cover with a dry clean dressing or bandage. Do not apply creams or powders to the area. Do not sit in a bathtub or pool of water for 7 days or until wound has completely healed. Temporary bruising and discomfort is normal and may last a few weeks. You may have a  formation of a small lump at the site which may last up to 6 weeks.     CALL THE PHYSICIAN:  If the site becomes red, swollen or feels warm to the touch  If there is bleeding or drainage or if there is unusual pain at the groin or down the leg. If there is any bleeding, lie down, apply pressure or have someone apply pressure with a clean cloth until the bleeding stops. If the bleeding continues, call 911 to be transported to the hospital.  DO NOT DRIVE YOURSELF, Mi [place]. Activity:      For the first 24-48 hours or as instructed by the physician:  No lifting, pushing or pulling over 5 pounds and no straining the insertion site. Do not lift grocery bags or the garbage can, do not run the vacuum  or  for 7 days. Start with short walks as in the hospital and gradually increase as tolerated each day. It is recommended to walk 30 minutes 5-7 days per week. Follow your physicians instructions on activity. Avoid walking outside in extremes of heat or cold. Walk inside when it is cold and windy or hot and humid. Things to keep in mind:  No driving for at least 5 days or as designated by your physician. Limit the number of times you go up and down the stairs  Take rests and pace yourself with activity. Be careful and do not strain with bowel movements. Medications: Take all medications as prescribed  Call your physician if you have any questions  Keep an updated list of your medications with you at all times and give a list to your physician and pharmacist    Signs and Symptoms:  Be cautious of symptoms of angina or recurrent symptoms such as chest discomfort, unusual shortness of breath or fatigue, palpitations. After Care: Follow up with your physician as instructed. Follow a heart healthy diet with proper portion control, daily stress management, daily exercise, blood pressure and cholesterol control , and smoking cessation.

## 2022-01-31 NOTE — PROGRESS NOTES
Primary Nurse Mario Chaidez RN and Jose Ramachandran RN performed a dual skin assessment on this patient No impairment noted  Alfonzo score is 23

## 2022-01-31 NOTE — ANESTHESIA POSTPROCEDURE EVALUATION
Procedure(s):  ABLATION A-FIB  W COMPLETE EP STUDY  EP 3D MAPPING  LT ATRIAL PACE & RECORD DURING EP STUDY. general    Anesthesia Post Evaluation      Multimodal analgesia: multimodal analgesia used between 6 hours prior to anesthesia start to PACU discharge  Patient location during evaluation: PACU  Patient participation: complete - patient participated  Level of consciousness: awake and alert  Pain management: adequate  Airway patency: patent  Anesthetic complications: no  Cardiovascular status: acceptable  Respiratory status: acceptable  Hydration status: acceptable  Comments: Seen, no complaints   Post anesthesia nausea and vomiting:  none  Final Post Anesthesia Temperature Assessment:  Normothermia (36.0-37.5 degrees C)      INITIAL Post-op Vital signs:   Vitals Value Taken Time   /94 01/31/22 1300   Temp 36.9 °C (98.4 °F) 01/31/22 1209   Pulse 76 01/31/22 1302   Resp 14 01/31/22 1302   SpO2 95 % 01/31/22 1302   Vitals shown include unvalidated device data.

## 2022-01-31 NOTE — Clinical Note
Sheath #1: sheath exchanged for [facility] 12 FR -- . Hemostasis achieved.  Cryo sheath advanced transseptal and connected to heparin pressure line

## 2022-01-31 NOTE — PERIOP NOTES
Handoff Report from Operating Room to PACU    Report received from Delmi Hart and Margret Ann CRNA regarding [facility]. Dmitri Walls      Surgeon(s):  Anesthesia, Case  And Procedure(s) (LRB):  SPECIAL PROCEDURE OUTSIDE OF OR (N/A)  confirmed   with allergies and dressings discussed. Anesthesia type, drugs, patient history, complications, estimated blood loss, vital signs, intake and output, and last pain medication, lines, reversal medications and temperature were reviewed.

## 2022-01-31 NOTE — ANESTHESIA PREPROCEDURE EVALUATION
Anesthetic History   No history of anesthetic complications            Review of Systems / Medical History  Patient summary reviewed, nursing notes reviewed and pertinent labs reviewed    Pulmonary    COPD      Smoker         Neuro/Psych       CVA       Cardiovascular    Hypertension      CHF  Dysrhythmias : atrial fibrillation  Pacemaker, CAD, CABG (2005) and hyperlipidemia    Exercise tolerance: <4 METS  Comments: EF 33%  Paced    GI/Hepatic/Renal  Within defined limits              Endo/Other    Diabetes: type 2    Obesity     Other Findings   Comments: Gout             Physical Exam    Airway  Mallampati: III  TM Distance: 4 - 6 cm  Neck ROM: decreased range of motion   Mouth opening: Normal     Cardiovascular  Regular rate and rhythm,  S1 and S2 normal,  no murmur, click, rub, or gallop  Rhythm: regular  Rate: normal         Dental    Dentition: Caps/crowns and Poor dentition  Comments: Upper front recently dislodged    Pulmonary  Breath sounds clear to auscultation          Prolonged expiration     Abdominal  GI exam deferred       Other Findings            Anesthetic Plan    ASA: 3  Anesthesia type: general          Induction: Intravenous  Anesthetic plan and risks discussed with: Patient

## 2022-01-31 NOTE — PROGRESS NOTES
Attempting to get pt up to ambulate. Right groin dressing bloody. Manual pressure applied x 10 minutes. Site soft and dry. Dry sterile dressing applied. Pt to cont to be flat for now. Will cont to monitor.

## 2022-01-31 NOTE — PROGRESS NOTES
Cardiac Cath Lab Recovery Arrival Note:      Cruz Minor. arrived to Cardiac Cath Lab, Recovery Area. Staff introduced to patient. Patient identifiers verified with NAME and DATE OF BIRTH. Procedure verified with patient. Consent forms reviewed and signed by patient or authorized representative and verified. Allergies verified. Patient and family oriented to department. Patient and family informed of procedure and plan of care. Questions answered with review. Patient prepped for procedure, per orders from physician, prior to arrival.    Patient on cardiac monitor, non-invasive blood pressure, SPO2 monitor. On room air. Patient is A&Ox 3. Patient reports no c/o. Patient in stretcher, in low position, with side rails up, call bell within reach, patient instructed to call if assistance as needed. Patient prep in: [address], [place]. Patient family has pager # none  Family in: [facility] waiting area.    Prep by: Evin Mcgee, DEBI and Keisha Garcia RN

## 2022-02-14 ENCOUNTER — OFFICE VISIT (OUTPATIENT)
Dept: CARDIOLOGY CLINIC | Age: 72
End: 2022-02-14

## 2022-02-14 DIAGNOSIS — I50.22 SYSTOLIC CHF, CHRONIC (HCC): ICD-10-CM

## 2022-02-14 DIAGNOSIS — Z95.810 ICD (IMPLANTABLE CARDIOVERTER-DEFIBRILLATOR) IN PLACE: Primary | ICD-10-CM

## 2022-02-15 ENCOUNTER — ANTI-COAG VISIT (OUTPATIENT)
Dept: CARDIOLOGY CLINIC | Age: 72
End: 2022-02-15
Payer: MEDICARE

## 2022-02-15 ENCOUNTER — OFFICE VISIT (OUTPATIENT)
Dept: CARDIOLOGY CLINIC | Age: 72
End: 2022-02-15

## 2022-02-15 VITALS
OXYGEN SATURATION: 98 % | RESPIRATION RATE: 16 BRPM | HEART RATE: 80 BPM | DIASTOLIC BLOOD PRESSURE: 80 MMHG | WEIGHT: 215.2 LBS | HEIGHT: 68 IN | SYSTOLIC BLOOD PRESSURE: 140 MMHG | BODY MASS INDEX: 32.61 KG/M2

## 2022-02-15 DIAGNOSIS — Z79.01 LONG TERM (CURRENT) USE OF ANTICOAGULANTS: Primary | ICD-10-CM

## 2022-02-15 DIAGNOSIS — I48.0 PAROXYSMAL ATRIAL FIBRILLATION (HCC): ICD-10-CM

## 2022-02-15 DIAGNOSIS — I48.0 PAROXYSMAL ATRIAL FIBRILLATION (HCC): Primary | ICD-10-CM

## 2022-02-15 DIAGNOSIS — E78.2 MIXED HYPERLIPIDEMIA: ICD-10-CM

## 2022-02-15 DIAGNOSIS — I10 ESSENTIAL HYPERTENSION, BENIGN: ICD-10-CM

## 2022-02-15 DIAGNOSIS — I50.22 SYSTOLIC CHF, CHRONIC (HCC): ICD-10-CM

## 2022-02-15 DIAGNOSIS — I42.8 NONISCHEMIC CARDIOMYOPATHY (HCC): ICD-10-CM

## 2022-02-15 LAB
INR BLD: 2.2 (ref 1–1.5)
PT POC: 26.1 SECONDS (ref 9.1–12)
VALID INTERNAL CONTROL?: YES

## 2022-02-15 PROCEDURE — G8417 CALC BMI ABV UP PARAM F/U: HCPCS | Performed by: INTERNAL MEDICINE

## 2022-02-15 PROCEDURE — 3017F COLORECTAL CA SCREEN DOC REV: CPT | Performed by: INTERNAL MEDICINE

## 2022-02-15 PROCEDURE — 1101F PT FALLS ASSESS-DOCD LE1/YR: CPT | Performed by: INTERNAL MEDICINE

## 2022-02-15 PROCEDURE — G8754 DIAS BP LESS 90: HCPCS | Performed by: INTERNAL MEDICINE

## 2022-02-15 PROCEDURE — 99213 OFFICE O/P EST LOW 20 MIN: CPT | Performed by: INTERNAL MEDICINE

## 2022-02-15 PROCEDURE — G8510 SCR DEP NEG, NO PLAN REQD: HCPCS | Performed by: INTERNAL MEDICINE

## 2022-02-15 PROCEDURE — G8753 SYS BP > OR = 140: HCPCS | Performed by: INTERNAL MEDICINE

## 2022-02-15 PROCEDURE — 93000 ELECTROCARDIOGRAM COMPLETE: CPT | Performed by: INTERNAL MEDICINE

## 2022-02-15 PROCEDURE — G8427 DOCREV CUR MEDS BY ELIG CLIN: HCPCS | Performed by: INTERNAL MEDICINE

## 2022-02-15 PROCEDURE — 85610 PROTHROMBIN TIME: CPT | Performed by: INTERNAL MEDICINE

## 2022-02-15 PROCEDURE — G8536 NO DOC ELDER MAL SCRN: HCPCS | Performed by: INTERNAL MEDICINE

## 2022-02-15 NOTE — LETTER
2/15/2022    Patient: Barby Griffith. YOB: 1950   Date of Visit: 2/15/2022     Amanda Greene MD  [address] [place] [id number]  Via Fax: [phone]     DEANNA Nelson  [address]  Via Fax: [phone]    Dear MD Amanda Nelson PA,      Thank you for referring Mr. Deedee Kaur to [facility] for evaluation. My notes for this consultation are attached. If you have questions, please do not hesitate to call me. I look forward to following your patient along with you.       Sincerely,    Edwardo Stallworth MD

## 2022-02-15 NOTE — PROGRESS NOTES
Subjective:      Yariel Gonzalez is a 70 y.o. male is here for EP consult. The patient denies chest pain/ shortness of breath, orthopnea, PND, LE edema, palpitations, syncope, presyncope or fatigue.        Patient Active Problem List    Diagnosis Date Noted    Paroxysmal atrial fibrillation ([address] 01/31/2022    A-fib ([address] 01/31/2022    S/P CABG x 5 08/20/2020    Biventricular ICD (implantable cardioverter-defibrillator) in place 08/20/2020    S/P cardiac cath 02/08/2019    Nonischemic cardiomyopathy ([address] 02/08/2019    Cardiomyopathy, ischemic 02/08/2019    Syncope 12/21/2018    ASHD (arteriosclerotic heart disease) 24/29/5554    Systolic CHF, chronic ([address] 09/17/2013    Essential hypertension, benign 09/17/2013    Mixed hyperlipidemia 09/17/2013      Jazmyn River MD  Past Medical History:   Diagnosis Date    Atrial fibrillation ([address]     CAD (coronary artery disease)     Chronic systolic HF (heart failure) ([facility])     Congestive heart failure ([address]     DM type 2 (diabetes mellitus, type 2) ([address]     Gout     HTN (hypertension)     Hypercholesterolemia     ICD (implantable cardioverter-defibrillator) in place     Nonischemic cardiomyopathy ([address] 2/8/2019    S/P cardiac cath 2/8/2019 2/8/19 cardiac cath with nonobstructive disease    Stroke [facility])     tia IN 2019    Syncope       Past Surgical History:   Procedure Laterality Date    HX CORONARY ARTERY BYPASS GRAFT  2005    HX HEART CATHETERIZATION  01/31/2022    A-Fib Ablation    HX PACEMAKER Left 03/08/2019    ICD    [place] CARDIAC SURG PROCEDURE UNLIST      [place] INSJ/RPLCMT PERM DFB W/TRNSVNS LDS 1/DUAL CHMBR N/A 3/8/2019    INSERT ICD BIV MULTI performed by Kelsey Mendez MD at [facility] CARDIAC [facility]     No Known Allergies   Family History   Problem Relation Age of Onset    Heart Disease Father     Heart Attack Father     Hypertension Brother     negative for cardiac disease  Social History     Socioeconomic History    Marital status:    Tobacco Use    Smoking status: Current Every Day Smoker     Packs/day: 0.25     Years: 40.00     Pack years: 10.00     Types: Cigarettes    Smokeless tobacco: Never Used    Tobacco comment: 4-8 cigarettes a day   Vaping Use    Vaping Use: Never used   Substance and Sexual Activity    Alcohol use: Not Currently     Comment: NONE IN 3 MONTHS OR SO    Drug use: No    Sexual activity: Yes     Partners: Female     Current Outpatient Medications   Medication Sig    metFORMIN ER (GLUCOPHAGE XR) 500 mg tablet Take  by mouth. 3 tabs at night    warfarin (COUMADIN) 5 mg tablet TAKE 1 AND 1/2 TABLET ON FRIDAYS AND 1 TABLET ALL OTHER DAYS.  atorvastatin (LIPITOR) 10 mg tablet TAKE 1 TABLET EVERY DAY    glimepiride (AMARYL) 2 mg tablet Take 2 mg by mouth every morning.  ENTRESTO 24-26 mg tablet TAKE 1 TABLET BY MOUTH TWICE A DAY    ACCU-CHEK DAVE PLUS METER misc USE TO TEST BLOOD SUGAR    multivitamin (ONE A DAY) tablet Take 1 Tab by mouth daily.  aspirin delayed-release 81 mg tablet Take 81 mg by mouth daily.  carvedilol (COREG) 25 mg tablet Take 25 mg by mouth two (2) times daily (with meals).  potassium chloride (KLOR-CON M10) 10 mEq tablet Take 10 mEq by mouth two (2) times a day.  furosemide (LASIX) 20 mg tablet Take 20 mg by mouth daily.  amLODIPine (NORVASC) 5 mg tablet Take 5 mg by mouth daily. No current facility-administered medications for this visit. Vitals:    02/15/22 0949   BP: (!) 140/80   Pulse: 80   Resp: 16   SpO2: 98%   Weight: 215 lb 3.2 oz (97.6 kg)   Height: 5' 8\" (1.727 m)       I have reviewed the nurses notes, vitals, problem list, allergy list, medical history, family, social history and medications. Review of Symptoms:    General: Pt denies excessive weight gain or loss. Pt is able to conduct ADL's  HEENT: Denies blurred vision, headaches, hearing loss, epistaxis and difficulty swallowing.   Respiratory: Denies cough, congestion, shortness of breath, TO, wheezing or stridor. Cardiovascular: Denies precordial pain, palpitations, edema or PND  Gastrointestinal: Denies poor appetite, indigestion, abdominal pain or blood in stool  Genitourinary: Denies hematuria, dysuria, increased urinary frequency  Musculoskeletal: Denies joint pain or swelling from muscles or joints  Neurologic: Denies tremor, paresthesias, headache, or sensory motor disturbance  Psychiatric: Denies confusion, insomnia, depression  Integumentray: Denies rash, itching or ulcers. Hematologic: Denies easy bruising, bleeding    Physical Exam:      General: Well developed, in no acute distress. HEENT: Eyes - PERRL, no jvd  Heart:  Normal S1/S2 negative S3 or S4. Regular, no murmur, gallop or rub. Respiratory: Clear bilaterally x 4, no wheezing or rales  Abdomen:   Soft, non-tender, bowel sounds are active. Extremities:  No edema, normal cap refill, no cyanosis. Musculoskeletal: No clubbing  Neuro: A&Ox3, speech clear, gait stable. Skin: Skin color is normal. No rashes or lesions.  Non diaphoretic, no ulcers or subcutaneous nodule  Vascular: 2+ pulses symmetric in all extremities  Psych - judgement intact and orientation is wnl     Cardiographics    Ekg: nsr    Results for orders placed or performed during the hospital encounter of 03/08/19   EKG, 12 LEAD, INITIAL   Result Value Ref Range    Ventricular Rate 71 BPM    Atrial Rate 71 BPM    P-R Interval 146 ms    QRS Duration 132 ms    Q-T Interval 440 ms    QTC Calculation (Bezet) 478 ms    Calculated P Axis 36 degrees    Calculated R Axis 164 degrees    Calculated T Axis -35 degrees    Diagnosis       Electronic ventricular pacemaker  When compared with ECG of 21-DEC-2018 11:39,  Electronic ventricular pacemaker has replaced Sinus rhythm  Confirmed by Arlen Stahl ([id number]) on 3/11/2019 9:23:53 AM           Lab Results   Component Value Date/Time    WBC 4.8 01/19/2022 03:35 PM    HGB 16.2 01/19/2022 03:35 PM    HCT 49.2 01/19/2022 03:35 PM    PLATELET 909 26/48/8481 03:35 PM    MCV 86.8 01/19/2022 03:35 PM      Lab Results   Component Value Date/Time    Sodium 141 01/19/2022 03:35 PM    Potassium 3.7 01/19/2022 03:35 PM    Chloride 110 (H) 01/19/2022 03:35 PM    CO2 27 01/19/2022 03:35 PM    Anion gap 4 (L) 01/19/2022 03:35 PM    Glucose 130 (H) 01/19/2022 03:35 PM    BUN 11 01/19/2022 03:35 PM    Creatinine 1.15 01/19/2022 03:35 PM    BUN/Creatinine ratio 10 (L) 01/19/2022 03:35 PM    GFR est AA >60 01/19/2022 03:35 PM    GFR est non-AA >60 01/19/2022 03:35 PM    Calcium 9.3 01/19/2022 03:35 PM    Bilirubin, total 0.4 01/19/2022 03:35 PM    Alk. phosphatase 94 01/19/2022 03:35 PM    Protein, total 7.1 01/19/2022 03:35 PM    Albumin 3.5 01/19/2022 03:35 PM    Globulin 3.6 01/19/2022 03:35 PM    A-G Ratio 1.0 (L) 01/19/2022 03:35 PM    ALT (SGPT) 32 01/19/2022 03:35 PM         Assessment:     Assessment:        ICD-10-CM ICD-9-CM    1. Paroxysmal atrial fibrillation (HCC)  I48.0 427.31 AMB POC EKG ROUTINE W/ 12 LEADS, INTER & REP   2. Nonischemic cardiomyopathy (HCC)  I42.8 425.4    3. Essential hypertension, benign  I10 401.1    4. Mixed hyperlipidemia  E78.2 272.2    5. Systolic CHF, chronic (HCC)  I50.22 428.22      428.0      Orders Placed This Encounter    AMB POC EKG ROUTINE W/ 12 LEADS, INTER & REP     Order Specific Question:   Reason for Exam:     Answer:   routine        Plan:   Onofre Turner is sp af abl. In sinus and asymptomatic. Stable and compliant with oac. Cont med rx for cardiomyopathy and chf. F/u in 3 months. Thank you for allowing me to participate in Onofre Turner 's care.     Alba Baumgarten, MD, Andrzej Ricketts

## 2022-02-15 NOTE — PROGRESS NOTES
Chief Complaint   Patient presents with   [facility] Follow Up     S/P A-Fib  Ablation     1. Have you been to the ER, urgent care clinic since your last visit? No  Hospitalized since your last visit? Yes A-Fib Ablation    2. Have you seen or consulted any other health care providers outside of the [address] [place] [address] since your last visit? No  Include any pap smears or colon screening.  No

## 2022-03-18 ENCOUNTER — ANTI-COAG VISIT (OUTPATIENT)
Dept: CARDIOLOGY CLINIC | Age: 72
End: 2022-03-18
Payer: MEDICARE

## 2022-03-18 DIAGNOSIS — Z79.01 LONG TERM (CURRENT) USE OF ANTICOAGULANTS: Primary | ICD-10-CM

## 2022-03-18 DIAGNOSIS — I48.0 PAROXYSMAL ATRIAL FIBRILLATION (HCC): ICD-10-CM

## 2022-03-18 PROBLEM — Z98.890 S/P CARDIAC CATH: Status: ACTIVE | Noted: 2019-02-08

## 2022-03-18 LAB
INR BLD: 1.8 (ref 1–1.5)
PT POC: 22.1 SECONDS (ref 9.1–12)
VALID INTERNAL CONTROL?: YES

## 2022-03-18 PROCEDURE — 85610 PROTHROMBIN TIME: CPT | Performed by: INTERNAL MEDICINE

## 2022-03-19 PROBLEM — I48.0 PAROXYSMAL ATRIAL FIBRILLATION (HCC): Status: ACTIVE | Noted: 2022-01-31

## 2022-03-19 PROBLEM — I25.5 CARDIOMYOPATHY, ISCHEMIC: Status: ACTIVE | Noted: 2019-02-08

## 2022-03-19 PROBLEM — I48.91 A-FIB (HCC): Status: ACTIVE | Noted: 2022-01-31

## 2022-03-19 PROBLEM — R55 SYNCOPE: Status: ACTIVE | Noted: 2018-12-21

## 2022-03-19 PROBLEM — Z95.810 BIVENTRICULAR ICD (IMPLANTABLE CARDIOVERTER-DEFIBRILLATOR) IN PLACE: Status: ACTIVE | Noted: 2020-08-20

## 2022-03-19 PROBLEM — I42.8 NONISCHEMIC CARDIOMYOPATHY (HCC): Status: ACTIVE | Noted: 2019-02-08

## 2022-03-20 PROBLEM — Z95.1 S/P CABG X 5: Status: ACTIVE | Noted: 2020-08-20

## 2022-03-24 ENCOUNTER — OFFICE VISIT (OUTPATIENT)
Dept: CARDIOLOGY CLINIC | Age: 72
End: 2022-03-24
Payer: MEDICARE

## 2022-03-24 DIAGNOSIS — I50.22 SYSTOLIC CHF, CHRONIC (HCC): ICD-10-CM

## 2022-03-24 DIAGNOSIS — I42.8 NONISCHEMIC CARDIOMYOPATHY (HCC): Primary | ICD-10-CM

## 2022-03-24 DIAGNOSIS — Z95.810 ICD (IMPLANTABLE CARDIOVERTER-DEFIBRILLATOR) IN PLACE: ICD-10-CM

## 2022-03-24 PROCEDURE — 93296 REM INTERROG EVL PM/IDS: CPT | Performed by: INTERNAL MEDICINE

## 2022-03-24 PROCEDURE — 93295 DEV INTERROG REMOTE 1/2/MLT: CPT | Performed by: INTERNAL MEDICINE

## 2022-03-31 ENCOUNTER — OFFICE VISIT (OUTPATIENT)
Dept: CARDIOLOGY CLINIC | Age: 72
End: 2022-03-31
Payer: MEDICARE

## 2022-03-31 VITALS
BODY MASS INDEX: 32.95 KG/M2 | OXYGEN SATURATION: 97 % | HEIGHT: 68 IN | SYSTOLIC BLOOD PRESSURE: 98 MMHG | HEART RATE: 68 BPM | RESPIRATION RATE: 18 BRPM | WEIGHT: 217.4 LBS | DIASTOLIC BLOOD PRESSURE: 62 MMHG

## 2022-03-31 DIAGNOSIS — I47.20 VT (VENTRICULAR TACHYCARDIA): Primary | ICD-10-CM

## 2022-03-31 DIAGNOSIS — I10 ESSENTIAL HYPERTENSION, BENIGN: ICD-10-CM

## 2022-03-31 DIAGNOSIS — I47.20 VT (VENTRICULAR TACHYCARDIA): ICD-10-CM

## 2022-03-31 DIAGNOSIS — I50.22 SYSTOLIC CHF, CHRONIC (HCC): ICD-10-CM

## 2022-03-31 DIAGNOSIS — I25.5 CARDIOMYOPATHY, ISCHEMIC: ICD-10-CM

## 2022-03-31 DIAGNOSIS — I48.0 PAROXYSMAL ATRIAL FIBRILLATION (HCC): ICD-10-CM

## 2022-03-31 DIAGNOSIS — I25.10 ASHD (ARTERIOSCLEROTIC HEART DISEASE): ICD-10-CM

## 2022-03-31 DIAGNOSIS — I42.8 NONISCHEMIC CARDIOMYOPATHY (HCC): Primary | ICD-10-CM

## 2022-03-31 PROCEDURE — 3017F COLORECTAL CA SCREEN DOC REV: CPT | Performed by: INTERNAL MEDICINE

## 2022-03-31 PROCEDURE — G8752 SYS BP LESS 140: HCPCS | Performed by: INTERNAL MEDICINE

## 2022-03-31 PROCEDURE — G8510 SCR DEP NEG, NO PLAN REQD: HCPCS | Performed by: INTERNAL MEDICINE

## 2022-03-31 PROCEDURE — 1101F PT FALLS ASSESS-DOCD LE1/YR: CPT | Performed by: INTERNAL MEDICINE

## 2022-03-31 PROCEDURE — G8427 DOCREV CUR MEDS BY ELIG CLIN: HCPCS | Performed by: INTERNAL MEDICINE

## 2022-03-31 PROCEDURE — G8417 CALC BMI ABV UP PARAM F/U: HCPCS | Performed by: INTERNAL MEDICINE

## 2022-03-31 PROCEDURE — G8754 DIAS BP LESS 90: HCPCS | Performed by: INTERNAL MEDICINE

## 2022-03-31 PROCEDURE — 99214 OFFICE O/P EST MOD 30 MIN: CPT | Performed by: INTERNAL MEDICINE

## 2022-03-31 PROCEDURE — G8536 NO DOC ELDER MAL SCRN: HCPCS | Performed by: INTERNAL MEDICINE

## 2022-03-31 NOTE — H&P (VIEW-ONLY)
Subjective:      Oc Lemus is a 70 y.o. male is here for EP consult. Had long run of VT on 2/22.  Denies symptoms      Patient Active Problem List    Diagnosis Date Noted    Paroxysmal atrial fibrillation ([address] 01/31/2022    A-fib ([address] 01/31/2022    S/P CABG x 5 08/20/2020    Biventricular ICD (implantable cardioverter-defibrillator) in place 08/20/2020    S/P cardiac cath 02/08/2019    Nonischemic cardiomyopathy ([address] 02/08/2019    Cardiomyopathy, ischemic 02/08/2019    Syncope 12/21/2018    ASHD (arteriosclerotic heart disease) 53/58/7694    Systolic CHF, chronic ([address] 09/17/2013    Essential hypertension, benign 09/17/2013    Mixed hyperlipidemia 09/17/2013      Norman Dent MD  Past Medical History:   Diagnosis Date    Atrial fibrillation ([address]     CAD (coronary artery disease)     Chronic systolic HF (heart failure) (HCC)     Congestive heart failure ([address]     DM type 2 (diabetes mellitus, type 2) ([address]     Gout     HTN (hypertension)     Hypercholesterolemia     ICD (implantable cardioverter-defibrillator) in place     Nonischemic cardiomyopathy ([address] 2/8/2019    S/P cardiac cath 2/8/2019 2/8/19 cardiac cath with nonobstructive disease    Stroke [facility])     tia IN 2019    Syncope       Past Surgical History:   Procedure Laterality Date    HX CORONARY ARTERY BYPASS GRAFT  2005    HX HEART CATHETERIZATION  01/31/2022    A-Fib Ablation    HX PACEMAKER Left 03/08/2019    ICD    [place] CARDIAC SURG PROCEDURE UNLIST      [place] INSJ/RPLCMT PERM DFB W/TRNSVNS LDS 1/DUAL CHMBR N/A 3/8/2019    INSERT ICD BIV MULTI performed by Vonda Portillo MD at [facility] CARDIAC CATH LAB     No Known Allergies   Family History   Problem Relation Age of Onset    Heart Disease Father     Heart Attack Father     Hypertension Brother     negative for cardiac disease  Social History     Socioeconomic History    Marital status:    Tobacco Use    Smoking status: Current Every Day Smoker Packs/day: 0.25     Years: 40.00     Pack years: 10.00     Types: Cigarettes    Smokeless tobacco: Never Used    Tobacco comment: 4-8 cigarettes a day   Vaping Use    Vaping Use: Never used   Substance and Sexual Activity    Alcohol use: Not Currently     Comment: NONE IN 3 MONTHS OR SO    Drug use: No    Sexual activity: Yes     Partners: Female     Current Outpatient Medications   Medication Sig    metFORMIN ER (GLUCOPHAGE XR) 500 mg tablet Take  by mouth. 3 tabs at night    warfarin (COUMADIN) 5 mg tablet TAKE 1 AND 1/2 TABLET ON FRIDAYS AND 1 TABLET ALL OTHER DAYS. (Patient taking differently: TAKE 1 AND 1/2 TABLET ON FRIDAYS AND SATURDAYS - 1 TABLET ALL OTHER DAYS.)    atorvastatin (LIPITOR) 10 mg tablet TAKE 1 TABLET EVERY DAY    glimepiride (AMARYL) 2 mg tablet Take 2 mg by mouth every morning.  ENTRESTO 24-26 mg tablet TAKE 1 TABLET BY MOUTH TWICE A DAY    ACCU-CHEK DAVE PLUS METER misc USE TO TEST BLOOD SUGAR    multivitamin (ONE A DAY) tablet Take 1 Tab by mouth daily.  aspirin delayed-release 81 mg tablet Take 81 mg by mouth daily.  carvedilol (COREG) 25 mg tablet Take 25 mg by mouth two (2) times daily (with meals).  potassium chloride (KLOR-CON M10) 10 mEq tablet Take 10 mEq by mouth two (2) times a day.  furosemide (LASIX) 20 mg tablet Take 20 mg by mouth daily.  amLODIPine (NORVASC) 5 mg tablet Take 5 mg by mouth daily. No current facility-administered medications for this visit. Vitals:    03/31/22 1038   BP: 98/62   Pulse: 68   Resp: 18   SpO2: 97%   Weight: 217 lb 6.4 oz (98.6 kg)   Height: 5' 8\" (1.727 m)       I have reviewed the nurses notes, vitals, problem list, allergy list, medical history, family, social history and medications. Review of Symptoms:    General: Pt denies excessive weight gain or loss. Pt is able to conduct ADL's  HEENT: Denies blurred vision, headaches, hearing loss, epistaxis and difficulty swallowing.   Respiratory: Denies cough, congestion, shortness of breath, TO, wheezing or stridor. Cardiovascular: Denies precordial pain, palpitations, edema or PND  Gastrointestinal: Denies poor appetite, indigestion, abdominal pain or blood in stool  Genitourinary: Denies hematuria, dysuria, increased urinary frequency  Musculoskeletal: Denies joint pain or swelling from muscles or joints  Neurologic: Denies tremor, paresthesias, headache, or sensory motor disturbance  Psychiatric: Denies confusion, insomnia, depression  Integumentray: Denies rash, itching or ulcers. Hematologic: Denies easy bruising, bleeding    Physical Exam:      General: Well developed, in no acute distress. HEENT: Eyes - PERRL, no jvd  Heart:  Normal S1/S2 negative S3 or S4. Regular, no murmur, gallop or rub. Respiratory: Clear bilaterally x 4, no wheezing or rales  Abdomen:   Soft, non-tender, bowel sounds are active. Extremities:  No edema, normal cap refill, no cyanosis. Musculoskeletal: No clubbing  Neuro: A&Ox3, speech clear, gait stable. Skin: Skin color is normal. No rashes or lesions.  Non diaphoretic, no ulcers or subcutaneous nodule  Vascular: 2+ pulses symmetric in all extremities  Psych - judgement intact and orientation is wnl     Cardiographics    Stress - infarct noted    Echo - lvef 25%    icd - 42 beat run of VT on 2/22    Results for orders placed or performed during the hospital encounter of 03/08/19   EKG, 12 LEAD, INITIAL   Result Value Ref Range    Ventricular Rate 71 BPM    Atrial Rate 71 BPM    P-R Interval 146 ms    QRS Duration 132 ms    Q-T Interval 440 ms    QTC Calculation (Bezet) 478 ms    Calculated P Axis 36 degrees    Calculated R Axis 164 degrees    Calculated T Axis -35 degrees    Diagnosis       Electronic ventricular pacemaker  When compared with ECG of 21-DEC-2018 11:39,  Electronic ventricular pacemaker has replaced Sinus rhythm  Confirmed by Suze Ceron ([id number]) on 3/11/2019 9:23:53 AM           Lab Results   Component Value Date/Time    WBC 4.8 01/19/2022 03:35 PM    HGB 16.2 01/19/2022 03:35 PM    HCT 49.2 01/19/2022 03:35 PM    PLATELET 717 80/56/1004 03:35 PM    MCV 86.8 01/19/2022 03:35 PM      Lab Results   Component Value Date/Time    Sodium 141 01/19/2022 03:35 PM    Potassium 3.7 01/19/2022 03:35 PM    Chloride 110 (H) 01/19/2022 03:35 PM    CO2 27 01/19/2022 03:35 PM    Anion gap 4 (L) 01/19/2022 03:35 PM    Glucose 130 (H) 01/19/2022 03:35 PM    BUN 11 01/19/2022 03:35 PM    Creatinine 1.15 01/19/2022 03:35 PM    BUN/Creatinine ratio 10 (L) 01/19/2022 03:35 PM    GFR est AA >60 01/19/2022 03:35 PM    GFR est non-AA >60 01/19/2022 03:35 PM    Calcium 9.3 01/19/2022 03:35 PM    Bilirubin, total 0.4 01/19/2022 03:35 PM    Alk. phosphatase 94 01/19/2022 03:35 PM    Protein, total 7.1 01/19/2022 03:35 PM    Albumin 3.5 01/19/2022 03:35 PM    Globulin 3.6 01/19/2022 03:35 PM    A-G Ratio 1.0 (L) 01/19/2022 03:35 PM    ALT (SGPT) 32 01/19/2022 03:35 PM         Assessment:     Assessment:        ICD-10-CM ICD-9-CM    1. Nonischemic cardiomyopathy (HCC)  I42.8 425.4    2. Essential hypertension, benign  I10 401.1    3. Cardiomyopathy, ischemic  I25.5 414.8    4. Systolic CHF, chronic (HCC)  I50.22 428.22      428.0    5. Paroxysmal atrial fibrillation (HCC)  I48.0 427.31    6. VT (ventricular tachycardia) (HCC)  I47.2 427.1      No orders of the defined types were placed in this encounter. Plan:   Janell Males. is in sinus sp af abl earlier this year. He has a cardiomyopathy and is on GDMT. He had a long run of VT on 2/22 and he is a candidate for a lhc with DR Marino Dunne. I discussed the risks/benefits/alternatives of the procedure with the patient. Risks include (but are not limited to) bleeding, heart block, infection, cva/mi/tamponade/death. The patient understands and agrees to proceed. Continue medical management for chf, af.    Thank you for allowing me to participate in Janell Clemons 's care.     Tony NGUYEN Leobardo Ernandez MD, Delmer Hui    On this date 03/31/2022 I have spent 40 minutes reviewing previous notes, test results and face to face with the patient discussing the diagnosis and importance of compliance with the treatment plan as well as documenting on the day of the visit.

## 2022-03-31 NOTE — PROGRESS NOTES
Chief Complaint   Patient presents with    Irregular Heart Beat     long run on VT detected on ICD - denies cardiac sx - never felt the VT      1. Have you been to the ER, urgent care clinic since your last visit? Hospitalized since your last visit? NO     2. Have you seen or consulted any other health care providers outside of the [address] [place] [address] since your last visit? Include any pap smears or colon screening.   NO

## 2022-03-31 NOTE — LETTER
3/31/2022    Patient: Shonda Esparza. YOB: 1950   Date of Visit: 3/31/2022     Jayden Licona MD  [address] [address]  Via Fax: [phone]    Dear Jayden Licona MD,      Thank you for referring Mr. Emeli Hernandez to [facility] for evaluation. My notes for this consultation are attached. If you have questions, please do not hesitate to call me. I look forward to following your patient along with you.       Sincerely,    Bladimir Hopkins MD

## 2022-03-31 NOTE — PROGRESS NOTES
Subjective:      Barby Vasquez is a 70 y.o. male is here for EP consult. Had long run of VT on 2/22.  Denies symptoms      Patient Active Problem List    Diagnosis Date Noted    Paroxysmal atrial fibrillation ([facility] 01/31/2022    A-fib ([address] 01/31/2022    S/P CABG x 5 08/20/2020    Biventricular ICD (implantable cardioverter-defibrillator) in place 08/20/2020    S/P cardiac cath 02/08/2019    Nonischemic cardiomyopathy ([address] 02/08/2019    Cardiomyopathy, ischemic 02/08/2019    Syncope 12/21/2018    ASHD (arteriosclerotic heart disease) 33/31/9075    Systolic CHF, chronic ([address] 09/17/2013    Essential hypertension, benign 09/17/2013    Mixed hyperlipidemia 09/17/2013      Abigail Perales MD  Past Medical History:   Diagnosis Date    Atrial fibrillation ([address]     CAD (coronary artery disease)     [facility] systolic HF [facility] failure) [facility]     Congestive heart failure ([address]     DM type 2 (diabetes mellitus, type 2) ([address]     Gout     HTN (hypertension)     Hypercholesterolemia     ICD (implantable cardioverter-defibrillator) in place     Nonischemic cardiomyopathy ([address] 2/8/2019    S/P cardiac cath 2/8/2019 2/8/19 cardiac cath with nonobstructive disease    Stroke [facility])     tia IN 2019    Syncope       Past Surgical History:   Procedure Laterality Date    HX CORONARY ARTERY BYPASS GRAFT  2005    HX HEART CATHETERIZATION  01/31/2022    A-Fib Ablation    HX PACEMAKER Left 03/08/2019    ICD    PA CARDIAC SURG PROCEDURE UNLIST      PA INSJ/RPLCMT PERM DFB W/TRNSVNS LDS 1/DUAL CHMBR N/A 3/8/2019    INSERT ICD BIV MULTI performed by She Leon [facility] at [facility] CARD[facility]AC [facility]     No Known Allergies   Family History   Problem Relation Age of Onset    Heart Disease Father     Heart Attack Father     Hypertension Brother     negative for cardiac disease  Social History     Socioeconomic History    Marital status:    Tobacco Use    Smoking status: Current Every Day Smoker Packs/day: 0.25     Years: 40.00     Pack years: 10.00     Types: Cigarettes    Smokeless tobacco: Never Used    Tobacco comment: 4-8 cigarettes a day   Vaping Use    Vaping Use: Never used   Substance and Sexual Activity    Alcohol use: Not Currently     Comment: NONE IN 3 MONTHS OR SO    Drug use: No    Sexual activity: Yes     Partners: Female     Current Outpatient Medications   Medication Sig    metFORMIN ER (GLUCOPHAGE XR) 500 mg tablet Take  by mouth. 3 tabs at night    warfarin (COUMADIN) 5 mg tablet TAKE 1 AND 1/2 TABLET ON FRIDAYS AND 1 TABLET ALL OTHER DAYS. (Patient taking differently: TAKE 1 AND 1/2 TABLET ON FRIDAYS AND SATURDAYS - 1 TABLET ALL OTHER DAYS.)    atorvastatin (LIPITOR) 10 mg tablet TAKE 1 TABLET EVERY DAY    glimepiride (AMARYL) 2 mg tablet Take 2 mg by mouth every morning.  ENTRESTO 24-26 mg tablet TAKE 1 TABLET BY MOUTH TWICE A DAY    ACCU-CHEK DAVE PLUS METER misc USE TO TEST BLOOD SUGAR    multivitamin (ONE A DAY) tablet Take 1 Tab by mouth daily.  aspirin delayed-release 81 mg tablet Take 81 mg by mouth daily.  carvedilol (COREG) 25 mg tablet Take 25 mg by mouth two (2) times daily (with meals).  potassium chloride (KLOR-CON M10) 10 mEq tablet Take 10 mEq by mouth two (2) times a day.  furosemide (LASIX) 20 mg tablet Take 20 mg by mouth daily.  amLODIPine (NORVASC) 5 mg tablet Take 5 mg by mouth daily. No current facility-administered medications for this visit. Vitals:    03/31/22 1038   BP: 98/62   Pulse: 68   Resp: 18   SpO2: 97%   Weight: 217 lb 6.4 oz (98.6 kg)   Height: 5' 8\" (1.727 m)       I have reviewed the nurses notes, vitals, problem list, allergy list, medical history, family, social history and medications. Review of Symptoms:    General: Pt denies excessive weight gain or loss. Pt is able to conduct ADL's  HEENT: Denies blurred vision, headaches, hearing loss, epistaxis and difficulty swallowing.   Respiratory: Denies cough, congestion, shortness of breath, TO, wheezing or stridor. Cardiovascular: Denies precordial pain, palpitations, edema or PND  Gastrointestinal: Denies poor appetite, indigestion, abdominal pain or blood in stool  Genitourinary: Denies hematuria, dysuria, increased urinary frequency  Musculoskeletal: Denies joint pain or swelling from muscles or joints  Neurologic: Denies tremor, paresthesias, headache, or sensory motor disturbance  Psychiatric: Denies confusion, insomnia, depression  Integumentray: Denies rash, itching or ulcers. Hematologic: Denies easy bruising, bleeding    Physical Exam:      General: Well developed, in no acute distress. HEENT: Eyes - PERRL, no jvd  Heart:  Normal S1/S2 negative S3 or S4. Regular, no murmur, gallop or rub. Respiratory: Clear bilaterally x 4, no wheezing or rales  Abdomen:   Soft, non-tender, bowel sounds are active. Extremities:  No edema, normal cap refill, no cyanosis. Musculoskeletal: No clubbing  Neuro: A&Ox3, speech clear, gait stable. Skin: Skin color is normal. No rashes or lesions.  Non diaphoretic, no ulcers or subcutaneous nodule  Vascular: 2+ pulses symmetric in all extremities  Psych - judgement intact and orientation is wnl     Cardiographics    Stress - infarct noted    Echo - lvef 25%    icd - 42 beat run of VT on 2/22    Results for orders placed or performed during the hospital encounter of 03/08/19   EKG, 12 LEAD, INITIAL   Result Value Ref Range    Ventricular Rate 71 BPM    Atrial Rate 71 BPM    P-R Interval 146 ms    QRS Duration 132 ms    Q-T Interval 440 ms    QTC Calculation (Bezet) 478 ms    Calculated P Axis 36 degrees    Calculated R Axis 164 degrees    Calculated T Axis -35 degrees    Diagnosis       Electronic ventricular pacemaker  When compared with ECG of 21-DEC-2018 11:39,  Electronic ventricular pacemaker has replaced Sinus rhythm  Confirmed by Aury Estevez ([id number]) on 3/11/2019 9:23:53 AM           Lab Results   Component Value Date/Time    WBC 4.8 01/19/2022 03:35 PM    HGB 16.2 01/19/2022 03:35 PM    HCT 49.2 01/19/2022 03:35 PM    PLATELET 026 46/65/2116 03:35 PM    MCV 86.8 01/19/2022 03:35 PM      Lab Results   Component Value Date/Time    Sodium 141 01/19/2022 03:35 PM    Potassium 3.7 01/19/2022 03:35 PM    Chloride 110 (H) 01/19/2022 03:35 PM    CO2 27 01/19/2022 03:35 PM    Anion gap 4 (L) 01/19/2022 03:35 PM    Glucose 130 (H) 01/19/2022 03:35 PM    BUN 11 01/19/2022 03:35 PM    Creatinine 1.15 01/19/2022 03:35 PM    BUN/Creatinine ratio 10 (L) 01/19/2022 03:35 PM    GFR est AA >60 01/19/2022 03:35 PM    GFR est non-AA >60 01/19/2022 03:35 PM    Calcium 9.3 01/19/2022 03:35 PM    Bilirubin, total 0.4 01/19/2022 03:35 PM    Alk. phosphatase 94 01/19/2022 03:35 PM    Protein, total 7.1 01/19/2022 03:35 PM    Albumin 3.5 01/19/2022 03:35 PM    Globulin 3.6 01/19/2022 03:35 PM    A-G Ratio 1.0 (L) 01/19/2022 03:35 PM    ALT (SGPT) 32 01/19/2022 03:35 PM         Assessment:     Assessment:        ICD-10-CM ICD-9-CM    1. Nonischemic cardiomyopathy (HCC)  I42.8 425.4    2. Essential hypertension, benign  I10 401.1    3. Cardiomyopathy, ischemic  I25.5 414.8    4. Systolic CHF, chronic (HCC)  I50.22 428.22      428.0    5. Paroxysmal atrial fibrillation (HCC)  I48.0 427.31    6. VT (ventricular tachycardia) (HCC)  I47.2 427.1      No orders of the defined types were placed in this encounter. Plan:   Scherry Flank. is in sinus sp af abl earlier this year. He has a cardiomyopathy and is on GDMT. He had a long run of VT on 2/22 and he is a candidate for a lhc with DR Maria D Blank. I discussed the risks/benefits/alternatives of the procedure with the patient. Risks include (but are not limited to) bleeding, heart block, infection, cva/mi/tamponade/death. The patient understands and agrees to proceed. Continue medical management for chf, af.    Thank you for allowing me to participate in Bijal Miramontes 's care.     Tony NGUYEN Randi Giang MD, Jelena Ramos    On this date 03/31/2022 I have spent 40 minutes reviewing previous notes, test results and face to face with the patient discussing the diagnosis and importance of compliance with the treatment plan as well as documenting on the day of the visit.

## 2022-04-01 ENCOUNTER — ANTI-COAG VISIT (OUTPATIENT)
Dept: CARDIOLOGY CLINIC | Age: 72
End: 2022-04-01
Payer: MEDICARE

## 2022-04-01 DIAGNOSIS — I48.0 PAROXYSMAL ATRIAL FIBRILLATION (HCC): ICD-10-CM

## 2022-04-01 DIAGNOSIS — Z79.01 LONG TERM (CURRENT) USE OF ANTICOAGULANTS: Primary | ICD-10-CM

## 2022-04-01 LAB
INR BLD: 2.4 (ref 1–1.5)
PT POC: 28.3 SECONDS (ref 9.1–12)
VALID INTERNAL CONTROL?: YES

## 2022-04-01 PROCEDURE — 85610 PROTHROMBIN TIME: CPT | Performed by: INTERNAL MEDICINE

## 2022-04-01 NOTE — PROGRESS NOTES
A full discussion of the nature of anticoagulants has been carried out. A benefit risk analysis has been presented to the patient, so that they understand the justification for choosing anticoagulation at this time. The need for frequent and regular monitoring, precise dosage adjustment and compliance is stressed. Side effects of potential bleeding are discussed. The patient should avoid any OTC items containing aspirin, naproxen or ibuprofen, and should avoid great swings in general diet. Avoid alcohol consumption. Advised to notify the office if antibiotic or steroid therapy is initiated. Call if any signs of abnormal bleeding are present. Next PT/INR test after cath.

## 2022-04-05 ENCOUNTER — HOSPITAL ENCOUNTER (OUTPATIENT)
Age: 72
Discharge: HOME | End: 2022-04-05
Attending: INTERNAL MEDICINE | Admitting: INTERNAL MEDICINE
Payer: MEDICARE

## 2022-04-05 VITALS
HEART RATE: 75 BPM | TEMPERATURE: 97.6 F | RESPIRATION RATE: 14 BRPM | SYSTOLIC BLOOD PRESSURE: 101 MMHG | BODY MASS INDEX: 32.89 KG/M2 | DIASTOLIC BLOOD PRESSURE: 66 MMHG | HEIGHT: 68 IN | WEIGHT: 217 LBS | OXYGEN SATURATION: 99 %

## 2022-04-05 DIAGNOSIS — R07.9 CHEST PAIN, UNSPECIFIED TYPE: ICD-10-CM

## 2022-04-05 LAB
ANION GAP SERPL CALC-SCNC: 7 MMOL/L (ref 5–15)
BUN SERPL-MCNC: 9 MG/DL (ref 6–20)
BUN/CREAT SERPL: 8 (ref 12–20)
CALCIUM SERPL-MCNC: 8.9 MG/DL (ref 8.5–10.1)
CHLORIDE SERPL-SCNC: 111 MMOL/L (ref 97–108)
CO2 SERPL-SCNC: 23 MMOL/L (ref 21–32)
CREAT SERPL-MCNC: 1.06 MG/DL (ref 0.7–1.3)
ERYTHROCYTE [DISTWIDTH] IN BLOOD BY AUTOMATED COUNT: 13.5 % (ref 11.5–14.5)
GLUCOSE BLD STRIP.AUTO-MCNC: 112 MG/DL (ref 65–117)
GLUCOSE SERPL-MCNC: 168 MG/DL (ref 65–100)
HCT VFR BLD AUTO: 49 % (ref 36.6–50.3)
HGB BLD-MCNC: 16.7 G/DL (ref 12.1–17)
INR PPP: 1.2 (ref 0.9–1.1)
MCH RBC QN AUTO: 28.8 PG (ref 26–34)
MCHC RBC AUTO-ENTMCNC: 34.1 G/DL (ref 30–36.5)
MCV RBC AUTO: 84.5 FL (ref 80–99)
NRBC # BLD: 0 K/UL (ref 0–0.01)
NRBC BLD-RTO: 0 PER 100 WBC
PLATELET # BLD AUTO: 143 K/UL (ref 150–400)
PMV BLD AUTO: 10.2 FL (ref 8.9–12.9)
POTASSIUM SERPL-SCNC: 3.8 MMOL/L (ref 3.5–5.1)
PROTHROMBIN TIME: 12 SEC (ref 9–11.1)
RBC # BLD AUTO: 5.8 M/UL (ref 4.1–5.7)
SERVICE CMNT-IMP: NORMAL
SODIUM SERPL-SCNC: 141 MMOL/L (ref 136–145)
WBC # BLD AUTO: 4.7 K/UL (ref 4.1–11.1)

## 2022-04-05 PROCEDURE — 74011000636 HC RX REV CODE- 636: Performed by: INTERNAL MEDICINE

## 2022-04-05 PROCEDURE — C1769 GUIDE WIRE: HCPCS | Performed by: INTERNAL MEDICINE

## 2022-04-05 PROCEDURE — 76937 US GUIDE VASCULAR ACCESS: CPT | Performed by: INTERNAL MEDICINE

## 2022-04-05 PROCEDURE — 85027 COMPLETE CBC AUTOMATED: CPT

## 2022-04-05 PROCEDURE — 77010033678 HC OXYGEN DAILY

## 2022-04-05 PROCEDURE — 77030042317 HC BND COMPR HEMSTAT -B: Performed by: INTERNAL MEDICINE

## 2022-04-05 PROCEDURE — 80048 BASIC METABOLIC PNL TOTAL CA: CPT

## 2022-04-05 PROCEDURE — 74011000250 HC RX REV CODE- 250: Performed by: INTERNAL MEDICINE

## 2022-04-05 PROCEDURE — 99152 MOD SED SAME PHYS/QHP 5/>YRS: CPT | Performed by: INTERNAL MEDICINE

## 2022-04-05 PROCEDURE — 85610 PROTHROMBIN TIME: CPT

## 2022-04-05 PROCEDURE — 36415 COLL VENOUS BLD VENIPUNCTURE: CPT

## 2022-04-05 PROCEDURE — 2709999900 HC NON-CHARGEABLE SUPPLY: Performed by: INTERNAL MEDICINE

## 2022-04-05 PROCEDURE — 77030016704 HC CATH ANGI DX PRF1 MRTM -B: Performed by: INTERNAL MEDICINE

## 2022-04-05 PROCEDURE — C1894 INTRO/SHEATH, NON-LASER: HCPCS | Performed by: INTERNAL MEDICINE

## 2022-04-05 PROCEDURE — 77030010221 HC SPLNT WR POS TELE -B: Performed by: INTERNAL MEDICINE

## 2022-04-05 PROCEDURE — 77030019698 HC SYR ANGI MDLON MRTM -A: Performed by: INTERNAL MEDICINE

## 2022-04-05 PROCEDURE — 77030008543 HC TBNG MON PRSS MRTM -A: Performed by: INTERNAL MEDICINE

## 2022-04-05 PROCEDURE — 74011250636 HC RX REV CODE- 250/636: Performed by: INTERNAL MEDICINE

## 2022-04-05 PROCEDURE — 82962 GLUCOSE BLOOD TEST: CPT

## 2022-04-05 PROCEDURE — 93459 L HRT ART/GRFT ANGIO: CPT | Performed by: INTERNAL MEDICINE

## 2022-04-05 RX ORDER — HEPARIN SODIUM 200 [USP'U]/100ML
INJECTION, SOLUTION INTRAVENOUS
Status: COMPLETED | OUTPATIENT
Start: 2022-04-05 | End: 2022-04-05

## 2022-04-05 RX ORDER — LIDOCAINE HYDROCHLORIDE 10 MG/ML
INJECTION, SOLUTION EPIDURAL; INFILTRATION; INTRACAUDAL; PERINEURAL AS NEEDED
Status: DISCONTINUED | OUTPATIENT
Start: 2022-04-05 | End: 2022-04-05 | Stop reason: HOSPADM

## 2022-04-05 RX ORDER — FENTANYL CITRATE 50 UG/ML
INJECTION, SOLUTION INTRAMUSCULAR; INTRAVENOUS AS NEEDED
Status: DISCONTINUED | OUTPATIENT
Start: 2022-04-05 | End: 2022-04-05 | Stop reason: HOSPADM

## 2022-04-05 RX ORDER — VERAPAMIL HYDROCHLORIDE 2.5 MG/ML
INJECTION, SOLUTION INTRAVENOUS AS NEEDED
Status: DISCONTINUED | OUTPATIENT
Start: 2022-04-05 | End: 2022-04-05 | Stop reason: HOSPADM

## 2022-04-05 RX ORDER — HEPARIN SODIUM 1000 [USP'U]/ML
INJECTION, SOLUTION INTRAVENOUS; SUBCUTANEOUS AS NEEDED
Status: DISCONTINUED | OUTPATIENT
Start: 2022-04-05 | End: 2022-04-05 | Stop reason: HOSPADM

## 2022-04-05 RX ORDER — MIDAZOLAM HYDROCHLORIDE 1 MG/ML
INJECTION, SOLUTION INTRAMUSCULAR; INTRAVENOUS AS NEEDED
Status: DISCONTINUED | OUTPATIENT
Start: 2022-04-05 | End: 2022-04-05 | Stop reason: HOSPADM

## 2022-04-05 NOTE — PROGRESS NOTES
Marla Akhtar. is recovering post-diagnostic [facility]. Right radial site dressing is CDI without swelling or bleeding. VSS. Rhythm AV paced      Immanuel Rivero Jr. denies complaints at this time. If recovery continues to progress without complication, discharge is planned for later today. No changes to medications, F/U scheduled, ok for DC this evening.      Fabiola Dior NP  DNP, RN, AGACNP-BC

## 2022-04-05 NOTE — PROGRESS NOTES
1400 Pt arrived to IVCU from [address] [place] [address]. R radial cath site CDI, no hematoma. TR band patent at 10ml. Pulses palpable. VSS. 1505 TR band removed with no difficulty. Gauze and tegaderm applied. 1745 Discharge instructions discussed with patient. All questions answered. Patient verbalized understanding. Cath site CDI, no hematoma. Care instructions and restrictions reviewed. Patient instructed to make follow up appts per discharge instructions. Patient signed discharge instructions after reviewing them and duplicate copy placed in chart. Belongings gathered and accounted for. Telemetry monitor and IV removed. Tolerating ambulation in room and hallway. Denies CP, SOB, or dizziness. Escorted out via w/c, discharged home with family in a private vehicle.

## 2022-04-05 NOTE — Clinical Note
TRANSFER - OUT REPORT:     Verbal report given to: Linda Ambrosio. Report consisted of patient's Situation, Background, Assessment and   Recommendations(SBAR). Opportunity for questions and clarification was provided.

## 2022-04-05 NOTE — PROGRESS NOTES
Brief Procedure Note    Patient: Crystal Escobar MRN: [id number]  SSN: [id number]    YOB: 1950  Age: 70 y.o. Sex: male      Date of Procedure: 4/5/2022     Pre-procedure Diagnosis: NSVT, cardiomyopathy    Post-procedure Diagnosis: NSVT, cardiomyopathy    Procedure: LHC    Performed By: Iftikhar Van MD     Anesthesia: Moderate Sedation    Estimated Blood Loss: Less than 10 mL      Specimens:  None    Findings: LM normal, % ostial, ramus normal, OM1 100%, RCA normal, SVG to LAD/OM patent, LIMA chronically occluded, LVEF 15%. Complications: None    Implants: None    Recommendations: Continue medical therapy.     Signed By: Iftikhar Van MD     April 5, 2022

## 2022-04-05 NOTE — Clinical Note
Multiple views of the saphenous vein graft to the left anterior descending and obtuse marginal obtained using hand injection.

## 2022-04-05 NOTE — DISCHARGE INSTRUCTIONS
[address], [place], [facility]  [phone]        Patient ID:  Blanca Palacios  [id number]  70 y.o.  1950    Admit Date: 4/5/2022    Discharge Date: 4/5/2022     Admitting Physician: Nicole Benítez MD     Discharge Physician: Nicole Benítez MD    Admission Diagnoses:   Chest pain, unspecified type [R07.9]    Discharge Diagnoses:   Diagnostic [facility]    Discharge Condition: Good    Cardiology Procedures this Admission:  Diagnostic left heart catheterization    Disposition: home    Reference discharge instructions provided by nursing for diet and activity. Signed:  Kym Mcmillan NP  4/5/2022  2:39 PM        Radial Cardiac Catheterization/Angiography Discharge Instructions    It is normal to feel tired the first couple days. Take it easy and follow the physicians instructions. CHECK THE CATHETER INSERTION SITE DAILY:    Remove the wrist dressing 24 hours after the procedure. You may shower 24 hours after the procedure. Wash with soap and water and pat dry. Gentle cleaning of the site with soap and water is sufficient, cover with a dry clean dressing or bandage. Do not apply creams or powders to the area. No soaking the wrist for 3 days. Leave the puncture site open to air after 24 hours post-procedure. CALL THE PHYSICIANS:     If the site becomes red, swollen or feels warm to the touch  If there is bleeding or drainage or if there is unusual pain at the radial site. If there is any minor oozing, you may apply a band-aid and remove after 12 hours. If the bleeding continues, hold pressure with the middle finger against the puncture site and the thumb against the back of the wrist,call 911 to be transported to the hospital.  DO NOT DRIVE YOURSELF, [address].     ACTIVITY:   For the first 24 hours do not manipulate the wrist.  No lifting, pushing or pulling over 3-5 pounds with the affected wrist for 7 daysand no straining the insertion site. Do not life grocery bags or the garbage can, do not run the vacuum  or  for 7 days. Start with short walks as in the hospital and gradually increase as tolerated each day. It is recommended to walk 30 minutes 5-7 days per week. Follow your physicians instructions on activity. Avoid walking outside in extremes of heat or cold. Walk inside when it is cold and windy or hot and humid. Things to keep in mind:  No driving for at least 24 hours, or as designated by your physician. Limit the number of times you go up and down the stairs  Take rests and pace yourself with activity. Be careful and do not strain with bowel movements. MEDICATIONS:    Take all medications as prescribed  Call your physician if you have any questions  Keep an updated list of your medications with you at all times and give a list to your physician and pharmacist    SIGNS AND SYMPTOMS:   Be cautious of symptoms of angina or recurrent symptoms such as chest discomfort, unusual shortness of breath or fatigue. These could be symptoms of restenosis, a new blockage or a heart attack. If your symptoms are relieved with rest it is still recommended that you notify your physician of recurrent chest pain or discomfort. For CHEST PAIN or symptoms of angina not relieved with rest:  If the discomfort is not relieved with rest, and you have been prescribed Nitroglycerin, take as directed (taken under the tongue, one at a time 5 minutes apart for a total of 3 doses). If the discomfort is not relieved after the 3rd nitroglycerin, call 911. If you have not been prescribed Nitroglycerin  and your chest discomfort is not relieved with rest, call 911. AFTER CARE:   Follow up with your physician as instructed. Follow a heart healthy diet with proper portion control, daily stress management, daily exercise, blood pressure and cholesterol control , and smoking cessation.     HOLD METFORMIN UNTIL 4/7/2022

## 2022-04-05 NOTE — INTERVAL H&P NOTE
Update History & Physical    The Patient's History and Physical of March 31, 2022 was reviewed with the patient and I examined the patient. There was no change. The surgical site was confirmed by the patient and me. Plan:  The risk, benefits, expected outcome, and alternative to the recommended procedure have been discussed with the patient. Patient understands and wants to proceed with the procedure.     Electronically signed by Gee Lechuga MD on 4/5/2022 at 12:49 PM

## 2022-04-12 ENCOUNTER — OFFICE VISIT (OUTPATIENT)
Dept: CARDIOLOGY CLINIC | Age: 72
End: 2022-04-12
Payer: MEDICARE

## 2022-04-12 VITALS
WEIGHT: 218.6 LBS | OXYGEN SATURATION: 98 % | HEART RATE: 70 BPM | RESPIRATION RATE: 16 BRPM | HEIGHT: 68 IN | SYSTOLIC BLOOD PRESSURE: 120 MMHG | DIASTOLIC BLOOD PRESSURE: 70 MMHG | BODY MASS INDEX: 33.13 KG/M2

## 2022-04-12 DIAGNOSIS — I10 ESSENTIAL HYPERTENSION, BENIGN: ICD-10-CM

## 2022-04-12 DIAGNOSIS — E78.2 MIXED HYPERLIPIDEMIA: ICD-10-CM

## 2022-04-12 DIAGNOSIS — Z09 HOSPITAL DISCHARGE FOLLOW-UP: ICD-10-CM

## 2022-04-12 DIAGNOSIS — I25.10 ASHD (ARTERIOSCLEROTIC HEART DISEASE): Primary | ICD-10-CM

## 2022-04-12 DIAGNOSIS — I25.5 CARDIOMYOPATHY, ISCHEMIC: ICD-10-CM

## 2022-04-12 PROCEDURE — G8754 DIAS BP LESS 90: HCPCS | Performed by: INTERNAL MEDICINE

## 2022-04-12 PROCEDURE — 99214 OFFICE O/P EST MOD 30 MIN: CPT | Performed by: INTERNAL MEDICINE

## 2022-04-12 PROCEDURE — 93000 ELECTROCARDIOGRAM COMPLETE: CPT | Performed by: INTERNAL MEDICINE

## 2022-04-12 PROCEDURE — G8417 CALC BMI ABV UP PARAM F/U: HCPCS | Performed by: INTERNAL MEDICINE

## 2022-04-12 PROCEDURE — 3017F COLORECTAL CA SCREEN DOC REV: CPT | Performed by: INTERNAL MEDICINE

## 2022-04-12 PROCEDURE — G8510 SCR DEP NEG, NO PLAN REQD: HCPCS | Performed by: INTERNAL MEDICINE

## 2022-04-12 PROCEDURE — G8752 SYS BP LESS 140: HCPCS | Performed by: INTERNAL MEDICINE

## 2022-04-12 PROCEDURE — 1101F PT FALLS ASSESS-DOCD LE1/YR: CPT | Performed by: INTERNAL MEDICINE

## 2022-04-12 PROCEDURE — G8427 DOCREV CUR MEDS BY ELIG CLIN: HCPCS | Performed by: INTERNAL MEDICINE

## 2022-04-12 PROCEDURE — 1111F DSCHRG MED/CURRENT MED MERGE: CPT | Performed by: INTERNAL MEDICINE

## 2022-04-12 PROCEDURE — G8536 NO DOC ELDER MAL SCRN: HCPCS | Performed by: INTERNAL MEDICINE

## 2022-04-12 RX ORDER — SACUBITRIL AND VALSARTAN 49; 51 MG/1; MG/1
1 TABLET, FILM COATED ORAL 2 TIMES DAILY
Qty: 180 TABLET | Refills: 3 | Status: SHIPPED | OUTPATIENT
Start: 2022-04-12 | End: 2022-04-13 | Stop reason: SDUPTHER

## 2022-04-12 NOTE — PROGRESS NOTES
Chief Complaint   Patient presents with   [facility] Follow Up     LM normal, % ostial, ramus normal, OM1 100%, RCA normal, SVG to LAD/OM patent, LIMA chronically occluded, LVEF 15%.  Hypertension    Cardiomyopathy    Irregular Heart Beat     1. Have you been to the ER, urgent care clinic since your last visit? No Hospitalized since your last visit? Yes 4/5/22    2. Have you seen or consulted any other health care providers outside of the [address] [place] [address] since your last visit? No  Include any pap smears or colon screening.   No

## 2022-04-12 NOTE — PROGRESS NOTES
Subjective/HPI:     Suad Muniz is a 70 y.o. male is here for routine f/u. He has a PMHx of ASHD s/p CABG, chronic systolic CHF, afib, ICD. Recent device interroogation showed NSVT. Follow up cath showed patent grafts, EF 15%. PCP Provider  Nina Randall MD    Past Medical History:   Diagnosis Date    [facility]     CAD (coronary artery disease)     [facility]     Congestive heart failure ([facility]     DM type 2 (diabetes mellitus, type 2) ([facility]     Gout     HTN (hypertension)     Hypercholesterolemia     ICD (implantable cardioverter-defibrillator) in place     Long term current use of anticoagulant therapy     ASA & Warfarin    Nonischemic cardiomyopathy ([facility] 2/8/2019    S/P cardiac cath 2/8/2019 2/8/19 cardiac cath with nonobstructive disease    Stroke [facility])     tia IN 2019    Syncope         No Known Allergies     Outpatient Encounter Medications as of 4/12/2022   Medication Sig Dispense Refill    metFORMIN ER (GLUCOPHAGE XR) 500 mg tablet Take  by mouth. 3 tabs at night      warfarin (COUMADIN) 5 mg tablet TAKE 1 AND 1/2 TABLET ON FRIDAYS AND 1 TABLET ALL OTHER DAYS. (Patient taking differently: TAKE 1 AND 1/2 TABLET ON FRIDAYS AND SATURDAYS - 1 TABLET ALL OTHER DAYS.) 102 Tablet 1    atorvastatin (LIPITOR) 10 mg tablet TAKE 1 TABLET EVERY DAY 90 Tab 0    glimepiride (AMARYL) 2 mg tablet Take 2 mg by mouth every morning.  ENTRESTO 24-26 mg tablet TAKE 1 TABLET BY MOUTH TWICE A DAY 60 Tab 1    ACCU-CHEK DAVE PLUS METER [facility] USE TO TEST BLOOD SUGAR  0    multivitamin (ONE A DAY) tablet Take 1 Tab by mouth daily.  aspirin delayed-release 81 mg tablet Take 81 mg by mouth daily.  carvedilol (COREG) 25 mg tablet Take 25 mg by mouth two (2) times daily (with meals).  potassium chloride (KLOR-CON M10) 10 mEq tablet Take 10 mEq by mouth two (2) times a day.       furosemide (LASIX) 20 mg tablet Take 20 mg by mouth daily.  amLODIPine (NORVASC) 5 mg tablet Take 5 mg by mouth daily. No facility-administered encounter medications on file as of 4/12/2022. Review of Symptoms:    Review of Systems   Constitutional: Negative. Negative for chills and fever. HENT: Negative. Negative for hearing loss. Respiratory: Negative. Negative for cough, hemoptysis and shortness of breath. Cardiovascular: Negative. Negative for chest pain, palpitations, orthopnea, claudication, leg swelling and PND. Gastrointestinal: Negative. Negative for nausea and vomiting. Musculoskeletal: Negative for myalgias. Skin: Negative. Negative for rash. Neurological: Negative. Negative for dizziness, loss of consciousness and headaches. Physical Exam:      General: Well developed, in no acute distress, cooperative and alert  HEENT: No carotid bruits, no JVD, trach is midline. Neck Supple, PEERL, EOM intact. Heart:  reg rate and rhythm; normal S1/S2; no murmurs, no gallops or rubs. Respiratory: Clear bilaterally x 4, no wheezing or rales  Abdomen:   Soft, non-tender, no distention, no masses. + BS. Extremities:  Normal cap refill, no cyanosis, atraumatic. No edema. Neuro: A&Ox3, speech clear, gait stable. Skin: Skin color is normal. No rashes or lesions.  Non diaphoretic  Vascular: 2+ pulses symmetric in all extremities    Visit Vitals  /70 (BP 1 Location: Right upper arm, BP Patient Position: Sitting, BP Cuff Size: Large adult)   Pulse 70   Resp 16   Ht 5' 8\" (1.727 m)   Wt 218 lb 9.6 oz (99.2 kg)   SpO2 98%   BMI 33.24 kg/m²       ECG: sinus [facility]    Cardiology Labs:    Lab Results   Component Value Date/Time    Cholesterol, total 94 (L) 02/07/2019 08:09 AM    HDL Cholesterol 31 (L) 02/07/2019 08:09 AM    LDL, calculated 28 02/07/2019 08:09 AM    LDL, calculated 64 02/20/2018 10:21 AM    VLDL, calculated 35 02/07/2019 08:09 AM       Lab Results   Component Value Date/Time    Hemoglobin A1c 9.0 (H) 12/21/2018 06:45 PM       Lab Results   Component Value Date/Time    Sodium 141 04/05/2022 11:07 AM    Potassium 3.8 04/05/2022 11:07 AM    Chloride 111 (H) 04/05/2022 11:07 AM    CO2 23 04/05/2022 11:07 AM    Glucose 168 (H) 04/05/2022 11:07 AM    BUN 9 04/05/2022 11:07 AM    Creatinine 1.06 04/05/2022 11:07 AM    BUN/Creatinine ratio 8 (L) 04/05/2022 11:07 AM    GFR est AA >60 04/05/2022 11:07 AM    GFR est non-AA >60 04/05/2022 11:07 AM    Calcium 8.9 04/05/2022 11:07 AM    Anion gap 7 04/05/2022 11:07 AM    Bilirubin, total 0.4 01/19/2022 03:35 PM    ALT (SGPT) 32 01/19/2022 03:35 PM    Alk. phosphatase 94 01/19/2022 03:35 PM    Protein, total 7.1 01/19/2022 03:35 PM    Albumin 3.5 01/19/2022 03:35 PM    Globulin 3.6 01/19/2022 03:35 PM    A-G Ratio 1.0 (L) 01/19/2022 03:35 PM          Assessment:       ICD-10-CM ICD-9-CM    1. ASHD (arteriosclerotic heart disease)  I25.10 414.00 AMB POC EKG ROUTINE W/ 12 LEADS, INTER & REP   2. Cardiomyopathy, ischemic  I25.5 414.8 AMB POC EKG ROUTINE W/ 12 LEADS, INTER & REP   3. Essential hypertension, benign  I10 401.1 AMB POC EKG ROUTINE W/ 12 LEADS, INTER & REP   4. Mixed hyperlipidemia  E78.2 272.2 AMB POC EKG ROUTINE W/ 12 LEADS, INTER & REP        Plan:     1. ASHD (arteriosclerotic heart disease)  Stable, patent grafts. - AMB POC EKG ROUTINE W/ 12 LEADS, INTER & REP    2. Cardiomyopathy, ischemic  Compensated. Will increase entersto dose. Consider farxiga.  - AMB POC EKG ROUTINE W/ 12 LEADS, INTER & REP    3. Essential hypertension, benign  Well controlled. - AMB POC EKG ROUTINE W/ 12 LEADS, INTER & REP    4. Mixed hyperlipidemia  Continue statin  Lipids at goal.  - AMB POC EKG ROUTINE W/ 12 LEADS, INTER & REP    F/u 4-6 weeks.       Jose Arreguin MD

## 2022-04-12 NOTE — LETTER
4/12/2022    Patient: Mike Kirkland. YOB: 1950   Date of Visit: 4/12/2022     Bryanna Archibald MD  [address] [address] [address] [address]  Via Fax: [phone]    Dear Bryanna Archibald MD,      Thank you for referring Mr. Vanessa Buitrago to [place] [facility] for evaluation. My notes for this consultation are attached. If you have questions, please do not hesitate to call me. I look forward to following your patient along with you.       Sincerely,    sUha Martinez MD

## 2022-04-13 ENCOUNTER — TELEPHONE (OUTPATIENT)
Dept: CARDIOLOGY CLINIC | Age: 72
End: 2022-04-13

## 2022-04-13 DIAGNOSIS — Z79.01 LONG TERM (CURRENT) USE OF ANTICOAGULANTS: Primary | ICD-10-CM

## 2022-04-13 RX ORDER — SACUBITRIL AND VALSARTAN 49; 51 MG/1; MG/1
1 TABLET, FILM COATED ORAL 2 TIMES DAILY
Qty: 180 TABLET | Refills: 3 | Status: SHIPPED | OUTPATIENT
Start: 2022-04-13

## 2022-04-13 NOTE — TELEPHONE ENCOUNTER
Patients wife called about the entresto . Dr. Darlene Castillo gave the patient a card for $10 copay but [address] [place] [address] Dr will not accept it. Can you send a new prescription to the [facility] on [address] because the will accept the card that was given to him.  Call patient wife Triston Resendiz if any questions @ [phone]    Dany Renner

## 2022-04-13 NOTE — TELEPHONE ENCOUNTER
Patient call requesting to resend prescription from [facility] to [facility] pharmacy on [address] in order to use coupon given.

## 2022-04-14 NOTE — TELEPHONE ENCOUNTER
New prescription sent to [facility] pharmacy for patient to use coupon. Patient was called and informed.

## 2022-04-15 ENCOUNTER — ANTI-COAG VISIT (OUTPATIENT)
Dept: CARDIOLOGY CLINIC | Age: 72
End: 2022-04-15
Payer: MEDICARE

## 2022-04-15 DIAGNOSIS — Z79.01 LONG TERM (CURRENT) USE OF ANTICOAGULANTS: Primary | ICD-10-CM

## 2022-04-15 DIAGNOSIS — I48.0 PAROXYSMAL ATRIAL FIBRILLATION (HCC): ICD-10-CM

## 2022-04-15 LAB
INR BLD: 2.3 (ref 1–1.5)
PT POC: 27.3 SECONDS (ref 9.1–12)
VALID INTERNAL CONTROL?: YES

## 2022-04-15 PROCEDURE — 85610 PROTHROMBIN TIME: CPT | Performed by: INTERNAL MEDICINE

## 2022-04-29 DIAGNOSIS — I48.0 PAROXYSMAL ATRIAL FIBRILLATION (HCC): Primary | ICD-10-CM

## 2022-05-12 ENCOUNTER — ANTI-COAG VISIT (OUTPATIENT)
Dept: PHARMACY | Age: 72
End: 2022-05-12
Payer: MEDICARE

## 2022-05-12 VITALS
BODY MASS INDEX: 33.04 KG/M2 | WEIGHT: 218 LBS | HEART RATE: 69 BPM | HEIGHT: 68 IN | DIASTOLIC BLOOD PRESSURE: 84 MMHG | SYSTOLIC BLOOD PRESSURE: 113 MMHG

## 2022-05-12 DIAGNOSIS — Z79.01 LONG TERM (CURRENT) USE OF ANTICOAGULANTS: Primary | ICD-10-CM

## 2022-05-12 DIAGNOSIS — I48.0 PAF (PAROXYSMAL ATRIAL FIBRILLATION) (HCC): ICD-10-CM

## 2022-05-12 LAB
INR BLD: 2.7
INR BLD: 2.7
PT POC: 32.7 SECONDS
PT POC: 32.7 SECONDS
VALID INTERNAL CONTROL?: YES
VALID INTERNAL CONTROL?: YES

## 2022-05-12 PROCEDURE — 85610 PROTHROMBIN TIME: CPT

## 2022-05-12 PROCEDURE — 99211 OFF/OP EST MAY X REQ PHY/QHP: CPT

## 2022-05-12 NOTE — PROGRESS NOTES
Pharmacy Progress Note - Anticoagulation Management    S/O:  Mr. Elly Farmer  is a 70 y.o. male seen today for anticoagulation management for the diagnosis of Atrial Fibrillation. HPI: At last visit (4/15) INR was 2.3 and therapeutic per chart review. Interim History:    · Warfarin start date: Prior to 3/6/20 per chart review. · INR Goal:  2.0-3.0    · Current warfarin regimen: 7.5 mg Fri, Sat; 5 mg daily ROW                      · Warfarin tablet strength:   5 mg   · Duration of therapy: Indefinite    Today's pertinent positives includes:  No significant changes since last visit        · Adherence:   · Able to recall regimen? YES  · Miss/extra dose? NO  · Need refill? NO    Upcoming procedure(s):  N/A      Past Medical History:   Diagnosis Date    At[facility] fibrillation [facility] [address]     CAD (coronary artery disease)     [facility]     Congestive heart failure ([facility] [address]     DM type 2 (diabetes mellitus, type 2) ([facility]     Gout     HTN (hypertension)     Hypercholesterolemia     ICD (implantable cardioverter-defibrillator) in place     Long term current use of anticoagulant therapy     ASA & Warfarin    Nonischemic cardiomyopathy ([facility] [address] 2/8/2019    S/P cardiac cath 2/8/2019 2/8/19 cardiac cath with nonobstructive disease    Stroke [facility])     tia IN 2019    Syncope      No Known Allergies  Current Outpatient Medications   Medication Sig    sacubitriL-valsartan (Entresto) 49-51 mg tab tablet Take 1 Tablet by mouth two (2) times a day.  metFORMIN ER (GLUCOPHAGE XR) 500 mg tablet Take  by mouth. 3 tabs at night    warfarin (COUMADIN) 5 mg tablet TAKE 1 AND 1/2 TABLET ON FRIDAYS AND 1 TABLET ALL OTHER DAYS. (Patient taking differently: TAKE 1 AND 1/2 TABLET ON FRIDAYS AND SATURDAYS - 1 TABLET ALL OTHER DAYS.)    atorvastatin (LIPITOR) 10 mg tablet TAKE 1 TABLET EVERY DAY    glimepiride (AMARYL) 2 mg tablet Take 2 mg by mouth every morning.     ACCU-CHEK DAVE PLUS METER misc USE TO TEST BLOOD SUGAR    multivitamin (ONE A DAY) tablet Take 1 Tab by mouth daily.  aspirin delayed-release 81 mg tablet Take 81 mg by mouth daily.  carvedilol (COREG) 25 mg tablet Take 25 mg by mouth two (2) times daily (with meals).  potassium chloride (KLOR-CON M10) 10 mEq tablet Take 10 mEq by mouth two (2) times a day.  furosemide (LASIX) 20 mg tablet Take 20 mg by mouth daily.  amLODIPine (NORVASC) 5 mg tablet Take 5 mg by mouth daily. No current facility-administered medications for this visit. Wt Readings from Last 3 Encounters:   05/12/22 218 lb (98.9 kg)   04/12/22 218 lb 9.6 oz (99.2 kg)   04/05/22 217 lb (98.4 kg)     BP Readings from Last 3 Encounters:   05/12/22 113/84   04/12/22 120/70   04/05/22 101/66     Pulse Readings from Last 3 Encounters:   05/12/22 69   04/12/22 70   04/05/22 75     Lab Results   Component Value Date/Time    WBC 4.7 04/05/2022 11:07 AM    HGB 16.7 04/05/2022 11:07 AM    HCT 49.0 04/05/2022 11:07 AM    PLATELET 414 (L) 71/35/1519 11:07 AM    MCV 84.5 04/05/2022 11:07 AM     Lab Results   Component Value Date/Time    Sodium 141 04/05/2022 11:07 AM    Potassium 3.8 04/05/2022 11:07 AM    Chloride 111 (H) 04/05/2022 11:07 AM    CO2 23 04/05/2022 11:07 AM    Anion gap 7 04/05/2022 11:07 AM    Glucose 168 (H) 04/05/2022 11:07 AM    BUN 9 04/05/2022 11:07 AM    Creatinine 1.06 04/05/2022 11:07 AM    BUN/Creatinine ratio 8 (L) 04/05/2022 11:07 AM    GFR est AA >60 04/05/2022 11:07 AM    GFR est non-AA >60 04/05/2022 11:07 AM    Calcium 8.9 04/05/2022 11:07 AM    Bilirubin, total 0.4 01/19/2022 03:35 PM    Alk. phosphatase 94 01/19/2022 03:35 PM    Protein, total 7.1 01/19/2022 03:35 PM    Albumin 3.5 01/19/2022 03:35 PM    Globulin 3.6 01/19/2022 03:35 PM    A-G Ratio 1.0 (L) 01/19/2022 03:35 PM    ALT (SGPT) 32 01/19/2022 03:35 PM     Estimated Creatinine Clearance: 72.9 mL/min (by C-G formula based on SCr of 1.06 mg/dL).       INR History:   (normal INR range 0.8-1.2)     Date   INR   PT   Dose/Comments  05/12/22 2.7  32.7 7.5 mg Fri, Sat; 5 mg daily ROW  04/15/22 2.3  27.3 7.5 mg Fri, Sat; 5 mg daily ROW    · Medications that can increase  INR: multivitamin, glimepiride  · Medications that can decrease INR: metformin    A/P:       Anticoagulation:  Considering Mr. Harmony Dennison past history, todays findings, and per Anticoagulation Collaborative Practice Agreement/Protocol:    1. Fingerstick POC INR (2.7) is Therapeutic for INR goal today. 2.  Continue warfarin 7.5 mg Fri, Sat; 5 mg daily ROW  3. INR is 2.7 and therapeutic. Patient denies changes to his medications and reports consistent intake of vitamin K foods. Patient will continue current regimen and recheck INR in 4 weeks. Patient was instructed to schedule an appointment in 4 week(s) prior to leaving the clinic. Medication reconciliation was completed during the visit. There are no discontinued medications. A full discussion of the nature of anticoagulants has been carried out. A full discussion of the need for frequent and regular monitoring, precise dosage adjustment and compliance was stressed. Side effects of potential bleeding were discussed and Mr. Rivero was instructed to call [phone] if there are any signs of abnormal bleeding. Mr. Prasad Hayden was instructed to avoid any OTC items containing aspirin or ibuprofen and prior to starting any new OTC products to consult with his physician or pharmacist to ensure no drug interactions are present. Mr. Prasad Hayden was instructed to avoid any major changes in his general diet and to avoid alcohol consumption. Mr. Prasad Hayden was provided information in the AVS that includes topics on understanding coumadin therapy, drug interaction considerations, vitamin K and coumadin use, interactions with foods and supplements containing vitamin K, and the use of herbal products.     Mr. Prasad Hayden verbalized his understanding of all instructions and will call the office with any questions, concerns, or signs of abnormal bleeding or blood clot. Notifications of recommendations will be sent to Dr. Hortencia Lorenzo for review.     Thank you,   Kieran Patient, [address] Tracking Only     Intervention Detail: Adherence Monitorin and Lab(s) Ordered   Total # of Interventions Recommended: 2   Total # of Interventions Accepted: 2   Time Spent (min): 20

## 2022-05-12 NOTE — PATIENT INSTRUCTIONS
Today your INR was 2.7 . Your goal INR is  2.0-3.0 . You have a 5 mg tablet of Coumadin (warfarin). Take Coumadin (warfarin) as follows: Take 7.5 mg (1.5 tablets) every Friday, Saturday; 5 mg daily rest of week. Come back in 4 week(s) for your next finger stick/INR blood test.        Avoid any over the counter items containing aspirin or ibuprofen, and avoid great swings in general diet. Avoid alcohol consumption. Please notify the INR nurse if you are started on any new medication including over the counter or herbal supplements. Also, please notify your INR nurse if any of your other prescription or over the counter medications have been discontinued. Call [facility] at [phone] if you have any signs of abnormal bleeding/blood clot.  ------------------------------------------------------------------------------------------------------------------  Taking Warfarin Safely: Care Instructions    Your Care Instructions  Warfarin is a medicine that you take to prevent blood clots. It is often called a blood thinner. Doctors give warfarin (such as Coumadin) to reduce the risk of blood clots. You may be at risk for blood clots if you have atrial fibrillation or deep vein thrombosis. Some other health problems may also put you at risk. Warfarin slows the amount of time it takes for your blood to clot. It can cause bleeding problems. Even if you've been taking warfarin for a while, it's important to know how to take it safely. Foods and other medicines can affect the way warfarin works. Some can make warfarin work too well. This can cause bleeding problems. And some can make it work poorly, so that it does not prevent blood clots very well. You will need regular blood tests to check how long it takes for your blood to form a clot. This test is called a PT or prothrombin time test. The result of the test is called an INR level.  Depending on the test results, your doctor or anticoagulation clinic may adjust your dose of warfarin. Follow-up care is a key part of your treatment and safety. Be sure to make and go to all appointments, and call your doctor if you are having problems. It's also a good idea to know your test results and keep a list of the medicines you take. How can you care for yourself at home? Take warfarin safely  · Take your warfarin at the same time each day. · If you miss a dose of warfarin, don't take an extra dose to make up for it. Your doctor can tell you exactly what to do so you don't take too much or too little. · Wear medical alert jewelry that lets others know that you take warfarin. You can buy this at most drugstores. · Don't take warfarin if you are pregnant or planning to get pregnant. Talk to your doctor about how you can prevent getting pregnant while you are taking it. · Don't change your dose or stop taking warfarin unless your doctor tells you to. Effects of medicines and food on warfarin  · Don't start or stop taking any medicines, vitamins, or natural remedies unless you first talk to your doctor. Many medicines can affect how warfarin works. These include aspirin and other pain relievers, over-the-counter medicines, multivitamins, dietary supplements, and herbal products. · Tell all of your doctors and pharmacists that you take warfarin. Some prescription medicines can affect how warfarin works. · Keep the amount of vitamin K in your diet about the same from day to day. Do not suddenly eat a lot more or a lot less food that is rich in vitamin K than you usually do. Vitamin K affects how warfarin works and how your blood clots. Talk with your doctor before making big changes in your diet. Vitamin K is in many foods, such as:  ¨ Leafy greens, such as kale, cabbage, spinach, Swiss chard, and lettuce. ¨ Canola and soybean oils. ¨ Green vegetables, such as asparagus, broccoli, and [place] sprouts.   ¨ Vegetable drinks, green tea leaves, and some dietary supplement drinks. · Avoid cranberry juice and other cranberry products. They can increase the effects of warfarin. · Limit your use of alcohol. Avoid bleeding by preventing falls and injuries  · Wear slippers or shoes with nonskid soles. · Remove throw rugs and clutter. · Rearrange furniture and electrical cords to keep them out of walking paths. · Keep stairways, porches, and outside walkways well lit. Use night-lights in hallways and bathrooms. · Be extra careful when you work with sharp tools or knives. When should you call for help? Call 911 anytime you think you may need emergency care. For example, call if:  · You have a sudden, severe headache that is different from past headaches. Call your doctor now or seek immediate medical care if:  · You have any abnormal bleeding, such as:  ¨ Nosebleeds. ¨ Vaginal bleeding that is different (heavier, more frequent, at a different time of the month) than what you are used to. ¨ Bloody or black stools, or rectal bleeding. ¨ Bloody or pink urine. Watch closely for changes in your health, and be sure to contact your doctor if you have any problems. Where can you learn more? Go to http://www.gray.com/. Enter L441 in the search box to learn more about \"Taking Warfarin Safely: Care Instructions. \"  Current as of: January 27, 2016  Content Version: 11.1  © 9193-9365 [employer], [employer]. Care instructions adapted under license by [employer] (which disclaims liability or warranty for this information). If you have questions about a medical condition or this instruction, always ask your healthcare professional. [place] [address] any warranty or liability for your use of this information.

## 2022-06-17 NOTE — PROGRESS NOTES
Pharmacy Progress Note - Anticoagulation Management    S/O:  Mr. Jocelin Alexis  is a 70 y.o. male seen today for anticoagulation management for the diagnosis of Atrial Fibrillation. HPI: At last visit (5/12) INR was 2.7 and therapeutic. Patient denied changes to his medications and reported consistent intake of vitamin K foods. Patient will continue current regimen and recheck INR in 4 weeks. Interim History:    · Warfarin start date: Prior to 3/6/20 per chart review. · INR Goal:  2.0-3.0    · Current warfarin regimen: 7.5 mg Fri, Sat; 5 mg daily ROW  · Warfarin tablet strength:   5 mg   · Duration of therapy: Indefinite    Today's pertinent positives includes:  Medication change and Change in diet/appetite    Patient reports that he is taking APAP PRN. Updated patient's medication list.  Patient reports eating less vitamin K foods since his last visit to the clinic. Results for orders placed or performed in visit on 06/20/22   POC PROTHROMBIN W/INR   Result Value Ref Range    VALID INTERNAL CONTROL POC Yes     Prothrombin time (POC) 48.0 seconds    INR POC 4.0        · Adherence:   · Able to recall regimen? YES  · Miss/extra dose? NO  · Need refill? NO    Upcoming procedure(s):  YES    Patient reports an upcoming dental procedure.     Past Medical History:   Diagnosis Date    Atrial fibrillation ([facility] [address]     CAD (coronary artery disease)     Chronic systolic HF (heart failure) (H[facility])     Congestive heart failure ([address]     DM type 2 (diabetes mellitus, type 2) (HCC)     Gout     HTN (hypertension)     Hypercholesterolemia     ICD (implantable cardioverter-defibrillator) in place     Long term current use of anticoagulant therapy     ASA & Warfarin    Nonischemic cardiomyopathy ([facility] [address] 2/8/2019    S/P cardiac cath 2/8/2019 2/8/19 cardiac cath with nonobstructive disease    Stroke [facility])     tia IN 2019    Syncope      No Known Allergies  Current Outpatient Medications   Medication Sig    warfarin (COUMADIN) 5 mg tablet Take 1.5 tablets (7.5 mg) by mouth every Friday and Saturday. Take 1 tablet (5 mg) every Sunday, Monday, Tuesday, Wednesday and Thursday. Or take as directed by the clinic.  sacubitriL-valsartan (Entresto) 49-51 mg tab tablet Take 1 Tablet by mouth two (2) times a day.  metFORMIN ER (GLUCOPHAGE XR) 500 mg tablet Take  by mouth. 3 tabs at night    atorvastatin (LIPITOR) 10 mg tablet TAKE 1 TABLET EVERY DAY    glimepiride (AMARYL) 2 mg tablet Take 2 mg by mouth every morning.  ACCU-CHEK DAVE PLUS METER [facility] USE TO TEST BLOOD SUGAR    multivitamin (ONE A DAY) tablet Take 1 Tab by mouth daily.  aspirin delayed-release 81 mg tablet Take 81 mg by mouth daily.  carvedilol (COREG) 25 mg tablet Take 25 mg by mouth two (2) times daily (with meals).  potassium chloride (KLOR-CON M10) 10 mEq tablet Take 10 mEq by mouth two (2) times a day.  furosemide (LASIX) 20 mg tablet Take 20 mg by mouth daily.  amLODIPine (NORVASC) 5 mg tablet Take 5 mg by mouth daily. No current facility-administered medications for this visit.      Wt Readings from Last 3 Encounters:   05/12/22 218 lb (98.9 kg)   04/12/22 218 lb 9.6 oz (99.2 kg)   04/05/22 217 lb (98.4 kg)     BP Readings from Last 3 Encounters:   05/12/22 113/84   04/12/22 120/70   04/05/22 101/66     Pulse Readings from Last 3 Encounters:   05/12/22 69   04/12/22 70   04/05/22 75     Lab Results   Component Value Date/Time    WBC 4.7 04/05/2022 11:07 AM    HGB 16.7 04/05/2022 11:07 AM    HCT 49.0 04/05/2022 11:07 AM    PLATELET 479 (L) 91/49/3632 11:07 AM    MCV 84.5 04/05/2022 11:07 AM     Lab Results   Component Value Date/Time    Sodium 141 04/05/2022 11:07 AM    Potassium 3.8 04/05/2022 11:07 AM    Chloride 111 (H) 04/05/2022 11:07 AM    CO2 23 04/05/2022 11:07 AM    Anion gap 7 04/05/2022 11:07 AM    Glucose 168 (H) 04/05/2022 11:07 AM    BUN 9 04/05/2022 11:07 AM    Creatinine 1.06 04/05/2022 11:07 AM BUN/Creatinine ratio 8 (L) 04/05/2022 11:07 AM    GFR est AA >60 04/05/2022 11:07 AM    GFR est non-AA >60 04/05/2022 11:07 AM    Calcium 8.9 04/05/2022 11:07 AM    Bilirubin, total 0.4 01/19/2022 03:35 PM    Alk. phosphatase 94 01/19/2022 03:35 PM    Protein, total 7.1 01/19/2022 03:35 PM    Albumin 3.5 01/19/2022 03:35 PM    Globulin 3.6 01/19/2022 03:35 PM    A-G Ratio 1.0 (L) 01/19/2022 03:35 PM    ALT (SGPT) 32 01/19/2022 03:35 PM     CrCl cannot be calculated (Unknown ideal weight.). INR History:   (normal INR range 0.8-1.2)     Date   INR   PT   Dose/Comments  06/20/22 4.0  48.0 7.5 mg Fri, Sat; 5 mg daily ROW  05/12/22 2.7  32.7 7.5 mg Fri, Sat; 5 mg daily ROW  04/15/22 2.3  27.3 7.5 mg Fri, Sat; 5 mg daily ROW    · Medications that can increase  INR: multivitamin, glimepiride  · Medications that can decrease INR: metformin    A/P:       Anticoagulation:  Considering Mr. Netty Nageotte past history, todays findings, and per Anticoagulation Collaborative Practice Agreement/Protocol:    1. Fingerstick POC INR (4.0) is Supratherapeutic for INR goal today. 2.  Change warfarin to hold dose today (6/2) and then resume 7.5 mg Fri, Sat; 5 mg daily ROW  3. INR is 4.0 and supratherapeutic. Patient denies extra doses of warfarin. Patient reports that he started taking APAP PRN. Patient reports eating less vitamin K foods since his last visit to the clinic. Patient states that he plan to resume his previous weekly intake with vitamin K foods. Patient will hold dose of warfarin today (6/20) and then resume taking 7.5 mg Fri, Sat; 5 mg daily ROW since patient has been previously therapeutic on current regimen. Encouraged patient to eat an additional serving of vitamin K foods. Patient to recheck INR in 2 weeks. Patient was instructed to schedule an appointment in 2 week(s) prior to leaving the clinic. Medication reconciliation was completed during the visit. There are no discontinued medications.     A full discussion of the nature of anticoagulants has been carried out. A full discussion of the need for frequent and regular monitoring, precise dosage adjustment and compliance was stressed. Side effects of potential bleeding were discussed and Mr. Rivero was instructed to call [phone] if there are any signs of abnormal bleeding. Mr. Luz Robles was instructed to avoid any OTC items containing aspirin or ibuprofen and prior to starting any new OTC products to consult with his physician or pharmacist to ensure no drug interactions are present. Mr. Luz Robles was instructed to avoid any major changes in his general diet and to avoid alcohol consumption. Mr. Luz Robles was provided information in the AVS that includes topics on understanding coumadin therapy, drug interaction considerations, vitamin K and coumadin use, interactions with foods and supplements containing vitamin K, and the use of herbal products. Mr. Luz Robles verbalized his understanding of all instructions and will call the office with any questions, concerns, or signs of abnormal bleeding or blood clot. Notifications of recommendations will be sent to Dr. Sandi Long for review.     Thank you,   Chikis Lechuga, [address] [facility] Tracking Only     Intervention Detail: Adherence Monitorin, Dose Adjustment: 1, reason: Therapy Optimization and Lab(s) Ordered   Total # of Interventions Recommended: 3   Total # of Interventions Accepted: 3   Time Spent (min): 20

## 2022-06-17 NOTE — PATIENT INSTRUCTIONS
Today your INR was 4.0 . Your goal INR is  2.0-3.0 . You have a 5 mg tablet of Coumadin (warfarin). Take Coumadin (warfarin) as follows:    Hold warfarin today (6/20/22) and then resume 7.5 mg (1.5 tablets) every Friday, Saturday; 5 mg daily rest of week. Come back in 2 week(s) for your next finger stick/INR blood test.        Avoid any over the counter items containing aspirin or ibuprofen, and avoid great swings in general diet. Avoid alcohol consumption. Please notify the INR nurse if you are started on any new medication including over the counter or herbal supplements. Also, please notify your INR nurse if any of your other prescription or over the counter medications have been discontinued. Call [facility] at [phone] if you have any signs of abnormal bleeding/blood clot.  ------------------------------------------------------------------------------------------------------------------  Taking Warfarin Safely: Care Instructions    Your Care Instructions  Warfarin is a medicine that you take to prevent blood clots. It is often called a blood thinner. Doctors give warfarin (such as Coumadin) to reduce the risk of blood clots. You may be at risk for blood clots if you have atrial fibrillation or deep vein thrombosis. Some other health problems may also put you at risk. Warfarin slows the amount of time it takes for your blood to clot. It can cause bleeding problems. Even if you've been taking warfarin for a while, it's important to know how to take it safely. Foods and other medicines can affect the way warfarin works. Some can make warfarin work too well. This can cause bleeding problems. And some can make it work poorly, so that it does not prevent blood clots very well. You will need regular blood tests to check how long it takes for your blood to form a clot. This test is called a PT or prothrombin time test. The result of the test is called an INR level.  Depending on the test results, your doctor or anticoagulation clinic may adjust your dose of warfarin. Follow-up care is a key part of your treatment and safety. Be sure to make and go to all appointments, and call your doctor if you are having problems. It's also a good idea to know your test results and keep a list of the medicines you take. How can you care for yourself at home? Take warfarin safely  · Take your warfarin at the same time each day. · If you miss a dose of warfarin, don't take an extra dose to make up for it. Your doctor can tell you exactly what to do so you don't take too much or too little. · Wear medical alert jewelry that lets others know that you take warfarin. You can buy this at most drugstores. · Don't take warfarin if you are pregnant or planning to get pregnant. Talk to your doctor about how you can prevent getting pregnant while you are taking it. · Don't change your dose or stop taking warfarin unless your doctor tells you to. Effects of medicines and food on warfarin  · Don't start or stop taking any medicines, vitamins, or natural remedies unless you first talk to your doctor. Many medicines can affect how warfarin works. These include aspirin and other pain relievers, over-the-counter medicines, multivitamins, dietary supplements, and herbal products. · Tell all of your doctors and pharmacists that you take warfarin. Some prescription medicines can affect how warfarin works. · Keep the amount of vitamin K in your diet about the same from day to day. Do not suddenly eat a lot more or a lot less food that is rich in vitamin K than you usually do. Vitamin K affects how warfarin works and how your blood clots. Talk with your doctor before making big changes in your diet. Vitamin K is in many foods, such as:  ¨ Leafy greens, such as kale, cabbage, spinach, Swiss chard, and lettuce. ¨ Canola and soybean oils.   ¨ Green vegetables, such as asparagus, broccoli, and [address] sprouts. ¨ Vegetable drinks, green tea leaves, and some dietary supplement drinks. · Avoid cranberry juice and other cranberry products. They can increase the effects of warfarin. · Limit your use of alcohol. Avoid bleeding by preventing falls and injuries  · Wear slippers or shoes with nonskid soles. · Remove throw rugs and clutter. · Rearrange furniture and electrical cords to keep them out of walking paths. · Keep stairways, porches, and outside walkways well lit. Use night-lights in hallways and bathrooms. · Be extra careful when you work with sharp tools or knives. When should you call for help? Call 911 anytime you think you may need emergency care. For example, call if:  · You have a sudden, severe headache that is different from past headaches. Call your doctor now or seek immediate medical care if:  · You have any abnormal bleeding, such as:  ¨ Nosebleeds. ¨ Vaginal bleeding that is different (heavier, more frequent, at a different time of the month) than what you are used to. ¨ Bloody or black stools, or rectal bleeding. ¨ Bloody or pink urine. Watch closely for changes in your health, and be sure to contact your doctor if you have any problems. Where can you learn more? Go to http://www.gray.com/. Enter E813 in the search box to learn more about \"Taking Warfarin Safely: Care Instructions. \"  Current as of: January 27, 2016  Content Version: 11.1  © 7721-3642 [employer]. Care instructions adapted under license by [employer] (which disclaims liability or warranty for this information). If you have questions about a medical condition or this instruction, always ask your healthcare professional. [place] [id number] any warranty or liability for your use of this information.

## 2022-06-20 ENCOUNTER — ANTI-COAG VISIT (OUTPATIENT)
Dept: PHARMACY | Age: 72
End: 2022-06-20
Payer: MEDICARE

## 2022-06-20 DIAGNOSIS — I48.0 PAF (PAROXYSMAL ATRIAL FIBRILLATION) (HCC): Primary | ICD-10-CM

## 2022-06-20 DIAGNOSIS — Z79.01 LONG TERM (CURRENT) USE OF ANTICOAGULANTS: ICD-10-CM

## 2022-06-20 LAB
INR BLD: 4
PT POC: 48 SECONDS
VALID INTERNAL CONTROL?: YES

## 2022-06-20 PROCEDURE — 85610 PROTHROMBIN TIME: CPT

## 2022-06-20 PROCEDURE — 99212 OFFICE O/P EST SF 10 MIN: CPT

## 2022-06-20 RX ORDER — ACETAMINOPHEN 500 MG
1000 TABLET ORAL
COMMUNITY

## 2022-07-05 ENCOUNTER — ANTI-COAG VISIT (OUTPATIENT)
Dept: PHARMACY | Age: 72
End: 2022-07-05
Payer: MEDICARE

## 2022-07-05 DIAGNOSIS — I48.0 PAF (PAROXYSMAL ATRIAL FIBRILLATION) (HCC): Primary | ICD-10-CM

## 2022-07-05 DIAGNOSIS — Z79.01 LONG TERM (CURRENT) USE OF ANTICOAGULANTS: ICD-10-CM

## 2022-07-05 LAB
INR BLD: 3
PT POC: 35.5 SECONDS
VALID INTERNAL CONTROL?: YES

## 2022-07-05 PROCEDURE — 85610 PROTHROMBIN TIME: CPT

## 2022-07-05 PROCEDURE — 99211 OFF/OP EST MAY X REQ PHY/QHP: CPT

## 2022-07-05 NOTE — PROGRESS NOTES
Pharmacy Progress Note - Anticoagulation Management    S/O:  Mr. Willis Zuleta.  is a 70 y.o. male seen today for anticoagulation management for the diagnosis of Atrial Fibrillation. HPI: At last visit (6/20), INR was 4.0 and supratherapeutic. Patient denied extra doses of warfarin. Patient reported that he started taking APAP PRN. Patient reported eating less vitamin K foods since his last visit to the clinic. Patient stated that he planned to resume his previous weekly intake with vitamin K foods. Patient will hold dose of warfarin today (6/20) and then resume taking 7.5 mg Fri, Sat; 5 mg daily ROW since patient has been previously therapeutic on current regimen. Encouraged patient to eat an additional serving of vitamin K foods. Patient to recheck INR in 2 weeks. Interim History:    · Warfarin start date: Prior to 3/6/20 per chart review. · INR Goal:  2.0-3.0    · Current warfarin regimen: hold dose today (6/20) and then resume 7.5 mg Fri, Sat; 5 mg daily ROW  · Warfarin tablet strength:   5 mg   · Duration of therapy: Indefinite    Today's pertinent positives includes:  No significant changes since last visit      Results for orders placed or performed in visit on 07/05/22   POC PROTHROMBIN W/INR   Result Value Ref Range    VALID INTERNAL CONTROL POC Yes     Prothrombin time (POC) 35.5 seconds    INR POC 3.0        · Adherence:   · Able to recall regimen? YES  · Miss/extra dose? NO  · Need refill? NO    Upcoming procedure(s):  YES    Patient reports an upcoming dental procedure. Consult scheduled for 7/20. Patient reports cardiologist requesting for warfarin to be held for 3 days prior to dental procedure.     Past Medical History:   Diagnosis Date    [facility] [address]     CAD (coronary artery disease)     Chronic systolic HF (heart failure) (HCC)     Congestive heart failure ([facility] [address]     DM type 2 (diabetes mellitus, type 2) (HCC)     Gout     HTN (hypertension)     Hypercholesterolemia  ICD (implantable cardioverter-defibrillator) in place     Long term current use of anticoagulant therapy     ASA & Warfarin    Nonischemic cardiomyopathy ([facility] [address] 2/8/2019    S/P cardiac cath 2/8/2019 2/8/19 cardiac cath with nonobstructive disease    Stroke [facility])     tia IN 2019    Syncope      No Known Allergies  Current Outpatient Medications   Medication Sig    acetaminophen (TYLENOL) 500 mg tablet Take 1,000 mg by mouth daily as needed for Pain.  warfarin (COUMADIN) 5 mg tablet Take 1.5 tablets (7.5 mg) by mouth every Friday and Saturday. Take 1 tablet (5 mg) every Sunday, Monday, Tuesday, Wednesday and Thursday. Or take as directed by the clinic.  sacubitriL-valsartan (Entresto) 49-51 mg tab tablet Take 1 Tablet by mouth two (2) times a day.  metFORMIN ER (GLUCOPHAGE XR) 500 mg tablet Take  by mouth. 3 tabs at night    atorvastatin (LIPITOR) 10 mg tablet TAKE 1 TABLET EVERY DAY    glimepiride (AMARYL) 2 mg tablet Take 2 mg by mouth every morning.  ACCU-CHEK DAVE PLUS METER mis USE TO TEST BLOOD SUGAR    multivitamin (ONE A DAY) tablet Take 1 Tab by mouth daily.  aspirin delayed-release 81 mg tablet Take 81 mg by mouth daily.  carvedilol (COREG) 25 mg tablet Take 25 mg by mouth two (2) times daily (with meals).  potassium chloride (KLOR-CON M10) 10 mEq tablet Take 10 mEq by mouth two (2) times a day.  furosemide (LASIX) 20 mg tablet Take 20 mg by mouth daily.  amLODIPine (NORVASC) 5 mg tablet Take 5 mg by mouth daily. No current facility-administered medications for this visit.      Wt Readings from Last 3 Encounters:   05/12/22 218 lb (98.9 kg)   04/12/22 218 lb 9.6 oz (99.2 kg)   04/05/22 217 lb (98.4 kg)     BP Readings from Last 3 Encounters:   05/12/22 113/84   04/12/22 120/70   04/05/22 101/66     Pulse Readings from Last 3 Encounters:   05/12/22 69   04/12/22 70   04/05/22 75     Lab Results   Component Value Date/Time    WBC 4.7 04/05/2022 11:07 AM    HGB 16.7 04/05/2022 11:07 AM    HCT 49.0 04/05/2022 11:07 AM    PLATELET 731 (L) 32/39/4329 11:07 AM    MCV 84.5 04/05/2022 11:07 AM     Lab Results   Component Value Date/Time    Sodium 141 04/05/2022 11:07 AM    Potassium 3.8 04/05/2022 11:07 AM    Chloride 111 (H) 04/05/2022 11:07 AM    CO2 23 04/05/2022 11:07 AM    Anion gap 7 04/05/2022 11:07 AM    Glucose 168 (H) 04/05/2022 11:07 AM    BUN 9 04/05/2022 11:07 AM    Creatinine 1.06 04/05/2022 11:07 AM    BUN/Creatinine ratio 8 (L) 04/05/2022 11:07 AM    GFR est AA >60 04/05/2022 11:07 AM    GFR est non-AA >60 04/05/2022 11:07 AM    Calcium 8.9 04/05/2022 11:07 AM    Bilirubin, total 0.4 01/19/2022 03:35 PM    Alk. phosphatase 94 01/19/2022 03:35 PM    Protein, total 7.1 01/19/2022 03:35 PM    Albumin 3.5 01/19/2022 03:35 PM    Globulin 3.6 01/19/2022 03:35 PM    A-G Ratio 1.0 (L) 01/19/2022 03:35 PM    ALT (SGPT) 32 01/19/2022 03:35 PM     CrCl cannot be calculated (Unknown ideal weight.). INR History:   (normal INR range 0.8-1.2)     Date   INR   PT   Dose/Comments  07/05/22 3.0  35.5 hold dose 6/20 then resume 7.5 mg Fri, Sat; 5 mg daily ROW  06/20/22 4.0  48.0 7.5 mg Fri, Sat; 5 mg daily ROW  05/12/22 2.7  32.7 7.5 mg Fri, Sat; 5 mg daily ROW  04/15/22 2.3  27.3 7.5 mg Fri, Sat; 5 mg daily ROW    · Medications that can increase  INR: multivitamin, glimepiride  · Medications that can decrease INR: metformin    A/P:       Anticoagulation:  Considering Mr. Tracie Crow past history, todays findings, and per Anticoagulation Collaborative Practice Agreement/Protocol:    1. Fingerstick POC INR (3.0) is Therapeutic for INR goal today. 2.  Continue warfarin 7.5 mg Fri, Sat; 5 mg daily ROW  3. INR is 3.0 and therapeutic. Patient denies changes to his medications and reports consistent intake of vitamin K foods. Patient will continue current regimen and recheck INR in 2 weeks.        Patient was instructed to schedule an appointment in 2 week(s) prior to leaving the clinic. Medication reconciliation was completed during the visit. There are no discontinued medications. A full discussion of the nature of anticoagulants has been carried out. A full discussion of the need for frequent and regular monitoring, precise dosage adjustment and compliance was stressed. Side effects of potential bleeding were discussed and Mr. Rivero was instructed to call [phone] if there are any signs of abnormal bleeding. Mr. Alpesh Phillip was instructed to avoid any OTC items containing aspirin or ibuprofen and prior to starting any new OTC products to consult with his physician or pharmacist to ensure no drug interactions are present. Mr. Alpesh Phillip was instructed to avoid any major changes in his general diet and to avoid alcohol consumption. Mr. Alpesh Phillip was provided information in the AVS that includes topics on understanding coumadin therapy, drug interaction considerations, vitamin K and coumadin use, interactions with foods and supplements containing vitamin K, and the use of herbal products. Mr. Alpesh Phillip verbalized his understanding of all instructions and will call the office with any questions, concerns, or signs of abnormal bleeding or blood clot. Notifications of recommendations will be sent to Dr. Pita Yadav for review.     Thank you,   Kym Cook, [address] Tracking Only     Intervention Detail: Adherence Monitorin and Lab(s) Ordered   Total # of Interventions Recommended: 2   Total # of Interventions Accepted: 2   Time Spent (min): 20

## 2022-07-05 NOTE — PATIENT INSTRUCTIONS
Today your INR was 3.0 . Your goal INR is  2.0-3.0 . You have a 5 mg tablet of Coumadin (warfarin). Take Coumadin (warfarin) as follows: Take 7.5 mg (1.5 tablets) every Friday, Saturday; 5 mg daily rest of week. Come back in 2 week(s) for your next finger stick/INR blood test.        Avoid any over the counter items containing aspirin or ibuprofen, and avoid great swings in general diet. Avoid alcohol consumption. Please notify the INR nurse if you are started on any new medication including over the counter or herbal supplements. Also, please notify your INR nurse if any of your other prescription or over the counter medications have been discontinued. Call [facility] at [phone] if you have any signs of abnormal bleeding/blood clot.  ------------------------------------------------------------------------------------------------------------------  Taking Warfarin Safely: Care Instructions    Your Care Instructions  Warfarin is a medicine that you take to prevent blood clots. It is often called a blood thinner. Doctors give warfarin (such as Coumadin) to reduce the risk of blood clots. You may be at risk for blood clots if you have atrial fibrillation or deep vein thrombosis. Some other health problems may also put you at risk. Warfarin slows the amount of time it takes for your blood to clot. It can cause bleeding problems. Even if you've been taking warfarin for a while, it's important to know how to take it safely. Foods and other medicines can affect the way warfarin works. Some can make warfarin work too well. This can cause bleeding problems. And some can make it work poorly, so that it does not prevent blood clots very well. You will need regular blood tests to check how long it takes for your blood to form a clot. This test is called a PT or prothrombin time test. The result of the test is called an INR level.  Depending on the test results, your doctor or anticoagulation clinic may adjust your dose of warfarin. Follow-up care is a key part of your treatment and safety. Be sure to make and go to all appointments, and call your doctor if you are having problems. It's also a good idea to know your test results and keep a list of the medicines you take. How can you care for yourself at home? Take warfarin safely  · Take your warfarin at the same time each day. · If you miss a dose of warfarin, don't take an extra dose to make up for it. Your doctor can tell you exactly what to do so you don't take too much or too little. · Wear medical alert jewelry that lets others know that you take warfarin. You can buy this at most drugstores. · Don't take warfarin if you are pregnant or planning to get pregnant. Talk to your doctor about how you can prevent getting pregnant while you are taking it. · Don't change your dose or stop taking warfarin unless your doctor tells you to. Effects of medicines and food on warfarin  · Don't start or stop taking any medicines, vitamins, or natural remedies unless you first talk to your doctor. Many medicines can affect how warfarin works. These include aspirin and other pain relievers, over-the-counter medicines, multivitamins, dietary supplements, and herbal products. · Tell all of your doctors and pharmacists that you take warfarin. Some prescription medicines can affect how warfarin works. · Keep the amount of vitamin K in your diet about the same from day to day. Do not suddenly eat a lot more or a lot less food that is rich in vitamin K than you usually do. Vitamin K affects how warfarin works and how your blood clots. Talk with your doctor before making big changes in your diet. Vitamin K is in many foods, such as:  ¨ Leafy greens, such as kale, cabbage, spinach, Swiss chard, and lettuce. ¨ Canola and soybean oils. ¨ Green vegetables, such as asparagus, broccoli, and [place] sprouts.   ¨ Vegetable drinks, green tea leaves, and some dietary supplement drinks. · Avoid cranberry juice and other cranberry products. They can increase the effects of warfarin. · Limit your use of alcohol. Avoid bleeding by preventing falls and injuries  · Wear slippers or shoes with nonskid soles. · Remove throw rugs and clutter. · Rearrange furniture and electrical cords to keep them out of walking paths. · Keep stairways, porches, and outside walkways well lit. Use night-lights in hallways and bathrooms. · Be extra careful when you work with sharp tools or knives. When should you call for help? Call 911 anytime you think you may need emergency care. For example, call if:  · You have a sudden, severe headache that is different from past headaches. Call your doctor now or seek immediate medical care if:  · You have any abnormal bleeding, such as:  ¨ Nosebleeds. ¨ Vaginal bleeding that is different (heavier, more frequent, at a different time of the month) than what you are used to. ¨ Bloody or black stools, or rectal bleeding. ¨ Bloody or pink urine. Watch closely for changes in your health, and be sure to contact your doctor if you have any problems. Where can you learn more? Go to http://www.gray.com/. Enter Y184 in the search box to learn more about \"Taking Warfarin Safely: Care Instructions. \"  Current as of: January 27, 2016  Content Version: 11.1  © 0960-8713 [employer]. Care instructions adapted under license by [employer] (which disclaims liability or warranty for this information). If you have questions about a medical condition or this instruction, always ask your healthcare professional. [place] [address] any warranty or liability for your use of this information.

## 2022-07-25 ENCOUNTER — ANTI-COAG VISIT (OUTPATIENT)
Dept: PHARMACY | Age: 72
End: 2022-07-25
Payer: MEDICARE

## 2022-07-25 DIAGNOSIS — Z79.01 LONG TERM (CURRENT) USE OF ANTICOAGULANTS: ICD-10-CM

## 2022-07-25 DIAGNOSIS — I48.0 PAF (PAROXYSMAL ATRIAL FIBRILLATION) (HCC): Primary | ICD-10-CM

## 2022-07-25 LAB
INR BLD: 3.4
PT POC: 41.2 SECONDS
VALID INTERNAL CONTROL?: YES

## 2022-07-25 PROCEDURE — 85610 PROTHROMBIN TIME: CPT

## 2022-07-25 PROCEDURE — 99211 OFF/OP EST MAY X REQ PHY/QHP: CPT

## 2022-07-25 NOTE — PROGRESS NOTES
Pharmacy Progress Note - Anticoagulation Management    S/O:  Mr. Claudia Bhatti  is a 70 y.o. male seen today for anticoagulation management for the diagnosis of Atrial Fibrillation. HPI: At last visit (7/5), INR was 3.0 and therapeutic. Patient denied changes to his medications and reported consistent intake of vitamin K foods. Patient will continue current regimen and recheck INR in 2 weeks. Interim History:    Warfarin start date: Prior to 3/6/20 per chart review. INR Goal:  2.0-3.0    Current warfarin regimen: 7.5 mg Fri, Sat; 5 mg daily ROW  Warfarin tablet strength:   5 mg   Duration of therapy: Indefinite    Today's pertinent positives includes:  Change in diet/appetite    Patient reports only eating one serving of a food with vitamin K, whereas he usually has two servings. Results for orders placed or performed in visit on 07/25/22   POC PROTHROMBIN W/INR   Result Value Ref Range    VALID INTERNAL CONTROL POC Yes     Prothrombin time (POC) 41.2 seconds    INR POC 3.4        Adherence:   Able to recall regimen? YES  Miss/extra dose? YES  Need refill? NO    Patient reports two teeth were extracted on 7/13 and warfarin was held 3 days prior to the procedure. Upcoming procedure(s):  YES    Patient reports an upcoming dental procedure. Patient reports waiting for cardiologist to clear him for surgery and it may require warfarin to be held for 3 days prior to the dental procedure.     Past Medical History:   Diagnosis Date    Atrial fibrillation (HCC)     CAD (coronary artery disease)     Chronic systolic HF (heart failure) (HCC)     Congestive heart failure (HCC)     DM type 2 (diabetes mellitus, type 2) (HCC)     Gout     HTN (hypertension)     Hypercholesterolemia     ICD (implantable cardioverter-defibrillator) in place     Long term current use of anticoagulant therapy     ASA & Warfarin    Nonischemic cardiomyopathy ([facility] [address] 2/8/2019    S/P cardiac cath 2/8/2019 2/8/19 cardiac cath with nonobstructive disease    Stroke ([facility] [address]     tia IN 2019    Syncope      No Known Allergies  Current Outpatient Medications   Medication Sig    acetaminophen (TYLENOL) 500 mg tablet Take 1,000 mg by mouth daily as needed for Pain.    warfarin (COUMADIN) 5 mg tablet Take 1.5 tablets (7.5 mg) by mouth every Friday and Saturday. Take 1 tablet (5 mg) every Sunday, Monday, Tuesday, Wednesday and Thursday. Or take as directed by the clinic.    sacubitriL-valsartan (Entresto) 49-51 mg tab tablet Take 1 Tablet by mouth two (2) times a day. metFORMIN ER (GLUCOPHAGE XR) 500 mg tablet Take  by mouth. 3 tabs at night    atorvastatin (LIPITOR) 10 mg tablet TAKE 1 TABLET EVERY DAY    glimepiride (AMARYL) 2 mg tablet Take 2 mg by mouth every morning. ACCU-CHEK DAVE PLUS METER misc USE TO TEST BLOOD SUGAR    multivitamin (ONE A DAY) tablet Take 1 Tab by mouth daily. aspirin delayed-release 81 mg tablet Take 81 mg by mouth daily. carvedilol (COREG) 25 mg tablet Take 25 mg by mouth two (2) times daily (with meals). potassium chloride (KLOR-CON M10) 10 mEq tablet Take 10 mEq by mouth two (2) times a day. furosemide (LASIX) 20 mg tablet Take 20 mg by mouth daily. amLODIPine (NORVASC) 5 mg tablet Take 5 mg by mouth daily. No current facility-administered medications for this visit.      Wt Readings from Last 3 Encounters:   05/12/22 218 lb (98.9 kg)   04/12/22 218 lb 9.6 oz (99.2 kg)   04/05/22 217 lb (98.4 kg)     BP Readings from Last 3 Encounters:   05/12/22 113/84   04/12/22 120/70   04/05/22 101/66     Pulse Readings from Last 3 Encounters:   05/12/22 69   04/12/22 70   04/05/22 75     Lab Results   Component Value Date/Time    WBC 4.7 04/05/2022 11:07 AM    HGB 16.7 04/05/2022 11:07 AM    HCT 49.0 04/05/2022 11:07 AM    PLATELET 612 (L) 76/45/9710 11:07 AM    MCV 84.5 04/05/2022 11:07 AM     Lab Results   Component Value Date/Time    Sodium 141 04/05/2022 11:07 AM    Potassium 3.8 04/05/2022 11:07 AM Chloride 111 (H) 04/05/2022 11:07 AM    CO2 23 04/05/2022 11:07 AM    Anion gap 7 04/05/2022 11:07 AM    Glucose 168 (H) 04/05/2022 11:07 AM    BUN 9 04/05/2022 11:07 AM    Creatinine 1.06 04/05/2022 11:07 AM    BUN/Creatinine ratio 8 (L) 04/05/2022 11:07 AM    GFR est AA >60 04/05/2022 11:07 AM    GFR est non-AA >60 04/05/2022 11:07 AM    Calcium 8.9 04/05/2022 11:07 AM    Bilirubin, total 0.4 01/19/2022 03:35 PM    Alk. phosphatase 94 01/19/2022 03:35 PM    Protein, total 7.1 01/19/2022 03:35 PM    Albumin 3.5 01/19/2022 03:35 PM    Globulin 3.6 01/19/2022 03:35 PM    A-G Ratio 1.0 (L) 01/19/2022 03:35 PM    ALT (SGPT) 32 01/19/2022 03:35 PM     CrCl cannot be calculated (Unknown ideal weight.). INR History:   (normal INR range 0.8-1.2)     Date   INR   PT   Dose/Comments  07/25/22 3.4  41.2 7.5 mg Fri, Sat; 5 mg daily ROW  07/05/22 3.0  35.5 hold dose 6/20 then resume 7.5 mg Fri, Sat; 5 mg daily ROW  06/20/22 4.0  48.0 7.5 mg Fri, Sat; 5 mg daily ROW  05/12/22 2.7  32.7 7.5 mg Fri, Sat; 5 mg daily ROW  04/15/22 2.3  27.3 7.5 mg Fri, Sat; 5 mg daily ROW    Medications that can increase  INR: multivitamin, glimepiride  Medications that can decrease INR: metformin    A/P:       Anticoagulation:  Considering Mr. El Dennison past history, todays findings, and per Anticoagulation Collaborative Practice Agreement/Protocol:    Fingerstick POC INR (3.4) is Supratherapeutic for INR goal today. Continue warfarin 7.5 mg Fri, Sat; 5 mg daily ROW  INR is 3.4 and supratherapeutic. Patient denies extra doses of warfarin or changes to his medications. Patient reports only eating one serving of a food with vitamin K, whereas he usually has two servings. Patient reports two teeth were extracted on 7/13 and warfarin was held 3 days prior to the procedure. Warfarin was restarted on 7/13. Patient has been previously therapeutic on current regimen and patient will continue 7.5 mg Fri, Sat; 5 mg daily ROW.  Patient reports that he will resume his usual diet with vitamin K foods. Patient to recheck INR in 2 weeks. Patient was instructed to schedule an appointment in 2 week(s) prior to leaving the clinic. Medication reconciliation was completed during the visit. There are no discontinued medications. A full discussion of the nature of anticoagulants has been carried out. A full discussion of the need for frequent and regular monitoring, precise dosage adjustment and compliance was stressed. Side effects of potential bleeding were discussed and Mr. Rivero was instructed to call [phone] if there are any signs of abnormal bleeding. Mr. Sis Moran was instructed to avoid any OTC items containing aspirin or ibuprofen and prior to starting any new OTC products to consult with his physician or pharmacist to ensure no drug interactions are present. Mr. Sis Moran was instructed to avoid any major changes in his general diet and to avoid alcohol consumption. Mr. Sis Moran was provided information in the AVS that includes topics on understanding coumadin therapy, drug interaction considerations, vitamin K and coumadin use, interactions with foods and supplements containing vitamin K, and the use of herbal products. Mr. Sis Moran verbalized his understanding of all instructions and will call the office with any questions, concerns, or signs of abnormal bleeding or blood clot. Notifications of recommendations will be sent to Dr. Rakesh Quevedo for review.     Thank you,   Georgina Thurston, [address] [facility] Tracking Only    Intervention Detail: Adherence Monitorin and Lab(s) Ordered  Total # of Interventions Recommended: 2  Total # of Interventions Accepted: 2  Time Spent (min): 20

## 2022-07-25 NOTE — PATIENT INSTRUCTIONS
Today your INR was 3.4 . Your goal INR is  2.0-3.0 . You have a 5 mg tablet of Coumadin (warfarin). Take Coumadin (warfarin) as follows: Take 7.5 mg (1.5 tablets) every Friday, Saturday; 5 mg daily rest of week. Come back in 2 week(s) for your next finger stick/INR blood test.        Avoid any over the counter items containing aspirin or ibuprofen, and avoid great swings in general diet. Avoid alcohol consumption. Please notify the INR nurse if you are started on any new medication including over the counter or herbal supplements. Also, please notify your INR nurse if any of your other prescription or over the counter medications have been discontinued. Call [facility] at [phone] if you have any signs of abnormal bleeding/blood clot.  ------------------------------------------------------------------------------------------------------------------  Taking Warfarin Safely: Care Instructions    Your Care Instructions  Warfarin is a medicine that you take to prevent blood clots. It is often called a blood thinner. Doctors give warfarin (such as Coumadin) to reduce the risk of blood clots. You may be at risk for blood clots if you have atrial fibrillation or deep vein thrombosis. Some other health problems may also put you at risk. Warfarin slows the amount of time it takes for your blood to clot. It can cause bleeding problems. Even if you've been taking warfarin for a while, it's important to know how to take it safely. Foods and other medicines can affect the way warfarin works. Some can make warfarin work too well. This can cause bleeding problems. And some can make it work poorly, so that it does not prevent blood clots very well. You will need regular blood tests to check how long it takes for your blood to form a clot. This test is called a PT or prothrombin time test. The result of the test is called an INR level.  Depending on the test results, your doctor or anticoagulation clinic may adjust your dose of warfarin. Follow-up care is a key part of your treatment and safety. Be sure to make and go to all appointments, and call your doctor if you are having problems. It's also a good idea to know your test results and keep a list of the medicines you take. How can you care for yourself at home? Take warfarin safely  Take your warfarin at the same time each day. If you miss a dose of warfarin, don't take an extra dose to make up for it. Your doctor can tell you exactly what to do so you don't take too much or too little. Wear medical alert jewelry that lets others know that you take warfarin. You can buy this at most drugstores. Don't take warfarin if you are pregnant or planning to get pregnant. Talk to your doctor about how you can prevent getting pregnant while you are taking it. Don't change your dose or stop taking warfarin unless your doctor tells you to. Effects of medicines and food on warfarin  Don't start or stop taking any medicines, vitamins, or natural remedies unless you first talk to your doctor. Many medicines can affect how warfarin works. These include aspirin and other pain relievers, over-the-counter medicines, multivitamins, dietary supplements, and herbal products. Tell all of your doctors and pharmacists that you take warfarin. Some prescription medicines can affect how warfarin works. Keep the amount of vitamin K in your diet about the same from day to day. Do not suddenly eat a lot more or a lot less food that is rich in vitamin K than you usually do. Vitamin K affects how warfarin works and how your blood clots. Talk with your doctor before making big changes in your diet. Vitamin K is in many foods, such as:  Leafy greens, such as kale, cabbage, spinach, Swiss chard, and lettuce. Canola and soybean oils. Green vegetables, such as asparagus, broccoli, and [place] sprouts.   Vegetable drinks, green tea leaves, and some dietary supplement drinks. Avoid cranberry juice and other cranberry products. They can increase the effects of warfarin. Limit your use of alcohol. Avoid bleeding by preventing falls and injuries  Wear slippers or shoes with nonskid soles. Remove throw rugs and clutter. Rearrange furniture and electrical cords to keep them out of walking paths. Keep stairways, porches, and outside walkways well lit. Use night-lights in hallways and bathrooms. Be extra careful when you work with sharp tools or knives. When should you call for help? Call 911 anytime you think you may need emergency care. For example, call if:  You have a sudden, severe headache that is different from past headaches. Call your doctor now or seek immediate medical care if:  You have any abnormal bleeding, such as:  Nosebleeds. Vaginal bleeding that is different (heavier, more frequent, at a different time of the month) than what you are used to. Bloody or black stools, or rectal bleeding. Bloody or pink urine. Watch closely for changes in your health, and be sure to contact your doctor if you have any problems. Where can you learn more? Go to http://www.gray.com/. Enter L869 in the search box to learn more about \"Taking Warfarin Safely: Care Instructions. \"  Current as of: January 27, 2016  Content Version: 11.1  © 0297-9640 [employer]. Care instructions adapted under license by [employer] (which disclaims liability or warranty for this information). If you have questions about a medical condition or this instruction, always ask your healthcare professional. [place] [address] any warranty or liability for your use of this information.

## 2022-08-15 NOTE — PATIENT INSTRUCTIONS
Today your INR was 4.2 . Your goal INR is  2.0-3.0 . You have a 5 mg tablet of Coumadin (warfarin). Take Coumadin (warfarin) as follows:    Hold doses today (8/16/22) and tomorrow (8/17/22) and then take 7.5 mg (1.5 tablets) every Saturday; and 5 mg daily rest of the week. Come back in 1 week(s) for your next finger stick/INR blood test.        Avoid any over the counter items containing aspirin or ibuprofen, and avoid great swings in general diet. Avoid alcohol consumption. Please notify the INR nurse if you are started on any new medication including over the counter or herbal supplements. Also, please notify your INR nurse if any of your other prescription or over the counter medications have been discontinued. Call [facility] at [phone] if you have any signs of abnormal bleeding/blood clot.  ------------------------------------------------------------------------------------------------------------------  Taking Warfarin Safely: Care Instructions    Your Care Instructions  Warfarin is a medicine that you take to prevent blood clots. It is often called a blood thinner. Doctors give warfarin (such as Coumadin) to reduce the risk of blood clots. You may be at risk for blood clots if you have atrial fibrillation or deep vein thrombosis. Some other health problems may also put you at risk. Warfarin slows the amount of time it takes for your blood to clot. It can cause bleeding problems. Even if you've been taking warfarin for a while, it's important to know how to take it safely. Foods and other medicines can affect the way warfarin works. Some can make warfarin work too well. This can cause bleeding problems. And some can make it work poorly, so that it does not prevent blood clots very well. You will need regular blood tests to check how long it takes for your blood to form a clot.  This test is called a PT or prothrombin time test. The result of the test is called an INR level. Depending on the test results, your doctor or anticoagulation clinic may adjust your dose of warfarin. Follow-up care is a key part of your treatment and safety. Be sure to make and go to all appointments, and call your doctor if you are having problems. It's also a good idea to know your test results and keep a list of the medicines you take. How can you care for yourself at home? Take warfarin safely  Take your warfarin at the same time each day. If you miss a dose of warfarin, don't take an extra dose to make up for it. Your doctor can tell you exactly what to do so you don't take too much or too little. Wear medical alert jewelry that lets others know that you take warfarin. You can buy this at most drugstores. Don't take warfarin if you are pregnant or planning to get pregnant. Talk to your doctor about how you can prevent getting pregnant while you are taking it. Don't change your dose or stop taking warfarin unless your doctor tells you to. Effects of medicines and food on warfarin  Don't start or stop taking any medicines, vitamins, or natural remedies unless you first talk to your doctor. Many medicines can affect how warfarin works. These include aspirin and other pain relievers, over-the-counter medicines, multivitamins, dietary supplements, and herbal products. Tell all of your doctors and pharmacists that you take warfarin. Some prescription medicines can affect how warfarin works. Keep the amount of vitamin K in your diet about the same from day to day. Do not suddenly eat a lot more or a lot less food that is rich in vitamin K than you usually do. Vitamin K affects how warfarin works and how your blood clots. Talk with your doctor before making big changes in your diet. Vitamin K is in many foods, such as:  Leafy greens, such as kale, cabbage, spinach, Swiss chard, and lettuce. Canola and soybean oils.   Green vegetables, such as asparagus, broccoli, and [address] sprouts. Vegetable drinks, green tea leaves, and some dietary supplement drinks. Avoid cranberry juice and other cranberry products. They can increase the effects of warfarin. Limit your use of alcohol. Avoid bleeding by preventing falls and injuries  Wear slippers or shoes with nonskid soles. Remove throw rugs and clutter. Rearrange furniture and electrical cords to keep them out of walking paths. Keep stairways, porches, and outside walkways well lit. Use night-lights in hallways and bathrooms. Be extra careful when you work with sharp tools or knives. When should you call for help? Call 911 anytime you think you may need emergency care. For example, call if:  You have a sudden, severe headache that is different from past headaches. Call your doctor now or seek immediate medical care if:  You have any abnormal bleeding, such as:  Nosebleeds. Vaginal bleeding that is different (heavier, more frequent, at a different time of the month) than what you are used to. Bloody or black stools, or rectal bleeding. Bloody or pink urine. Watch closely for changes in your health, and be sure to contact your doctor if you have any problems. Where can you learn more? Go to http://www.gray.com/. Enter C778 in the search box to learn more about \"Taking Warfarin Safely: Care Instructions. \"  Current as of: January 27, 2016  Content Version: 11.1  © 5104-1166 [employer]. Care instructions adapted under license by [employer] (which disclaims liability or warranty for this information). If you have questions about a medical condition or this instruction, always ask your healthcare professional. [place] [address] any warranty or liability for your use of this information.

## 2022-08-15 NOTE — PROGRESS NOTES
Pharmacy Progress Note - Anticoagulation Management    S/O:  Mr. Chase Felix  is a 70 y.o. male seen today for anticoagulation management for the diagnosis of Atrial Fibrillation. HPI: At last visit (7/25), INR was 3.4 and supratherapeutic. Patient denied extra doses of warfarin or changes to his medications. Patient reported only eating one serving of a food with vitamin K, whereas he usually has two servings. Patient reported two teeth were extracted on 7/13 and warfarin was held 3 days prior to the procedure. Warfarin was restarted on 7/13. Patient has been previously therapeutic on current regimen and patient will continue 7.5 mg Fri, Sat; 5 mg daily ROW. Patient reported that he will resume his usual diet with vitamin K foods. Patient to recheck INR in 2 weeks. Via a telephone encounter (8/2), patient reported he has a dental procedure scheduled for Monday, August 8th. Patient was instructed by provider to hold warfarin for 5 days (8/3-8/7) prior the procedure. Patient will restart warfarin on 8/8 under the guidance of the dentist.     Interim History:    Warfarin start date: Prior to 3/6/20 per chart review. INR Goal:  2.0-3.0    Current warfarin regimen: 7.5 mg Fri, Sat; 5 mg daily ROW  Warfarin tablet strength:   5 mg   Duration of therapy: Indefinite    Today's pertinent positives includes:  Medication change    Patient reports no longer taking Amaryl and the addition of [place]. Updated patient's medication list.    Results for orders placed or performed in visit on 08/16/22   POC PROTHROMBIN W/INR   Result Value Ref Range    VALID INTERNAL CONTROL POC Yes     Prothrombin time (POC) 50.0 seconds    INR POC 4.2        Adherence:   Able to recall regimen? YES  Miss/extra dose? YES  Need refill? NO    Patient reports he had a dental procedure Monday, August 8th. Patient reports he was instructed by provider to hold warfarin for 5 days (8/3-8/7) prior the procedure.  Patient reports restarting warfarin on 8/8 under the guidance of the dentist.     Upcoming procedure(s):  YES    Patient reports an upcoming dental procedure. Patient reports waiting for cardiologist to clear him for surgery. Past Medical History:   Diagnosis Date    Atrial fibrillation (HCC)     CAD (coronary artery disease)     Chronic systolic HF (heart failure) (HCC)     Congestive heart failure (HCC)     DM type 2 (diabetes mellitus, type 2) (HCC)     Gout     HTN (hypertension)     Hypercholesterolemia     ICD (implantable cardioverter-defibrillator) in place     Long term current use of anticoagulant therapy     ASA & Warfarin    Nonischemic cardiomyopathy ([facility] [address] 2/8/2019    S/P cardiac cath 2/8/2019 2/8/19 cardiac cath with nonobstructive disease    Stroke [facility])     tia IN 2019    Syncope      No Known Allergies  Current Outpatient Medications   Medication Sig    acetaminophen (TYLENOL) 500 mg tablet Take 1,000 mg by mouth daily as needed for Pain.    warfarin (COUMADIN) 5 mg tablet Take 1.5 tablets (7.5 mg) by mouth every Friday and Saturday. Take 1 tablet (5 mg) every Sunday, Monday, Tuesday, Wednesday and Thursday. Or take as directed by the clinic.    sacubitriL-valsartan (Entresto) 49-51 mg tab tablet Take 1 Tablet by mouth two (2) times a day. metFORMIN ER (GLUCOPHAGE XR) 500 mg tablet Take  by mouth. 3 tabs at night    atorvastatin (LIPITOR) 10 mg tablet TAKE 1 TABLET EVERY DAY    glimepiride (AMARYL) 2 mg tablet Take 2 mg by mouth every morning. ACCU-CHEK DAVE PLUS METER [facility] USE TO TEST BLOOD SUGAR    multivitamin (ONE A DAY) tablet Take 1 Tab by mouth daily. aspirin delayed-release 81 mg tablet Take 81 mg by mouth daily. carvedilol (COREG) 25 mg tablet Take 25 mg by mouth two (2) times daily (with meals). potassium chloride (KLOR-CON M10) 10 mEq tablet Take 10 mEq by mouth two (2) times a day. furosemide (LASIX) 20 mg tablet Take 20 mg by mouth daily.     amLODIPine (NORVASC) 5 mg tablet Take 5 mg by mouth daily. No current facility-administered medications for this visit. Wt Readings from Last 3 Encounters:   05/12/22 218 lb (98.9 kg)   04/12/22 218 lb 9.6 oz (99.2 kg)   04/05/22 217 lb (98.4 kg)     BP Readings from Last 3 Encounters:   05/12/22 113/84   04/12/22 120/70   04/05/22 101/66     Pulse Readings from Last 3 Encounters:   05/12/22 69   04/12/22 70   04/05/22 75     Lab Results   Component Value Date/Time    WBC 4.7 04/05/2022 11:07 AM    HGB 16.7 04/05/2022 11:07 AM    HCT 49.0 04/05/2022 11:07 AM    PLATELET 867 (L) 86/88/9318 11:07 AM    MCV 84.5 04/05/2022 11:07 AM     Lab Results   Component Value Date/Time    Sodium 141 04/05/2022 11:07 AM    Potassium 3.8 04/05/2022 11:07 AM    Chloride 111 (H) 04/05/2022 11:07 AM    CO2 23 04/05/2022 11:07 AM    Anion gap 7 04/05/2022 11:07 AM    Glucose 168 (H) 04/05/2022 11:07 AM    BUN 9 04/05/2022 11:07 AM    Creatinine 1.06 04/05/2022 11:07 AM    BUN/Creatinine ratio 8 (L) 04/05/2022 11:07 AM    GFR est AA >60 04/05/2022 11:07 AM    GFR est non-AA >60 04/05/2022 11:07 AM    Calcium 8.9 04/05/2022 11:07 AM    Bilirubin, total 0.4 01/19/2022 03:35 PM    Alk. phosphatase 94 01/19/2022 03:35 PM    Protein, total 7.1 01/19/2022 03:35 PM    Albumin 3.5 01/19/2022 03:35 PM    Globulin 3.6 01/19/2022 03:35 PM    A-G Ratio 1.0 (L) 01/19/2022 03:35 PM    ALT (SGPT) 32 01/19/2022 03:35 PM     CrCl cannot be calculated (Unknown ideal weight.).       INR History:   (normal INR range 0.8-1.2)     Date   INR   PT   Dose/Comments  08/16/22 4.2  50.0 7.5 mg Fri, Sat; 5 mg daily ROW  07/25/22 3.4  41.2 7.5 mg Fri, Sat; 5 mg daily ROW  07/05/22 3.0  35.5 hold dose 6/20 then resume 7.5 mg Fri, Sat; 5 mg daily ROW  06/20/22 4.0  48.0 7.5 mg Fri, Sat; 5 mg daily ROW  05/12/22 2.7  32.7 7.5 mg Fri, Sat; 5 mg daily ROW  04/15/22 2.3  27.3 7.5 mg Fri, Sat; 5 mg daily ROW    Medications that can increase  INR: multivitamin, glimepiride  Medications that can decrease INR: metformin    A/P:       Anticoagulation:  Considering Mr. Dom Dunlap past history, todays findings, and per Anticoagulation Collaborative Practice Agreement/Protocol:    Fingerstick POC INR (4.2) is Supratherapeutic for INR goal today. Change warfarin to hold doses today (8/16) and tomorrow (8/17) and then take 7.5 mg Sat; 5 mg daily ROW  INR is 4.2 and supratherapeutic. Patient denies extra doses of warfarin. Patient reports no longer taking Amaryl and the addition of [place]. Patient reports consistent intake of vitamin K foods. Patient reports he had a dental procedure Monday, August 8th. Patient reports he was instructed by provider to hold warfarin for 5 days (8/3-8/7) prior the procedure. Patient reports restarting warfarin on 8/8 under the guidance of the dentist. Will reduce weekly dose from 40 mg to 37.5 mg (~ 6%) and patient will hold doses today (8/16) and tomorrow (8/17) and then take 7.5 mg Sat; 5 mg daily ROW. Patient to recheck INR in 1 week. Patient was instructed to schedule an appointment in 1 week(s) prior to leaving the clinic. Medication reconciliation was completed during the visit. There are no discontinued medications. A full discussion of the nature of anticoagulants has been carried out. A full discussion of the need for frequent and regular monitoring, precise dosage adjustment and compliance was stressed. Side effects of potential bleeding were discussed and Mr. Rivero was instructed to call [phone] if there are any signs of abnormal bleeding. Mr. Norma Inman was instructed to avoid any OTC items containing aspirin or ibuprofen and prior to starting any new OTC products to consult with his physician or pharmacist to ensure no drug interactions are present. Mr. Norma Inman was instructed to avoid any major changes in his general diet and to avoid alcohol consumption.     Mr. Norma Inman was provided information in the AVS that includes topics on understanding coumadin therapy, drug interaction considerations, vitamin K and coumadin use, interactions with foods and supplements containing vitamin K, and the use of herbal products. Mr. Zahida Green verbalized his understanding of all instructions and will call the office with any questions, concerns, or signs of abnormal bleeding or blood clot. Notifications of recommendations will be sent to Dr. Yoshi Dumont for review.     Thank you,   Roxanne Luis, [address] Tracking Only    Intervention Detail: Adherence Monitorin, Dose Adjustment: 1, reason: Therapy Optimization, and Lab(s) Ordered  Total # of Interventions Recommended: 3  Total # of Interventions Accepted: 3  Time Spent (min): 20

## 2022-08-16 ENCOUNTER — ANTI-COAG VISIT (OUTPATIENT)
Dept: PHARMACY | Age: 72
End: 2022-08-16
Payer: MEDICARE

## 2022-08-16 DIAGNOSIS — I48.0 PAF (PAROXYSMAL ATRIAL FIBRILLATION) (HCC): Primary | ICD-10-CM

## 2022-08-16 DIAGNOSIS — Z79.01 LONG TERM (CURRENT) USE OF ANTICOAGULANTS: ICD-10-CM

## 2022-08-16 LAB
INR BLD: 4.2
PT POC: 50 SECONDS
VALID INTERNAL CONTROL?: YES

## 2022-08-16 PROCEDURE — 99212 OFFICE O/P EST SF 10 MIN: CPT

## 2022-08-16 PROCEDURE — 85610 PROTHROMBIN TIME: CPT

## 2022-08-25 ENCOUNTER — ANTI-COAG VISIT (OUTPATIENT)
Dept: PHARMACY | Age: 72
End: 2022-08-25
Payer: MEDICARE

## 2022-08-25 DIAGNOSIS — I48.0 PAF (PAROXYSMAL ATRIAL FIBRILLATION) (HCC): Primary | ICD-10-CM

## 2022-08-25 DIAGNOSIS — Z79.01 LONG TERM (CURRENT) USE OF ANTICOAGULANTS: ICD-10-CM

## 2022-08-25 LAB
INR BLD: 2.5
PT POC: 30 SECONDS
VALID INTERNAL CONTROL?: YES

## 2022-08-25 PROCEDURE — 85610 PROTHROMBIN TIME: CPT

## 2022-08-25 PROCEDURE — 99212 OFFICE O/P EST SF 10 MIN: CPT

## 2022-08-25 NOTE — PROGRESS NOTES
Pharmacy Progress Note - Anticoagulation Management    S/O:  Mr. Macy Gates  is a 70 y.o. male seen today for anticoagulation management for the diagnosis of Atrial Fibrillation. HPI: At last visit (8/16), INR was 4.2 and supratherapeutic. Patient denied extra doses of warfarin. Patient reported no longer taking Amaryl and the addition of [place]. Patient reported consistent intake of vitamin K foods. Patient reported he had a dental procedure Monday, August 8th. Patient reported he was instructed by provider to hold warfarin for 5 days (8/3-8/7) prior the procedure. Patient reported restarting warfarin on 8/8 under the guidance of the dentist. Will reduce weekly dose from 40 mg to 37.5 mg (~ 6%) and patient will hold doses today (8/16) and tomorrow (8/17) and then take 7.5 mg Sat; 5 mg daily ROW. Patient to recheck INR in 1 week. Interim History:    Warfarin start date: Prior to 3/6/20 per chart review. INR Goal:  2.0-3.0    Current warfarin regimen: hold doses today (8/16) and tomorrow (8/17) and then take 7.5 mg Sat; 5 mg daily ROW  Warfarin tablet strength:   5 mg   Duration of therapy: Indefinite    Today's pertinent positives includes:  No significant changes since last visit      Results for orders placed or performed in visit on 08/25/22   POC PROTHROMBIN W/INR   Result Value Ref Range    VALID INTERNAL CONTROL POC Yes     Prothrombin time (POC) 30.0 seconds    INR POC 2.5        Adherence:   Able to recall regimen? YES  Miss/extra dose? NO  Need refill? NO      Upcoming procedure(s):  YES    Patient reports an upcoming dental procedure on 8/31 that will require a 5 day interruption in warfarin therapy.        Past Medical History:   Diagnosis Date    Atrial fibrillation (HCC)     CAD (coronary artery disease)     Chronic systolic HF (heart failure) (HCC)     Congestive heart failure (HCC)     DM type 2 (diabetes mellitus, type 2) (HCC)     Gout     HTN (hypertension)     Hypercholesterolemia ICD (implantable cardioverter-defibrillator) in place     Long term current use of anticoagulant therapy     ASA & Warfarin    Nonischemic cardiomyopathy ([facility] [address] 2/8/2019    S/P cardiac cath 2/8/2019 2/8/19 cardiac cath with nonobstructive disease    Stroke [facility])     tia IN 2019    Syncope      No Known Allergies  Current Outpatient Medications   Medication Sig    dapagliflozin (Farxiga) 5 mg tab tablet Take 5 mg by mouth in the morning. acetaminophen (TYLENOL) 500 mg tablet Take 1,000 mg by mouth daily as needed for Pain.    warfarin (COUMADIN) 5 mg tablet Take 1.5 tablets (7.5 mg) by mouth every Friday and Saturday. Take 1 tablet (5 mg) every Sunday, Monday, Tuesday, Wednesday and Thursday. Or take as directed by the clinic.    sacubitriL-valsartan (Entresto) 49-51 mg tab tablet Take 1 Tablet by mouth two (2) times a day. metFORMIN ER (GLUCOPHAGE XR) 500 mg tablet Take  by mouth. 3 tabs at night    atorvastatin (LIPITOR) 10 mg tablet TAKE 1 TABLET EVERY DAY    ACCU-CHEK DAVE PLUS METER misc USE TO TEST BLOOD SUGAR    multivitamin (ONE A DAY) tablet Take 1 Tab by mouth daily. aspirin delayed-release 81 mg tablet Take 81 mg by mouth daily. carvedilol (COREG) 25 mg tablet Take 25 mg by mouth two (2) times daily (with meals). potassium chloride (KLOR-CON M10) 10 mEq tablet Take 10 mEq by mouth two (2) times a day. furosemide (LASIX) 20 mg tablet Take 20 mg by mouth daily. amLODIPine (NORVASC) 5 mg tablet Take 5 mg by mouth daily. No current facility-administered medications for this visit.      Wt Readings from Last 3 Encounters:   05/12/22 218 lb (98.9 kg)   04/12/22 218 lb 9.6 oz (99.2 kg)   04/05/22 217 lb (98.4 kg)     BP Readings from Last 3 Encounters:   05/12/22 113/84   04/12/22 120/70   04/05/22 101/66     Pulse Readings from Last 3 Encounters:   05/12/22 69   04/12/22 70   04/05/22 75     Lab Results   Component Value Date/Time    WBC 4.7 04/05/2022 11:07 AM    HGB 16.7 04/05/2022 11:07 AM    HCT 49.0 04/05/2022 11:07 AM    PLATELET 446 (L) 06/04/3994 11:07 AM    MCV 84.5 04/05/2022 11:07 AM     Lab Results   Component Value Date/Time    Sodium 141 04/05/2022 11:07 AM    Potassium 3.8 04/05/2022 11:07 AM    Chloride 111 (H) 04/05/2022 11:07 AM    CO2 23 04/05/2022 11:07 AM    Anion gap 7 04/05/2022 11:07 AM    Glucose 168 (H) 04/05/2022 11:07 AM    BUN 9 04/05/2022 11:07 AM    Creatinine 1.06 04/05/2022 11:07 AM    BUN/Creatinine ratio 8 (L) 04/05/2022 11:07 AM    GFR est AA >60 04/05/2022 11:07 AM    GFR est non-AA >60 04/05/2022 11:07 AM    Calcium 8.9 04/05/2022 11:07 AM    Bilirubin, total 0.4 01/19/2022 03:35 PM    Alk. phosphatase 94 01/19/2022 03:35 PM    Protein, total 7.1 01/19/2022 03:35 PM    Albumin 3.5 01/19/2022 03:35 PM    Globulin 3.6 01/19/2022 03:35 PM    A-G Ratio 1.0 (L) 01/19/2022 03:35 PM    ALT (SGPT) 32 01/19/2022 03:35 PM     CrCl cannot be calculated (Unknown ideal weight.). INR History:   (normal INR range 0.8-1.2)     Date   INR   PT   Dose/Comments  08/25/22 2.5  30.0 hold doses 8/16, 8/17 then 7.5 mg Sat; 5 mg daily ROW   08/16/22 4.2  50.0 7.5 mg Fri, Sat; 5 mg daily ROW  07/25/22 3.4  41.2 7.5 mg Fri, Sat; 5 mg daily ROW  07/05/22 3.0  35.5 hold dose 6/20 then resume 7.5 mg Fri, Sat; 5 mg daily ROW  06/20/22 4.0  48.0 7.5 mg Fri, Sat; 5 mg daily ROW  05/12/22 2.7  32.7 7.5 mg Fri, Sat; 5 mg daily ROW  04/15/22 2.3  27.3 7.5 mg Fri, Sat; 5 mg daily ROW    Medications that can increase  INR: multivitamin, glimepiride  Medications that can decrease INR: metformin    A/P:       Anticoagulation:  Considering Mr. Tracie Crow past history, todays findings, and per Anticoagulation Collaborative Practice Agreement/Protocol:    Fingerstick POC INR (2.5) is Therapeutic for INR goal today. Change warfarin to 5 mg today (8/25) and then hold warfarin for 5 days (8/26-8/30)  INR is 2.5 and therapeutic.  Patient denies changes to his medications and reports consistent intake of vitamin K foods. Patient reports an upcoming dental procedure on  that will require a 5 day interruption in warfarin therapy. Patient will take warfarin 5 mg today () and then hold warfarin for 5 days (-). Patient will recheck INR prior to dental procedure on . Patient was instructed to schedule an appointment in 1 week(s) prior to leaving the clinic. Medication reconciliation was completed during the visit. There are no discontinued medications. A full discussion of the nature of anticoagulants has been carried out. A full discussion of the need for frequent and regular monitoring, precise dosage adjustment and compliance was stressed. Side effects of potential bleeding were discussed and Mr. Rivero was instructed to call [phone] if there are any signs of abnormal bleeding. Mr. Michael Lacey was instructed to avoid any OTC items containing aspirin or ibuprofen and prior to starting any new OTC products to consult with his physician or pharmacist to ensure no drug interactions are present. Mr. Michael Lacey was instructed to avoid any major changes in his general diet and to avoid alcohol consumption. Mr. Michael Lacey was provided information in the AVS that includes topics on understanding coumadin therapy, drug interaction considerations, vitamin K and coumadin use, interactions with foods and supplements containing vitamin K, and the use of herbal products. Mr. Michael Lacey verbalized his understanding of all instructions and will call the office with any questions, concerns, or signs of abnormal bleeding or blood clot. Notifications of recommendations will be sent to Dr. Sami Bermudez for review.     Thank you,   GENTRY Marcus/ [address] Tracking Only    Intervention Detail: Adherence Monitorin, Dose Adjustment: 1, reason: Therapy Optimization, and Lab(s) Ordered  Total # of Interventions Recommended: 3  Total # of Interventions Accepted: 3  Time Spent (min): 20

## 2022-08-25 NOTE — PATIENT INSTRUCTIONS
Today your INR was 2.5 . Your goal INR is  2.0-3.0 . You have a 5 mg tablet of Coumadin (warfarin). Take Coumadin (warfarin) as follows: Take 5 mg (1 tablet) on today (8/25/22) and then hold doses from 8/26/22-8/30/22. Come back in 1 week(s) for your next finger stick/INR blood test.        Avoid any over the counter items containing aspirin or ibuprofen, and avoid great swings in general diet. Avoid alcohol consumption. Please notify the INR nurse if you are started on any new medication including over the counter or herbal supplements. Also, please notify your INR nurse if any of your other prescription or over the counter medications have been discontinued. Call [facility] at [phone] if you have any signs of abnormal bleeding/blood clot.  ------------------------------------------------------------------------------------------------------------------  Taking Warfarin Safely: Care Instructions    Your Care Instructions  Warfarin is a medicine that you take to prevent blood clots. It is often called a blood thinner. Doctors give warfarin (such as Coumadin) to reduce the risk of blood clots. You may be at risk for blood clots if you have atrial fibrillation or deep vein thrombosis. Some other health problems may also put you at risk. Warfarin slows the amount of time it takes for your blood to clot. It can cause bleeding problems. Even if you've been taking warfarin for a while, it's important to know how to take it safely. Foods and other medicines can affect the way warfarin works. Some can make warfarin work too well. This can cause bleeding problems. And some can make it work poorly, so that it does not prevent blood clots very well. You will need regular blood tests to check how long it takes for your blood to form a clot. This test is called a PT or prothrombin time test. The result of the test is called an INR level.  Depending on the test results, your doctor or anticoagulation clinic may adjust your dose of warfarin. Follow-up care is a key part of your treatment and safety. Be sure to make and go to all appointments, and call your doctor if you are having problems. It's also a good idea to know your test results and keep a list of the medicines you take. How can you care for yourself at home? Take warfarin safely  Take your warfarin at the same time each day. If you miss a dose of warfarin, don't take an extra dose to make up for it. Your doctor can tell you exactly what to do so you don't take too much or too little. Wear medical alert jewelry that lets others know that you take warfarin. You can buy this at most drugstores. Don't take warfarin if you are pregnant or planning to get pregnant. Talk to your doctor about how you can prevent getting pregnant while you are taking it. Don't change your dose or stop taking warfarin unless your doctor tells you to. Effects of medicines and food on warfarin  Don't start or stop taking any medicines, vitamins, or natural remedies unless you first talk to your doctor. Many medicines can affect how warfarin works. These include aspirin and other pain relievers, over-the-counter medicines, multivitamins, dietary supplements, and herbal products. Tell all of your doctors and pharmacists that you take warfarin. Some prescription medicines can affect how warfarin works. Keep the amount of vitamin K in your diet about the same from day to day. Do not suddenly eat a lot more or a lot less food that is rich in vitamin K than you usually do. Vitamin K affects how warfarin works and how your blood clots. Talk with your doctor before making big changes in your diet. Vitamin K is in many foods, such as:  Leafy greens, such as kale, cabbage, spinach, Swiss chard, and lettuce. Canola and soybean oils. Green vegetables, such as asparagus, broccoli, and [place] sprouts.   Vegetable drinks, green tea leaves, and some dietary supplement drinks. Avoid cranberry juice and other cranberry products. They can increase the effects of warfarin. Limit your use of alcohol. Avoid bleeding by preventing falls and injuries  Wear slippers or shoes with nonskid soles. Remove throw rugs and clutter. Rearrange furniture and electrical cords to keep them out of walking paths. Keep stairways, porches, and outside walkways well lit. Use night-lights in hallways and bathrooms. Be extra careful when you work with sharp tools or knives. When should you call for help? Call 911 anytime you think you may need emergency care. For example, call if:  You have a sudden, severe headache that is different from past headaches. Call your doctor now or seek immediate medical care if:  You have any abnormal bleeding, such as:  Nosebleeds. Vaginal bleeding that is different (heavier, more frequent, at a different time of the month) than what you are used to. Bloody or black stools, or rectal bleeding. Bloody or pink urine. Watch closely for changes in your health, and be sure to contact your doctor if you have any problems. Where can you learn more? Go to http://www.gray.com/. Enter C167 in the search box to learn more about \"Taking Warfarin Safely: Care Instructions. \"  Current as of: January 27, 2016  Content Version: 11.1  © 5113-1310 [employer]. Care instructions adapted under license by "[employer] (which disclaims liability or warranty for this information). If you have questions about a medical condition or this instruction, always ask your healthcare professional. [place] [address] any warranty or liability for your use of this information.

## 2022-08-30 NOTE — PATIENT INSTRUCTIONS
Today your INR was 1.2 . Your goal INR is  2.0-3.0 . You have a 5 mg tablet of Coumadin (warfarin). Take Coumadin (warfarin) as follows: Take 7.5 mg (1.5 tablets) every Saturday; and 5 mg (1 tablet) daily rest of the week. Come back in 1 week(s) for your next finger stick/INR blood test.        Avoid any over the counter items containing aspirin or ibuprofen, and avoid great swings in general diet. Avoid alcohol consumption. Please notify the INR nurse if you are started on any new medication including over the counter or herbal supplements. Also, please notify your INR nurse if any of your other prescription or over the counter medications have been discontinued. Call [facility] at [phone] if you have any signs of abnormal bleeding/blood clot.  ------------------------------------------------------------------------------------------------------------------  Taking Warfarin Safely: Care Instructions    Your Care Instructions  Warfarin is a medicine that you take to prevent blood clots. It is often called a blood thinner. Doctors give warfarin (such as Coumadin) to reduce the risk of blood clots. You may be at risk for blood clots if you have atrial fibrillation or deep vein thrombosis. Some other health problems may also put you at risk. Warfarin slows the amount of time it takes for your blood to clot. It can cause bleeding problems. Even if you've been taking warfarin for a while, it's important to know how to take it safely. Foods and other medicines can affect the way warfarin works. Some can make warfarin work too well. This can cause bleeding problems. And some can make it work poorly, so that it does not prevent blood clots very well. You will need regular blood tests to check how long it takes for your blood to form a clot. This test is called a PT or prothrombin time test. The result of the test is called an INR level.  Depending on the test results, your doctor or anticoagulation clinic may adjust your dose of warfarin. Follow-up care is a key part of your treatment and safety. Be sure to make and go to all appointments, and call your doctor if you are having problems. It's also a good idea to know your test results and keep a list of the medicines you take. How can you care for yourself at home? Take warfarin safely  Take your warfarin at the same time each day. If you miss a dose of warfarin, don't take an extra dose to make up for it. Your doctor can tell you exactly what to do so you don't take too much or too little. Wear medical alert jewelry that lets others know that you take warfarin. You can buy this at most drugstores. Don't take warfarin if you are pregnant or planning to get pregnant. Talk to your doctor about how you can prevent getting pregnant while you are taking it. Don't change your dose or stop taking warfarin unless your doctor tells you to. Effects of medicines and food on warfarin  Don't start or stop taking any medicines, vitamins, or natural remedies unless you first talk to your doctor. Many medicines can affect how warfarin works. These include aspirin and other pain relievers, over-the-counter medicines, multivitamins, dietary supplements, and herbal products. Tell all of your doctors and pharmacists that you take warfarin. Some prescription medicines can affect how warfarin works. Keep the amount of vitamin K in your diet about the same from day to day. Do not suddenly eat a lot more or a lot less food that is rich in vitamin K than you usually do. Vitamin K affects how warfarin works and how your blood clots. Talk with your doctor before making big changes in your diet. Vitamin K is in many foods, such as:  Leafy greens, such as kale, cabbage, spinach, Swiss chard, and lettuce. Canola and soybean oils. Green vegetables, such as asparagus, broccoli, and [place] sprouts.   Vegetable drinks, green tea leaves, and some dietary supplement drinks. Avoid cranberry juice and other cranberry products. They can increase the effects of warfarin. Limit your use of alcohol. Avoid bleeding by preventing falls and injuries  Wear slippers or shoes with nonskid soles. Remove throw rugs and clutter. Rearrange furniture and electrical cords to keep them out of walking paths. Keep stairways, porches, and outside walkways well lit. Use night-lights in hallways and bathrooms. Be extra careful when you work with sharp tools or knives. When should you call for help? Call 911 anytime you think you may need emergency care. For example, call if:  You have a sudden, severe headache that is different from past headaches. Call your doctor now or seek immediate medical care if:  You have any abnormal bleeding, such as:  Nosebleeds. Vaginal bleeding that is different (heavier, more frequent, at a different time of the month) than what you are used to. Bloody or black stools, or rectal bleeding. Bloody or pink urine. Watch closely for changes in your health, and be sure to contact your doctor if you have any problems. Where can you learn more? Go to http://www.gray.com/. Enter A711 in the search box to learn more about \"Taking Warfarin Safely: Care Instructions. \"  Current as of: January 27, 2016  Content Version: 11.1  © 5800-5798 [employer], [employer]. Care instructions adapted under license by [email or web address] (which disclaims liability or warranty for this information). If you have questions about a medical condition or this instruction, always ask your healthcare professional. [place] [address] any warranty or liability for your use of this information.

## 2022-08-30 NOTE — PROGRESS NOTES
Pharmacy Progress Note - Anticoagulation Management    S/O:  Mr. Dionne Sweeney  is a 70 y.o. male seen today for anticoagulation management for the diagnosis of Atrial Fibrillation. HPI: At last visit (8/25), INR was 2.5 and therapeutic. Patient denied changes to his medications and reported consistent intake of vitamin K foods. Patient reported an upcoming dental procedure on 8/31 that will require a 5 day interruption in warfarin therapy. Patient will take warfarin 5 mg today (8/25) and then hold warfarin for 5 days (8/26-8/30). Patient will recheck INR prior to dental procedure on 8/31. Interim History:    Warfarin start date: Prior to 3/6/20 per chart review. INR Goal:  2.0-3.0    Current warfarin regimen:  5 mg today (8/25) and then hold warfarin for 5 days (8/26-8/30)  Warfarin tablet strength:   5 mg   Duration of therapy: Indefinite    Today's pertinent positives includes:  No significant changes since last visit      Results for orders placed or performed in visit on 08/31/22   POC PROTHROMBIN W/INR   Result Value Ref Range    VALID INTERNAL CONTROL POC Yes     Prothrombin time (POC) 14.2 seconds    INR POC 1.2        Adherence:   Able to recall regimen? YES  Miss/extra dose? NO  Need refill? NO      Upcoming procedure(s):  YES    Patient reports an upcoming dental procedure on today (8/31) that required a 5 day interruption in warfarin therapy.        Past Medical History:   Diagnosis Date    Atrial fibrillation (HCC)     CAD (coronary artery disease)     Chronic systolic HF (heart failure) (HCC)     Congestive heart failure (HCC)     DM type 2 (diabetes mellitus, type 2) (HCC)     Gout     HTN (hypertension)     Hypercholesterolemia     ICD (implantable cardioverter-defibrillator) in place     Long term current use of anticoagulant therapy     ASA & Warfarin    Nonischemic cardiomyopathy ([facility] [address] 2/8/2019    S/P cardiac cath 2/8/2019 2/8/19 cardiac cath with nonobstructive disease    Stroke ([facility]     tia IN 2019    Syncope      No Known Allergies  Current Outpatient Medications   Medication Sig    dapagliflozin (Farxiga) 5 mg tab tablet Take 5 mg by mouth in the morning. acetaminophen (TYLENOL) 500 mg tablet Take 1,000 mg by mouth daily as needed for Pain.    warfarin (COUMADIN) 5 mg tablet Take 1.5 tablets (7.5 mg) by mouth every Friday and Saturday. Take 1 tablet (5 mg) every Sunday, Monday, Tuesday, Wednesday and Thursday. Or take as directed by the clinic.    sacubitriL-valsartan (Entresto) 49-51 mg tab tablet Take 1 Tablet by mouth two (2) times a day. metFORMIN ER (GLUCOPHAGE XR) 500 mg tablet Take  by mouth. 3 tabs at night    atorvastatin (LIPITOR) 10 mg tablet TAKE 1 TABLET EVERY DAY    ACCU-CHEK DAVE PLUS METER misc USE TO TEST BLOOD SUGAR    multivitamin (ONE A DAY) tablet Take 1 Tab by mouth daily. aspirin delayed-release 81 mg tablet Take 81 mg by mouth daily. carvedilol (COREG) 25 mg tablet Take 25 mg by mouth two (2) times daily (with meals). potassium chloride (KLOR-CON M10) 10 mEq tablet Take 10 mEq by mouth two (2) times a day. furosemide (LASIX) 20 mg tablet Take 20 mg by mouth daily. amLODIPine (NORVASC) 5 mg tablet Take 5 mg by mouth daily. No current facility-administered medications for this visit.      Wt Readings from Last 3 Encounters:   05/12/22 218 lb (98.9 kg)   04/12/22 218 lb 9.6 oz (99.2 kg)   04/05/22 217 lb (98.4 kg)     BP Readings from Last 3 Encounters:   05/12/22 113/84   04/12/22 120/70   04/05/22 101/66     Pulse Readings from Last 3 Encounters:   05/12/22 69   04/12/22 70   04/05/22 75     Lab Results   Component Value Date/Time    WBC 4.7 04/05/2022 11:07 AM    HGB 16.7 04/05/2022 11:07 AM    HCT 49.0 04/05/2022 11:07 AM    PLATELET 372 (L) 98/76/1976 11:07 AM    MCV 84.5 04/05/2022 11:07 AM     Lab Results   Component Value Date/Time    Sodium 141 04/05/2022 11:07 AM    Potassium 3.8 04/05/2022 11:07 AM    Chloride 111 (H) 04/05/2022 11:07 AM    CO2 23 04/05/2022 11:07 AM    Anion gap 7 04/05/2022 11:07 AM    Glucose 168 (H) 04/05/2022 11:07 AM    BUN 9 04/05/2022 11:07 AM    Creatinine 1.06 04/05/2022 11:07 AM    BUN/Creatinine ratio 8 (L) 04/05/2022 11:07 AM    GFR est AA >60 04/05/2022 11:07 AM    GFR est non-AA >60 04/05/2022 11:07 AM    Calcium 8.9 04/05/2022 11:07 AM    Bilirubin, total 0.4 01/19/2022 03:35 PM    Alk. phosphatase 94 01/19/2022 03:35 PM    Protein, total 7.1 01/19/2022 03:35 PM    Albumin 3.5 01/19/2022 03:35 PM    Globulin 3.6 01/19/2022 03:35 PM    A-G Ratio 1.0 (L) 01/19/2022 03:35 PM    ALT (SGPT) 32 01/19/2022 03:35 PM     CrCl cannot be calculated (Unknown ideal weight.). INR History:   (normal INR range 0.8-1.2)     Date   INR   PT   Dose/Comments  08/31/22 1.2  14.2 5 mg 8/25 then hold for 5 days (8/26-8/30)  08/25/22 2.5  30.0 hold doses 8/16, 8/17 then 7.5 mg Sat; 5 mg daily ROW   08/16/22 4.2  50.0 7.5 mg Fri, Sat; 5 mg daily ROW  07/25/22 3.4  41.2 7.5 mg Fri, Sat; 5 mg daily ROW  07/05/22 3.0  35.5 hold dose 6/20 then resume 7.5 mg Fri, Sat; 5 mg daily ROW  06/20/22 4.0  48.0 7.5 mg Fri, Sat; 5 mg daily ROW  05/12/22 2.7  32.7 7.5 mg Fri, Sat; 5 mg daily ROW  04/15/22 2.3  27.3 7.5 mg Fri, Sat; 5 mg daily ROW    Medications that can increase  INR: multivitamin, glimepiride  Medications that can decrease INR: metformin    A/P:       Anticoagulation:  Considering Mr. Sam Dickey past history, todays findings, and per Anticoagulation Collaborative Practice Agreement/Protocol:    Fingerstick POC INR (1.2) is Subtherapeutic for INR goal today. Change warfarin to restart 7.5 mg Sat; 5 mg daily ROW following his dental procedure today (8/31) under the guidance of the dentist.  INR is 1.2 and subtherapeutic. Patient held warfarin for the last 5 days for a dental procedure today (8/31). Patient had to confirm INR was subtherapeutic prior to the procedure.  Patient will restart warfarin today (8/31) at 7.5 mg Sat; 5 mg daily ROW under the guidance of the dentist. Patient will recheck INR in 1 week. Patient was instructed to schedule an appointment in 1 week(s) prior to leaving the clinic. Medication reconciliation was completed during the visit. There are no discontinued medications. A full discussion of the nature of anticoagulants has been carried out. A full discussion of the need for frequent and regular monitoring, precise dosage adjustment and compliance was stressed. Side effects of potential bleeding were discussed and Mr. Rivero was instructed to call [phone] if there are any signs of abnormal bleeding. Mr. Lila Gallegos was instructed to avoid any OTC items containing aspirin or ibuprofen and prior to starting any new OTC products to consult with his physician or pharmacist to ensure no drug interactions are present. Mr. Lila Gallegos was instructed to avoid any major changes in his general diet and to avoid alcohol consumption. Mr. Lila Gallegos was provided information in the AVS that includes topics on understanding coumadin therapy, drug interaction considerations, vitamin K and coumadin use, interactions with foods and supplements containing vitamin K, and the use of herbal products. Mr. Lila Gallegos verbalized his understanding of all instructions and will call the office with any questions, concerns, or signs of abnormal bleeding or blood clot. Notifications of recommendations will be sent to Dr. Kai Lowe for review.     Thank you,   Sherry Chase, [id number] [facility] Tracking Only    Intervention Detail: Adherence Monitorin, Dose Adjustment: 1, reason: Therapy Optimization, and Lab(s) Ordered  Total # of Interventions Recommended: 3  Total # of Interventions Accepted: 3  Time Spent (min): 20

## 2022-08-31 ENCOUNTER — ANTI-COAG VISIT (OUTPATIENT)
Dept: PHARMACY | Age: 72
End: 2022-08-31
Payer: MEDICARE

## 2022-08-31 DIAGNOSIS — I48.0 PAF (PAROXYSMAL ATRIAL FIBRILLATION) (HCC): Primary | ICD-10-CM

## 2022-08-31 DIAGNOSIS — Z79.01 LONG TERM (CURRENT) USE OF ANTICOAGULANTS: ICD-10-CM

## 2022-08-31 LAB
INR BLD: 1.2
PT POC: 14.2 SECONDS
VALID INTERNAL CONTROL?: YES

## 2022-08-31 PROCEDURE — 85610 PROTHROMBIN TIME: CPT

## 2022-08-31 PROCEDURE — 99212 OFFICE O/P EST SF 10 MIN: CPT

## 2022-09-08 ENCOUNTER — ANTI-COAG VISIT (OUTPATIENT)
Dept: PHARMACY | Age: 72
End: 2022-09-08
Payer: MEDICARE

## 2022-09-08 DIAGNOSIS — Z79.01 LONG TERM (CURRENT) USE OF ANTICOAGULANTS: ICD-10-CM

## 2022-09-08 DIAGNOSIS — I48.0 PAF (PAROXYSMAL ATRIAL FIBRILLATION) (HCC): Primary | ICD-10-CM

## 2022-09-08 LAB
INR BLD: 2.1
PT POC: 25.1 SECONDS
VALID INTERNAL CONTROL?: YES

## 2022-09-08 PROCEDURE — 85610 PROTHROMBIN TIME: CPT

## 2022-09-08 PROCEDURE — 99211 OFF/OP EST MAY X REQ PHY/QHP: CPT

## 2022-09-08 NOTE — PATIENT INSTRUCTIONS
Today your INR was 2.1 . Your goal INR is  2.0-3.0 . You have a 5 mg tablet of Coumadin (warfarin). Take Coumadin (warfarin) as follows: Take 7.5 mg (1.5 tablets) every Saturday; and 5 mg (1 tablet) daily rest of the week. Come back in 2 week(s) for your next finger stick/INR blood test.        Avoid any over the counter items containing aspirin or ibuprofen, and avoid great swings in general diet. Avoid alcohol consumption. Please notify the INR nurse if you are started on any new medication including over the counter or herbal supplements. Also, please notify your INR nurse if any of your other prescription or over the counter medications have been discontinued. Call [facility] at [phone] if you have any signs of abnormal bleeding/blood clot.  ------------------------------------------------------------------------------------------------------------------  Taking Warfarin Safely: Care Instructions    Your Care Instructions  Warfarin is a medicine that you take to prevent blood clots. It is often called a blood thinner. Doctors give warfarin (such as Coumadin) to reduce the risk of blood clots. You may be at risk for blood clots if you have atrial fibrillation or deep vein thrombosis. Some other health problems may also put you at risk. Warfarin slows the amount of time it takes for your blood to clot. It can cause bleeding problems. Even if you've been taking warfarin for a while, it's important to know how to take it safely. Foods and other medicines can affect the way warfarin works. Some can make warfarin work too well. This can cause bleeding problems. And some can make it work poorly, so that it does not prevent blood clots very well. You will need regular blood tests to check how long it takes for your blood to form a clot. This test is called a PT or prothrombin time test. The result of the test is called an INR level.  Depending on the test results, your doctor or anticoagulation clinic may adjust your dose of warfarin. Follow-up care is a key part of your treatment and safety. Be sure to make and go to all appointments, and call your doctor if you are having problems. It's also a good idea to know your test results and keep a list of the medicines you take. How can you care for yourself at home? Take warfarin safely  Take your warfarin at the same time each day. If you miss a dose of warfarin, don't take an extra dose to make up for it. Your doctor can tell you exactly what to do so you don't take too much or too little. Wear medical alert jewelry that lets others know that you take warfarin. You can buy this at most drugstores. Don't take warfarin if you are pregnant or planning to get pregnant. Talk to your doctor about how you can prevent getting pregnant while you are taking it. Don't change your dose or stop taking warfarin unless your doctor tells you to. Effects of medicines and food on warfarin  Don't start or stop taking any medicines, vitamins, or natural remedies unless you first talk to your doctor. Many medicines can affect how warfarin works. These include aspirin and other pain relievers, over-the-counter medicines, multivitamins, dietary supplements, and herbal products. Tell all of your doctors and pharmacists that you take warfarin. Some prescription medicines can affect how warfarin works. Keep the amount of vitamin K in your diet about the same from day to day. Do not suddenly eat a lot more or a lot less food that is rich in vitamin K than you usually do. Vitamin K affects how warfarin works and how your blood clots. Talk with your doctor before making big changes in your diet. Vitamin K is in many foods, such as:  Leafy greens, such as kale, cabbage, spinach, Swiss chard, and lettuce. Canola and soybean oils. Green vegetables, such as asparagus, broccoli, and [place] sprouts.   Vegetable drinks, green tea leaves, and some dietary supplement drinks. Avoid cranberry juice and other cranberry products. They can increase the effects of warfarin. Limit your use of alcohol. Avoid bleeding by preventing falls and injuries  Wear slippers or shoes with nonskid soles. Remove throw rugs and clutter. Rearrange furniture and electrical cords to keep them out of walking paths. Keep stairways, porches, and outside walkways well lit. Use night-lights in hallways and bathrooms. Be extra careful when you work with sharp tools or knives. When should you call for help? Call 911 anytime you think you may need emergency care. For example, call if:  You have a sudden, severe headache that is different from past headaches. Call your doctor now or seek immediate medical care if:  You have any abnormal bleeding, such as:  Nosebleeds. Vaginal bleeding that is different (heavier, more frequent, at a different time of the month) than what you are used to. Bloody or black stools, or rectal bleeding. Bloody or pink urine. Watch closely for changes in your health, and be sure to contact your doctor if you have any problems. Where can you learn more? Go to http://www.gray.com/. Enter Y294 in the search box to learn more about \"Taking Warfarin Safely: Care Instructions. \"  Current as of: January 27, 2016  Content Version: 11.1  © 2512-5653 [employer], [employer]. Care instructions adapted under license by [employer] (which disclaims liability or warranty for this information). If you have questions about a medical condition or this instruction, always ask your healthcare professional. [place] [address] any warranty or liability for your use of this information.

## 2022-09-08 NOTE — PROGRESS NOTES
Pharmacy Progress Note - Anticoagulation Management    S/O:  Mr. Christina Glez  is a 70 y.o. male seen today for anticoagulation management for the diagnosis of Atrial Fibrillation. HPI: At last visit (8/31), INR was 1.2 and subtherapeutic. Patient held warfarin for the last 5 days for a dental procedure today (8/31). Patient had to confirm INR was subtherapeutic prior to the procedure. Patient will restart warfarin today (8/31) at 7.5 mg Sat; 5 mg daily ROW under the guidance of the dentist. Patient will recheck INR in 1 week. Interim History:    Warfarin start date: Prior to 3/6/20 per chart review. INR Goal:  2.0-3.0    Current warfarin regimen:  7.5 mg Sat; 5 mg daily ROW   Warfarin tablet strength:   5 mg   Duration of therapy: Indefinite    Today's pertinent positives includes:  No significant changes since last visit      Results for orders placed or performed in visit on 09/08/22   POC PROTHROMBIN W/INR   Result Value Ref Range    VALID INTERNAL CONTROL POC Yes     Prothrombin time (POC) 25.1 seconds    INR POC 2.1        Adherence:   Able to recall regimen? YES  Miss/extra dose? NO  Need refill? NO      Upcoming procedure(s):  N/A      Past Medical History:   Diagnosis Date    Atrial fibrillation (HCC)     CAD (coronary artery disease)     Chronic systolic HF (heart failure) (HCC)     Congestive heart failure (HCC)     DM type 2 (diabetes mellitus, type 2) (HCC)     Gout     HTN (hypertension)     Hypercholesterolemia     ICD (implantable cardioverter-defibrillator) in place     Long term current use of anticoagulant therapy     ASA & Warfarin    Nonischemic cardiomyopathy ([facility] [address] 2/8/2019    S/P cardiac cath 2/8/2019 2/8/19 cardiac cath with nonobstructive disease    Stroke [facility])     tia IN 2019    Syncope      No Known Allergies  Current Outpatient Medications   Medication Sig    dapagliflozin (Farxiga) 5 mg tab tablet Take 5 mg by mouth in the morning.     acetaminophen (TYLENOL) 500 mg tablet Take 1,000 mg by mouth daily as needed for Pain.    warfarin (COUMADIN) 5 mg tablet Take 1.5 tablets (7.5 mg) by mouth every Friday and Saturday. Take 1 tablet (5 mg) every Sunday, Monday, Tuesday, Wednesday and Thursday. Or take as directed by the clinic.    sacubitriL-valsartan (Entresto) 49-51 mg tab tablet Take 1 Tablet by mouth two (2) times a day. metFORMIN ER (GLUCOPHAGE XR) 500 mg tablet Take  by mouth. 3 tabs at night    atorvastatin (LIPITOR) 10 mg tablet TAKE 1 TABLET EVERY DAY    ACCU-CHEK DAVE PLUS METER misc USE TO TEST BLOOD SUGAR    multivitamin (ONE A DAY) tablet Take 1 Tab by mouth daily. aspirin delayed-release 81 mg tablet Take 81 mg by mouth daily. carvedilol (COREG) 25 mg tablet Take 25 mg by mouth two (2) times daily (with meals). potassium chloride (KLOR-CON M10) 10 mEq tablet Take 10 mEq by mouth two (2) times a day. furosemide (LASIX) 20 mg tablet Take 20 mg by mouth daily. amLODIPine (NORVASC) 5 mg tablet Take 5 mg by mouth daily. No current facility-administered medications for this visit.      Wt Readings from Last 3 Encounters:   05/12/22 218 lb (98.9 kg)   04/12/22 218 lb 9.6 oz (99.2 kg)   04/05/22 217 lb (98.4 kg)     BP Readings from Last 3 Encounters:   05/12/22 113/84   04/12/22 120/70   04/05/22 101/66     Pulse Readings from Last 3 Encounters:   05/12/22 69   04/12/22 70   04/05/22 75     Lab Results   Component Value Date/Time    WBC 4.7 04/05/2022 11:07 AM    HGB 16.7 04/05/2022 11:07 AM    HCT 49.0 04/05/2022 11:07 AM    PLATELET 681 (L) 42/48/5896 11:07 AM    MCV 84.5 04/05/2022 11:07 AM     Lab Results   Component Value Date/Time    Sodium 141 04/05/2022 11:07 AM    Potassium 3.8 04/05/2022 11:07 AM    Chloride 111 (H) 04/05/2022 11:07 AM    CO2 23 04/05/2022 11:07 AM    Anion gap 7 04/05/2022 11:07 AM    Glucose 168 (H) 04/05/2022 11:07 AM    BUN 9 04/05/2022 11:07 AM    Creatinine 1.06 04/05/2022 11:07 AM    BUN/Creatinine ratio 8 (L) 04/05/2022 11:07 AM    GFR est AA >60 04/05/2022 11:07 AM    GFR est non-AA >60 04/05/2022 11:07 AM    Calcium 8.9 04/05/2022 11:07 AM    Bilirubin, total 0.4 01/19/2022 03:35 PM    Alk. phosphatase 94 01/19/2022 03:35 PM    Protein, total 7.1 01/19/2022 03:35 PM    Albumin 3.5 01/19/2022 03:35 PM    Globulin 3.6 01/19/2022 03:35 PM    A-G Ratio 1.0 (L) 01/19/2022 03:35 PM    ALT (SGPT) 32 01/19/2022 03:35 PM     CrCl cannot be calculated (Unknown ideal weight.). INR History:   (normal INR range 0.8-1.2)     Date   INR   PT   Dose/Comments  09/08/22 2.1  25.1 7.5 mg Sat; 5 mg daily ROW   08/31/22 1.2  14.2 5 mg 8/25 then hold for 5 days (8/26-8/30)  08/25/22 2.5  30.0 hold doses 8/16, 8/17 then 7.5 mg Sat; 5 mg daily ROW   08/16/22 4.2  50.0 7.5 mg Fri, Sat; 5 mg daily ROW  07/25/22 3.4  41.2 7.5 mg Fri, Sat; 5 mg daily ROW  07/05/22 3.0  35.5 hold dose 6/20 then resume 7.5 mg Fri, Sat; 5 mg daily ROW  06/20/22 4.0  48.0 7.5 mg Fri, Sat; 5 mg daily ROW  05/12/22 2.7  32.7 7.5 mg Fri, Sat; 5 mg daily ROW  04/15/22 2.3  27.3 7.5 mg Fri, Sat; 5 mg daily ROW    Medications that can increase  INR: multivitamin, glimepiride  Medications that can decrease INR: metformin    A/P:       Anticoagulation:  Considering Mr. Lavon Hemphill past history, todays findings, and per Anticoagulation Collaborative Practice Agreement/Protocol:    Fingerstick POC INR (2.1) is Therapeutic for INR goal today. Continue warfarin 7.5 mg Sat; 5 mg daily ROW   INR is 2.1 and therapeutic. Patient denies changes to his medications and reports consistent intake of vitamin K foods. Patient will continue current regimen and recheck INR in 2 week(s). Patient was instructed to schedule an appointment in 2 week(s) prior to leaving the clinic. Medication reconciliation was completed during the visit. There are no discontinued medications. A full discussion of the nature of anticoagulants has been carried out.   A full discussion of the need for frequent and regular monitoring, precise dosage adjustment and compliance was stressed. Side effects of potential bleeding were discussed and Mr. Rivero was instructed to call [phone] if there are any signs of abnormal bleeding. Mr. Dyan Howell was instructed to avoid any OTC items containing aspirin or ibuprofen and prior to starting any new OTC products to consult with his physician or pharmacist to ensure no drug interactions are present. Mr. Dyan Howell was instructed to avoid any major changes in his general diet and to avoid alcohol consumption. Mr. Dyan Howell was provided information in the AVS that includes topics on understanding coumadin therapy, drug interaction considerations, vitamin K and coumadin use, interactions with foods and supplements containing vitamin K, and the use of herbal products. Mr. Dyan Howell verbalized his understanding of all instructions and will call the office with any questions, concerns, or signs of abnormal bleeding or blood clot. Notifications of recommendations will be sent to Dr. Gosia Wade for review.     Thank you,   Augustus Marcelino, [address] [facility] Tracking Only    Intervention Detail: Adherence Monitorin and Lab(s) Ordered  Total # of Interventions Recommended: 2  Total # of Interventions Accepted: 2  Time Spent (min): 20

## 2022-09-22 ENCOUNTER — ANTI-COAG VISIT (OUTPATIENT)
Dept: PHARMACY | Age: 72
End: 2022-09-22
Payer: MEDICARE

## 2022-09-22 DIAGNOSIS — Z79.01 LONG TERM (CURRENT) USE OF ANTICOAGULANTS: ICD-10-CM

## 2022-09-22 DIAGNOSIS — I48.0 PAF (PAROXYSMAL ATRIAL FIBRILLATION) (HCC): Primary | ICD-10-CM

## 2022-09-22 LAB
INR BLD: 3.1
PT POC: 37.2 SECONDS
VALID INTERNAL CONTROL?: YES

## 2022-09-22 PROCEDURE — 85610 PROTHROMBIN TIME: CPT

## 2022-09-22 PROCEDURE — 99211 OFF/OP EST MAY X REQ PHY/QHP: CPT

## 2022-09-22 NOTE — PATIENT INSTRUCTIONS
Today your INR was 3.1 . Your goal INR is  2.0-3.0 . You have a 5 mg tablet of Coumadin (warfarin). Take Coumadin (warfarin) as follows: Take 7.5 mg (1.5 tablets) every Saturday; and 5 mg (1 tablet) daily rest of the week. Come back in 2 week(s) for your next finger stick/INR blood test.        Avoid any over the counter items containing aspirin or ibuprofen, and avoid great swings in general diet. Avoid alcohol consumption. Please notify the INR nurse if you are started on any new medication including over the counter or herbal supplements. Also, please notify your INR nurse if any of your other prescription or over the counter medications have been discontinued. Call [facility] at [phone] if you have any signs of abnormal bleeding/blood clot.  ------------------------------------------------------------------------------------------------------------------  Taking Warfarin Safely: Care Instructions    Your Care Instructions  Warfarin is a medicine that you take to prevent blood clots. It is often called a blood thinner. Doctors give warfarin (such as Coumadin) to reduce the risk of blood clots. You may be at risk for blood clots if you have atrial fibrillation or deep vein thrombosis. Some other health problems may also put you at risk. Warfarin slows the amount of time it takes for your blood to clot. It can cause bleeding problems. Even if you've been taking warfarin for a while, it's important to know how to take it safely. Foods and other medicines can affect the way warfarin works. Some can make warfarin work too well. This can cause bleeding problems. And some can make it work poorly, so that it does not prevent blood clots very well. You will need regular blood tests to check how long it takes for your blood to form a clot. This test is called a PT or prothrombin time test. The result of the test is called an INR level.  Depending on the test results, your doctor or anticoagulation clinic may adjust your dose of warfarin. Follow-up care is a key part of your treatment and safety. Be sure to make and go to all appointments, and call your doctor if you are having problems. It's also a good idea to know your test results and keep a list of the medicines you take. How can you care for yourself at home? Take warfarin safely  Take your warfarin at the same time each day. If you miss a dose of warfarin, don't take an extra dose to make up for it. Your doctor can tell you exactly what to do so you don't take too much or too little. Wear medical alert jewelry that lets others know that you take warfarin. You can buy this at most drugstores. Don't take warfarin if you are pregnant or planning to get pregnant. Talk to your doctor about how you can prevent getting pregnant while you are taking it. Don't change your dose or stop taking warfarin unless your doctor tells you to. Effects of medicines and food on warfarin  Don't start or stop taking any medicines, vitamins, or natural remedies unless you first talk to your doctor. Many medicines can affect how warfarin works. These include aspirin and other pain relievers, over-the-counter medicines, multivitamins, dietary supplements, and herbal products. Tell all of your doctors and pharmacists that you take warfarin. Some prescription medicines can affect how warfarin works. Keep the amount of vitamin K in your diet about the same from day to day. Do not suddenly eat a lot more or a lot less food that is rich in vitamin K than you usually do. Vitamin K affects how warfarin works and how your blood clots. Talk with your doctor before making big changes in your diet. Vitamin K is in many foods, such as:  Leafy greens, such as kale, cabbage, spinach, Swiss chard, and lettuce. Canola and soybean oils. Green vegetables, such as asparagus, broccoli, and [place] sprouts.   Vegetable drinks, green tea leaves, and some dietary supplement drinks. Avoid cranberry juice and other cranberry products. They can increase the effects of warfarin. Limit your use of alcohol. Avoid bleeding by preventing falls and injuries  Wear slippers or shoes with nonskid soles. Remove throw rugs and clutter. Rearrange furniture and electrical cords to keep them out of walking paths. Keep stairways, porches, and outside walkways well lit. Use night-lights in hallways and bathrooms. Be extra careful when you work with sharp tools or knives. When should you call for help? Call 911 anytime you think you may need emergency care. For example, call if:  You have a sudden, severe headache that is different from past headaches. Call your doctor now or seek immediate medical care if:  You have any abnormal bleeding, such as:  Nosebleeds. Vaginal bleeding that is different (heavier, more frequent, at a different time of the month) than what you are used to. Bloody or black stools, or rectal bleeding. Bloody or pink urine. Watch closely for changes in your health, and be sure to contact your doctor if you have any problems. Where can you learn more? Go to http://www.gray.com/. Enter C000 in the search box to learn more about \"Taking Warfarin Safely: Care Instructions. \"  Current as of: January 27, 2016  Content Version: 11.1  © 1011-2740 [employer], [employer]. Care instructions adapted under license by [employer] (which disclaims liability or warranty for this information). If you have questions about a medical condition or this instruction, always ask your healthcare professional. [place] [address] any warranty or liability for your use of this information.

## 2022-09-22 NOTE — PROGRESS NOTES
Pharmacy Progress Note - Anticoagulation Management    S/O:  Mr. Rigoberto Crain  is a 67 y.o. male seen today for anticoagulation management for the diagnosis of Atrial Fibrillation. HPI: At last visit (9/8), INR was 2.1 and therapeutic. Patient denied changes to his medications and reported consistent intake of vitamin K foods. Patient will continue current regimen and recheck INR in 2 week(s). Interim History:    Warfarin start date: Prior to 3/6/20 per chart review. INR Goal:  2.0-3.0    Current warfarin regimen:  7.5 mg Sat; 5 mg daily ROW   Warfarin tablet strength:   5 mg   Duration of therapy: Indefinite    Today's pertinent positives includes:  No significant changes since last visit      Results for orders placed or performed in visit on 09/22/22   POC PROTHROMBIN W/INR   Result Value Ref Range    VALID INTERNAL CONTROL POC Yes     Prothrombin time (POC) 37.2 seconds    INR POC 3.1        Adherence:   Able to recall regimen? YES  Miss/extra dose? NO  Need refill? NO      Upcoming procedure(s):  N/A      Past Medical History:   Diagnosis Date    Atrial fibrillation (HCC)     CAD (coronary artery disease)     Chronic systolic HF (heart failure) (HCC)     Congestive heart failure (HCC)     DM type 2 (diabetes mellitus, type 2) (HCC)     Gout     HTN (hypertension)     Hypercholesterolemia     ICD (implantable cardioverter-defibrillator) in place     Long term current use of anticoagulant therapy     ASA & Warfarin    Nonischemic cardiomyopathy ([facility] [address] 2/8/2019    S/P cardiac cath 2/8/2019 2/8/19 cardiac cath with nonobstructive disease    Stroke [facility])     tia IN 2019    Syncope      No Known Allergies  Current Outpatient Medications   Medication Sig    dapagliflozin (Farxiga) 5 mg tab tablet Take 5 mg by mouth in the morning.     acetaminophen (TYLENOL) 500 mg tablet Take 1,000 mg by mouth daily as needed for Pain.    warfarin (COUMADIN) 5 mg tablet Take 1.5 tablets (7.5 mg) by mouth every Friday and Saturday. Take 1 tablet (5 mg) every Sunday, Monday, Tuesday, Wednesday and Thursday. Or take as directed by the clinic.    sacubitriL-valsartan (Entresto) 49-51 mg tab tablet Take 1 Tablet by mouth two (2) times a day. metFORMIN ER (GLUCOPHAGE XR) 500 mg tablet Take  by mouth. 3 tabs at night    atorvastatin (LIPITOR) 10 mg tablet TAKE 1 TABLET EVERY DAY    ACCU-CHEK DAVE PLUS METER misc USE TO TEST BLOOD SUGAR    multivitamin (ONE A DAY) tablet Take 1 Tab by mouth daily. aspirin delayed-release 81 mg tablet Take 81 mg by mouth daily. carvedilol (COREG) 25 mg tablet Take 25 mg by mouth two (2) times daily (with meals). potassium chloride (KLOR-CON M10) 10 mEq tablet Take 10 mEq by mouth two (2) times a day. furosemide (LASIX) 20 mg tablet Take 20 mg by mouth daily. amLODIPine (NORVASC) 5 mg tablet Take 5 mg by mouth daily. No current facility-administered medications for this visit.      Wt Readings from Last 3 Encounters:   05/12/22 218 lb (98.9 kg)   04/12/22 218 lb 9.6 oz (99.2 kg)   04/05/22 217 lb (98.4 kg)     BP Readings from Last 3 Encounters:   05/12/22 113/84   04/12/22 120/70   04/05/22 101/66     Pulse Readings from Last 3 Encounters:   05/12/22 69   04/12/22 70   04/05/22 75     Lab Results   Component Value Date/Time    WBC 4.7 04/05/2022 11:07 AM    HGB 16.7 04/05/2022 11:07 AM    HCT 49.0 04/05/2022 11:07 AM    PLATELET 536 (L) 92/25/9442 11:07 AM    MCV 84.5 04/05/2022 11:07 AM     Lab Results   Component Value Date/Time    Sodium 141 04/05/2022 11:07 AM    Potassium 3.8 04/05/2022 11:07 AM    Chloride 111 (H) 04/05/2022 11:07 AM    CO2 23 04/05/2022 11:07 AM    Anion gap 7 04/05/2022 11:07 AM    Glucose 168 (H) 04/05/2022 11:07 AM    BUN 9 04/05/2022 11:07 AM    Creatinine 1.06 04/05/2022 11:07 AM    BUN/Creatinine ratio 8 (L) 04/05/2022 11:07 AM    GFR est AA >60 04/05/2022 11:07 AM    GFR est non-AA >60 04/05/2022 11:07 AM    Calcium 8.9 04/05/2022 11:07 AM    Bilirubin, total 0.4 01/19/2022 03:35 PM    Alk. phosphatase 94 01/19/2022 03:35 PM    Protein, total 7.1 01/19/2022 03:35 PM    Albumin 3.5 01/19/2022 03:35 PM    Globulin 3.6 01/19/2022 03:35 PM    A-G Ratio 1.0 (L) 01/19/2022 03:35 PM    ALT (SGPT) 32 01/19/2022 03:35 PM     CrCl cannot be calculated (Unknown ideal weight.). INR History:   (normal INR range 0.8-1.2)     Date   INR   PT   Dose/Comments  09/22/22 3.1  37.2 7.5 mg Sat; 5 mg daily ROW  09/08/22 2.1  25.1 7.5 mg Sat; 5 mg daily ROW   08/31/22 1.2  14.2 5 mg 8/25 then hold for 5 days (8/26-8/30)  08/25/22 2.5  30.0 hold doses 8/16, 8/17 then 7.5 mg Sat; 5 mg daily ROW   08/16/22 4.2  50.0 7.5 mg Fri, Sat; 5 mg daily ROW  07/25/22 3.4  41.2 7.5 mg Fri, Sat; 5 mg daily ROW  07/05/22 3.0  35.5 hold dose 6/20 then resume 7.5 mg Fri, Sat; 5 mg daily ROW  06/20/22 4.0  48.0 7.5 mg Fri, Sat; 5 mg daily ROW  05/12/22 2.7  32.7 7.5 mg Fri, Sat; 5 mg daily ROW  04/15/22 2.3  27.3 7.5 mg Fri, Sat; 5 mg daily ROW    Medications that can increase  INR: multivitamin, glimepiride  Medications that can decrease INR: metformin    A/P:       Anticoagulation:  Considering Mr. Juan Carlos Miranda past history, todays findings, and per Anticoagulation Collaborative Practice Agreement/Protocol:    Fingerstick POC INR (3.1) is Supratherapeutic for INR goal today. Continue warfarin 7.5 mg Sat; 5 mg daily ROW   INR is 3.1 and supratherapeutic. Patient denies extra doses of warfarin or changes to his medications. Patient reports consistent intake of vitamin K foods. Patient has been previously therapeutic on current regimen and will continue 7.5 mg Sat, 5 mg daily ROW. Patient will recheck INR in 2 week(s). Patient was instructed to schedule an appointment in 2 week(s) prior to leaving the clinic. Medication reconciliation was completed during the visit. There are no discontinued medications. A full discussion of the nature of anticoagulants has been carried out.   A full discussion of the need for frequent and regular monitoring, precise dosage adjustment and compliance was stressed. Side effects of potential bleeding were discussed and Mr. Rivero was instructed to call [phone] if there are any signs of abnormal bleeding. Mr. Anne-Marie Rubio was instructed to avoid any OTC items containing aspirin or ibuprofen and prior to starting any new OTC products to consult with his physician or pharmacist to ensure no drug interactions are present. Mr. Anne-Marie Rubio was instructed to avoid any major changes in his general diet and to avoid alcohol consumption. Mr. Anne-Marie Rubio was provided information in the AVS that includes topics on understanding coumadin therapy, drug interaction considerations, vitamin K and coumadin use, interactions with foods and supplements containing vitamin K, and the use of herbal products. Mr. Anne-Marie Rubio verbalized his understanding of all instructions and will call the office with any questions, concerns, or signs of abnormal bleeding or blood clot. Notifications of recommendations will be sent to Dr. Nickie Adams for review.     Thank you,   Dai Mckeon, [address] [facility] Tracking Only    Intervention Detail: Adherence Monitorin and Lab(s) Ordered  Total # of Interventions Recommended: 2  Total # of Interventions Accepted: 2  Time Spent (min): 20

## 2022-10-05 ENCOUNTER — ANTI-COAG VISIT (OUTPATIENT)
Dept: PHARMACY | Age: 72
End: 2022-10-05
Payer: MEDICARE

## 2022-10-05 DIAGNOSIS — Z79.01 LONG TERM (CURRENT) USE OF ANTICOAGULANTS: ICD-10-CM

## 2022-10-05 DIAGNOSIS — I48.0 PAF (PAROXYSMAL ATRIAL FIBRILLATION) (HCC): Primary | ICD-10-CM

## 2022-10-05 LAB
INR BLD: 2.7
PT POC: 32.5 SECONDS
VALID INTERNAL CONTROL?: YES

## 2022-10-05 PROCEDURE — 85610 PROTHROMBIN TIME: CPT

## 2022-10-05 PROCEDURE — 99211 OFF/OP EST MAY X REQ PHY/QHP: CPT

## 2022-10-05 NOTE — PATIENT INSTRUCTIONS
Today your INR was 2.7 . Your goal INR is  2.0-3.0 . You have a 5 mg tablet of Coumadin (warfarin). Take Coumadin (warfarin) as follows: Take 7.5 mg (1.5 tablets) every Saturday; and 5 mg (1 tablet) daily rest of the week. Come back in 4 week(s) for your next finger stick/INR blood test.        Avoid any over the counter items containing aspirin or ibuprofen, and avoid great swings in general diet. Avoid alcohol consumption. Please notify the INR nurse if you are started on any new medication including over the counter or herbal supplements. Also, please notify your INR nurse if any of your other prescription or over the counter medications have been discontinued. Call [facility] at [phone] if you have any signs of abnormal bleeding/blood clot.  ------------------------------------------------------------------------------------------------------------------  Taking Warfarin Safely: Care Instructions    Your Care Instructions  Warfarin is a medicine that you take to prevent blood clots. It is often called a blood thinner. Doctors give warfarin (such as Coumadin) to reduce the risk of blood clots. You may be at risk for blood clots if you have atrial fibrillation or deep vein thrombosis. Some other health problems may also put you at risk. Warfarin slows the amount of time it takes for your blood to clot. It can cause bleeding problems. Even if you've been taking warfarin for a while, it's important to know how to take it safely. Foods and other medicines can affect the way warfarin works. Some can make warfarin work too well. This can cause bleeding problems. And some can make it work poorly, so that it does not prevent blood clots very well. You will need regular blood tests to check how long it takes for your blood to form a clot. This test is called a PT or prothrombin time test. The result of the test is called an INR level.  Depending on the test results, your doctor or anticoagulation clinic may adjust your dose of warfarin. Follow-up care is a key part of your treatment and safety. Be sure to make and go to all appointments, and call your doctor if you are having problems. It's also a good idea to know your test results and keep a list of the medicines you take. How can you care for yourself at home? Take warfarin safely  Take your warfarin at the same time each day. If you miss a dose of warfarin, don't take an extra dose to make up for it. Your doctor can tell you exactly what to do so you don't take too much or too little. Wear medical alert jewelry that lets others know that you take warfarin. You can buy this at most drugstores. Don't take warfarin if you are pregnant or planning to get pregnant. Talk to your doctor about how you can prevent getting pregnant while you are taking it. Don't change your dose or stop taking warfarin unless your doctor tells you to. Effects of medicines and food on warfarin  Don't start or stop taking any medicines, vitamins, or natural remedies unless you first talk to your doctor. Many medicines can affect how warfarin works. These include aspirin and other pain relievers, over-the-counter medicines, multivitamins, dietary supplements, and herbal products. Tell all of your doctors and pharmacists that you take warfarin. Some prescription medicines can affect how warfarin works. Keep the amount of vitamin K in your diet about the same from day to day. Do not suddenly eat a lot more or a lot less food that is rich in vitamin K than you usually do. Vitamin K affects how warfarin works and how your blood clots. Talk with your doctor before making big changes in your diet. Vitamin K is in many foods, such as:  Leafy greens, such as kale, cabbage, spinach, Swiss chard, and lettuce. Canola and soybean oils. Green vegetables, such as asparagus, broccoli, and [place] sprouts.   Vegetable drinks, green tea leaves, and some dietary supplement drinks. Avoid cranberry juice and other cranberry products. They can increase the effects of warfarin. Limit your use of alcohol. Avoid bleeding by preventing falls and injuries  Wear slippers or shoes with nonskid soles. Remove throw rugs and clutter. Rearrange furniture and electrical cords to keep them out of walking paths. Keep stairways, porches, and outside walkways well lit. Use night-lights in hallways and bathrooms. Be extra careful when you work with sharp tools or knives. When should you call for help? Call 911 anytime you think you may need emergency care. For example, call if:  You have a sudden, severe headache that is different from past headaches. Call your doctor now or seek immediate medical care if:  You have any abnormal bleeding, such as:  Nosebleeds. Vaginal bleeding that is different (heavier, more frequent, at a different time of the month) than what you are used to. Bloody or black stools, or rectal bleeding. Bloody or pink urine. Watch closely for changes in your health, and be sure to contact your doctor if you have any problems. Where can you learn more? Go to http://manuelito-mc.info/. Enter N495 in the search box to learn more about \"Taking Warfarin Safely: Care Instructions. \"  Current as of: January 27, 2016  Content Version: 11.1  © 9161-2891 [employer]. Care instructions adapted under license by [employer] (which disclaims liability or warranty for this information). If you have questions about a medical condition or this instruction, always ask your healthcare professional. [place] [address] any warranty or liability for your use of this information.

## 2022-10-05 NOTE — PROGRESS NOTES
Pharmacy Progress Note - Anticoagulation Management    S/O:  Mr. Toi Cesar  is a 67 y.o. male seen today for anticoagulation management for the diagnosis of Atrial Fibrillation. HPI: At last visit (9/22), INR was 3.1 and supratherapeutic. Patient denied extra doses of warfarin or changes to his medications. Patient reported consistent intake of vitamin K foods. Patient has been previously therapeutic on current regimen and will continue 7.5 mg Sat, 5 mg daily ROW. Patient will recheck INR in 2 week(s). Interim History:    Warfarin start date: Prior to 3/6/20 per chart review. INR Goal:  2.0-3.0    Current warfarin regimen:  7.5 mg Sat; 5 mg daily ROW   Warfarin tablet strength:   5 mg   Duration of therapy: Indefinite    Today's pertinent positives includes:  No significant changes since last visit      Results for orders placed or performed in visit on 10/05/22   POC PROTHROMBIN W/INR   Result Value Ref Range    VALID INTERNAL CONTROL POC Yes     Prothrombin time (POC) 32.5 seconds    INR POC 2.7        Adherence:   Able to recall regimen? YES  Miss/extra dose? NO  Need refill? NO      Upcoming procedure(s):  N/A      Past Medical History:   Diagnosis Date    Atrial fibrillation (HCC)     CAD (coronary artery disease)     Chronic systolic HF (heart failure) (HCC)     Congestive heart failure (HCC)     DM type 2 (diabetes mellitus, type 2) (HCC)     Gout     HTN (hypertension)     Hypercholesterolemia     ICD (implantable cardioverter-defibrillator) in place     Long term current use of anticoagulant therapy     ASA & Warfarin    Nonischemic cardiomyopathy ([facility] [address] 2/8/2019    S/P cardiac cath 2/8/2019 2/8/19 cardiac cath with nonobstructive disease    Stroke [facility])     tia IN 2019    Syncope      No Known Allergies  Current Outpatient Medications   Medication Sig    dapagliflozin (Farxiga) 5 mg tab tablet Take 5 mg by mouth in the morning.     acetaminophen (TYLENOL) 500 mg tablet Take 1,000 mg by mouth daily as needed for Pain.    warfarin (COUMADIN) 5 mg tablet Take 1.5 tablets (7.5 mg) by mouth every Friday and Saturday. Take 1 tablet (5 mg) every Sunday, Monday, Tuesday, Wednesday and Thursday. Or take as directed by the clinic.    sacubitriL-valsartan (Entresto) 49-51 mg tab tablet Take 1 Tablet by mouth two (2) times a day. metFORMIN ER (GLUCOPHAGE XR) 500 mg tablet Take  by mouth. 3 tabs at night    atorvastatin (LIPITOR) 10 mg tablet TAKE 1 TABLET EVERY DAY    ACCU-CHEK DAVE PLUS METER misc USE TO TEST BLOOD SUGAR    multivitamin (ONE A DAY) tablet Take 1 Tab by mouth daily. aspirin delayed-release 81 mg tablet Take 81 mg by mouth daily. carvedilol (COREG) 25 mg tablet Take 25 mg by mouth two (2) times daily (with meals). potassium chloride (KLOR-CON M10) 10 mEq tablet Take 10 mEq by mouth two (2) times a day. furosemide (LASIX) 20 mg tablet Take 20 mg by mouth daily. amLODIPine (NORVASC) 5 mg tablet Take 5 mg by mouth daily. No current facility-administered medications for this visit.      Wt Readings from Last 3 Encounters:   05/12/22 218 lb (98.9 kg)   04/12/22 218 lb 9.6 oz (99.2 kg)   04/05/22 217 lb (98.4 kg)     BP Readings from Last 3 Encounters:   05/12/22 113/84   04/12/22 120/70   04/05/22 101/66     Pulse Readings from Last 3 Encounters:   05/12/22 69   04/12/22 70   04/05/22 75     Lab Results   Component Value Date/Time    WBC 4.7 04/05/2022 11:07 AM    HGB 16.7 04/05/2022 11:07 AM    HCT 49.0 04/05/2022 11:07 AM    PLATELET 358 (L) 05/24/4047 11:07 AM    MCV 84.5 04/05/2022 11:07 AM     Lab Results   Component Value Date/Time    Sodium 141 04/05/2022 11:07 AM    Potassium 3.8 04/05/2022 11:07 AM    Chloride 111 (H) 04/05/2022 11:07 AM    CO2 23 04/05/2022 11:07 AM    Anion gap 7 04/05/2022 11:07 AM    Glucose 168 (H) 04/05/2022 11:07 AM    BUN 9 04/05/2022 11:07 AM    Creatinine 1.06 04/05/2022 11:07 AM    BUN/Creatinine ratio 8 (L) 04/05/2022 11:07 AM    GFR est AA >60 04/05/2022 11:07 AM    GFR est non-AA >60 04/05/2022 11:07 AM    Calcium 8.9 04/05/2022 11:07 AM    Bilirubin, total 0.4 01/19/2022 03:35 PM    Alk. phosphatase 94 01/19/2022 03:35 PM    Protein, total 7.1 01/19/2022 03:35 PM    Albumin 3.5 01/19/2022 03:35 PM    Globulin 3.6 01/19/2022 03:35 PM    A-G Ratio 1.0 (L) 01/19/2022 03:35 PM    ALT (SGPT) 32 01/19/2022 03:35 PM     CrCl cannot be calculated (Patient's most recent lab result is older than the maximum 180 days allowed.). INR History:   (normal INR range 0.8-1.2)     Date   INR   PT   Dose/Comments  10/05/22 2.7  32.5 7.5 mg Sat; 5 mg daily ROW  09/22/22 3.1  37.2 7.5 mg Sat; 5 mg daily ROW  09/08/22 2.1  25.1 7.5 mg Sat; 5 mg daily ROW   08/31/22 1.2  14.2 5 mg 8/25 then hold for 5 days (8/26-8/30)  08/25/22 2.5  30.0 hold doses 8/16, 8/17 then 7.5 mg Sat; 5 mg daily ROW   08/16/22 4.2  50.0 7.5 mg Fri, Sat; 5 mg daily ROW  07/25/22 3.4  41.2 7.5 mg Fri, Sat; 5 mg daily ROW  07/05/22 3.0  35.5 hold dose 6/20 then resume 7.5 mg Fri, Sat; 5 mg daily ROW  06/20/22 4.0  48.0 7.5 mg Fri, Sat; 5 mg daily ROW  05/12/22 2.7  32.7 7.5 mg Fri, Sat; 5 mg daily ROW  04/15/22 2.3  27.3 7.5 mg Fri, Sat; 5 mg daily ROW    Medications that can increase  INR: multivitamin, glimepiride  Medications that can decrease INR: metformin    A/P:       Anticoagulation:  Considering Mr. Shelly Chavira past history, todays findings, and per Anticoagulation Collaborative Practice Agreement/Protocol:    Fingerstick POC INR (2.7) is Therapeutic for INR goal today. Continue warfarin 7.5 mg Sat; 5 mg daily ROW   INR is 2.7 and therapeutic. Patient denies changes to his medications and reports consistent intake of vitamin K foods. Patient will continue current regimen and recheck INR in 4 week(s). Patient was instructed to schedule an appointment in 4 week(s) prior to leaving the clinic. Medication reconciliation was completed during the visit.     There are no discontinued medications. A full discussion of the nature of anticoagulants has been carried out. A full discussion of the need for frequent and regular monitoring, precise dosage adjustment and compliance was stressed. Side effects of potential bleeding were discussed and Mr. Rivero was instructed to call [phone] if there are any signs of abnormal bleeding. Mr. Mitra Madrigal was instructed to avoid any OTC items containing aspirin or ibuprofen and prior to starting any new OTC products to consult with his physician or pharmacist to ensure no drug interactions are present. Mr. Mitra Madrigal was instructed to avoid any major changes in his general diet and to avoid alcohol consumption. Mr. Mitra Madrigal was provided information in the AVS that includes topics on understanding coumadin therapy, drug interaction considerations, vitamin K and coumadin use, interactions with foods and supplements containing vitamin K, and the use of herbal products. Mr. Mitra Madrigal verbalized his understanding of all instructions and will call the office with any questions, concerns, or signs of abnormal bleeding or blood clot. Notifications of recommendations will be sent to Dr. Leo Woodson for review.     Thank you,   Flori Okeefe, [id number] Tracking Only    Intervention Detail: Adherence Monitorin and Lab(s) Ordered  Total # of Interventions Recommended: 2  Total # of Interventions Accepted: 2  Time Spent (min): 20

## 2022-11-02 ENCOUNTER — ANTI-COAG VISIT (OUTPATIENT)
Dept: PHARMACY | Age: 72
End: 2022-11-02
Payer: MEDICARE

## 2022-11-02 DIAGNOSIS — I48.0 PAF (PAROXYSMAL ATRIAL FIBRILLATION) (HCC): Primary | ICD-10-CM

## 2022-11-02 DIAGNOSIS — Z79.01 LONG TERM (CURRENT) USE OF ANTICOAGULANTS: ICD-10-CM

## 2022-11-02 LAB
INR BLD: 5.6
PT POC: 66.6 SECONDS
VALID INTERNAL CONTROL?: YES

## 2022-11-02 PROCEDURE — 85610 PROTHROMBIN TIME: CPT

## 2022-11-02 PROCEDURE — 99213 OFFICE O/P EST LOW 20 MIN: CPT

## 2022-11-02 RX ORDER — WARFARIN SODIUM 5 MG/1
TABLET ORAL
Qty: 110 TABLET | Refills: 1 | Status: SHIPPED | OUTPATIENT
Start: 2022-11-02

## 2022-11-02 NOTE — PROGRESS NOTES
Pharmacy Progress Note - Anticoagulation Management    S/O:  Mr. Joanne Ponce  is a 67 y.o. male seen today for anticoagulation management for the diagnosis of Atrial Fibrillation. HPI: At last visit (10/5), INR was 2.7 and therapeutic. Patient denied changes to his medications and reported consistent intake of vitamin K foods. Patient will continue current regimen and recheck INR in 4 week(s). Interim History:    Warfarin start date: Prior to 3/6/20 per chart review. INR Goal:  2.0-3.0    Current warfarin regimen:  7.5 mg Sat; 5 mg daily ROW   Warfarin tablet strength:   5 mg   Duration of therapy: Indefinite    Today's pertinent positives includes:  Change in diet/appetite    Patient reports eating less vitamin K foods recently. Results for orders placed or performed in visit on 11/02/22   POC PROTHROMBIN W/INR   Result Value Ref Range    VALID INTERNAL CONTROL POC Yes     Prothrombin time (POC) 66.6 seconds    INR POC 5.6        Adherence:   Able to recall regimen? YES  Miss/extra dose? NO  Need refill? YES      Upcoming procedure(s):  N/A      Past Medical History:   Diagnosis Date    Atrial fibrillation (HCC)     CAD (coronary artery disease)     Chronic systolic HF (heart failure) (HCC)     Congestive heart failure (HCC)     DM type 2 (diabetes mellitus, type 2) (HCC)     Gout     HTN (hypertension)     Hypercholesterolemia     ICD (implantable cardioverter-defibrillator) in place     Long term current use of anticoagulant therapy     ASA & Warfarin    Nonischemic cardiomyopathy ([facility] [address] 2/8/2019    S/P cardiac cath 2/8/2019 2/8/19 cardiac cath with nonobstructive disease    Stroke [facility])     tia IN 2019    Syncope      No Known Allergies  Current Outpatient Medications   Medication Sig    warfarin (COUMADIN) 5 mg tablet Take 1.5 tablets (7.5 mg) by mouth every Saturday. Take 1 tablet (5 mg) every Sunday, Monday, Tuesday, Wednesday, Thursday and Friday. Or take as directed by the clinic. dapagliflozin (Farxiga) 5 mg tab tablet Take 5 mg by mouth in the morning. acetaminophen (TYLENOL) 500 mg tablet Take 1,000 mg by mouth daily as needed for Pain. sacubitriL-valsartan (Entresto) 49-51 mg tab tablet Take 1 Tablet by mouth two (2) times a day. metFORMIN ER (GLUCOPHAGE XR) 500 mg tablet Take  by mouth. 3 tabs at night    atorvastatin (LIPITOR) 10 mg tablet TAKE 1 TABLET EVERY DAY    ACCU-CHEK DAVE PLUS METER misc USE TO TEST BLOOD SUGAR    multivitamin (ONE A DAY) tablet Take 1 Tab by mouth daily. aspirin delayed-release 81 mg tablet Take 81 mg by mouth daily. carvedilol (COREG) 25 mg tablet Take 25 mg by mouth two (2) times daily (with meals). potassium chloride (KLOR-CON M10) 10 mEq tablet Take 10 mEq by mouth two (2) times a day. furosemide (LASIX) 20 mg tablet Take 20 mg by mouth daily. amLODIPine (NORVASC) 5 mg tablet Take 5 mg by mouth daily. No current facility-administered medications for this visit.      Wt Readings from Last 3 Encounters:   05/12/22 218 lb (98.9 kg)   04/12/22 218 lb 9.6 oz (99.2 kg)   04/05/22 217 lb (98.4 kg)     BP Readings from Last 3 Encounters:   05/12/22 113/84   04/12/22 120/70   04/05/22 101/66     Pulse Readings from Last 3 Encounters:   05/12/22 69   04/12/22 70   04/05/22 75     Lab Results   Component Value Date/Time    WBC 4.7 04/05/2022 11:07 AM    HGB 16.7 04/05/2022 11:07 AM    HCT 49.0 04/05/2022 11:07 AM    PLATELET 487 (L) 89/19/1370 11:07 AM    MCV 84.5 04/05/2022 11:07 AM     Lab Results   Component Value Date/Time    Sodium 141 04/05/2022 11:07 AM    Potassium 3.8 04/05/2022 11:07 AM    Chloride 111 (H) 04/05/2022 11:07 AM    CO2 23 04/05/2022 11:07 AM    Anion gap 7 04/05/2022 11:07 AM    Glucose 168 (H) 04/05/2022 11:07 AM    BUN 9 04/05/2022 11:07 AM    Creatinine 1.06 04/05/2022 11:07 AM    BUN/Creatinine ratio 8 (L) 04/05/2022 11:07 AM    GFR est AA >60 04/05/2022 11:07 AM    GFR est non-AA >60 04/05/2022 11:07 AM Calcium 8.9 04/05/2022 11:07 AM    Bilirubin, total 0.4 01/19/2022 03:35 PM    Alk. phosphatase 94 01/19/2022 03:35 PM    Protein, total 7.1 01/19/2022 03:35 PM    Albumin 3.5 01/19/2022 03:35 PM    Globulin 3.6 01/19/2022 03:35 PM    A-G Ratio 1.0 (L) 01/19/2022 03:35 PM    ALT (SGPT) 32 01/19/2022 03:35 PM     CrCl cannot be calculated (Patient's most recent lab result is older than the maximum 180 days allowed.). INR History:   (normal INR range 0.8-1.2)     Date   INR   PT   Dose/Comments  11/02/22 5.6  66.6 7.5 mg Sat; 5 mg daily ROW  10/05/22 2.7  32.5 7.5 mg Sat; 5 mg daily ROW  09/22/22 3.1  37.2 7.5 mg Sat; 5 mg daily ROW  09/08/22 2.1  25.1 7.5 mg Sat; 5 mg daily ROW   08/31/22 1.2  14.2 5 mg 8/25 then hold for 5 days (8/26-8/30)  08/25/22 2.5  30.0 hold doses 8/16, 8/17 then 7.5 mg Sat; 5 mg daily ROW   08/16/22 4.2  50.0 7.5 mg Fri, Sat; 5 mg daily ROW  07/25/22 3.4  41.2 7.5 mg Fri, Sat; 5 mg daily ROW  07/05/22 3.0  35.5 hold dose 6/20 then resume 7.5 mg Fri, Sat; 5 mg daily ROW  06/20/22 4.0  48.0 7.5 mg Fri, Sat; 5 mg daily ROW  05/12/22 2.7  32.7 7.5 mg Fri, Sat; 5 mg daily ROW  04/15/22 2.3  27.3 7.5 mg Fri, Sat; 5 mg daily ROW    Medications that can increase  INR: multivitamin, glimepiride  Medications that can decrease INR: metformin    A/P:       Anticoagulation:  Considering Mr. Serg Bhakta past history, todays findings, and per Anticoagulation Collaborative Practice Agreement/Protocol:    Fingerstick POC INR (5.6) is Supratherapeutic for INR goal today. Change warfarin to hold doses today (11/2) and tomorrow (11/3) and then resume 7.5 mg Sat; 5 mg daily ROW   INR is 5.6 and supratherapeutic. Patient denies extra doses of warfarin or changes to his medications. Patient reports eating less vitamin K foods recently. Patient has been previously therapeutic on current regimen and will hold doses today (11/2) and tomorrow (11/3) and then resume 7.5 mg Sat; 5 mg daily ROW.  Encouraged patient to eat an additional serving of food with vitamin K today. Patient states he will resume his usual diet with vitamin K foods. Patient scheduled next INR appointment for 22. Patient was instructed to schedule an appointment in 2 week(s) prior to leaving the clinic. Medication reconciliation was completed during the visit. Medications Discontinued During This Encounter   Medication Reason    warfarin (COUMADIN) 5 mg tablet REORDER       A full discussion of the nature of anticoagulants has been carried out. A full discussion of the need for frequent and regular monitoring, precise dosage adjustment and compliance was stressed. Side effects of potential bleeding were discussed and Mr. Rivero was instructed to call [phone] if there are any signs of abnormal bleeding. Mr. Noni Chambers was instructed to avoid any OTC items containing aspirin or ibuprofen and prior to starting any new OTC products to consult with his physician or pharmacist to ensure no drug interactions are present. Mr. Noni Chambers was instructed to avoid any major changes in his general diet and to avoid alcohol consumption. Mr. Noni Chambers was provided information in the AVS that includes topics on understanding coumadin therapy, drug interaction considerations, vitamin K and coumadin use, interactions with foods and supplements containing vitamin K, and the use of herbal products. Mr. Noni Chambers verbalized his understanding of all instructions and will call the office with any questions, concerns, or signs of abnormal bleeding or blood clot. Notifications of recommendations will be sent to Dr. Lexie Ibrahim for review.     Thank you,   John Blevins, [id number] Tracking Only    Intervention Detail: Adherence Monitorin, Dose Adjustment: 1, reason: Therapy Optimization, Lab(s) Ordered, and Refill(s) Provided   Total # of Interventions Recommended: 4  Total # of Interventions Accepted: 4  Time Spent (min): 20

## 2022-11-02 NOTE — PATIENT INSTRUCTIONS
Today your INR was 5.6 . Your goal INR is  2.0-3.0 . You have a 5 mg tablet of Coumadin (warfarin). Take Coumadin (warfarin) as follows:    Hold doses today (11/2/22) and tomorrow (11/3/22) and then resume 7.5 mg (1.5 tablets) every Saturday; and 5 mg (1 tablet) daily rest of the week. Come back in 2 week(s) for your next finger stick/INR blood test.        Avoid any over the counter items containing aspirin or ibuprofen, and avoid great swings in general diet. Avoid alcohol consumption. Please notify the INR nurse if you are started on any new medication including over the counter or herbal supplements. Also, please notify your INR nurse if any of your other prescription or over the counter medications have been discontinued. Call [facility] at [phone] if you have any signs of abnormal bleeding/blood clot.  ------------------------------------------------------------------------------------------------------------------  Taking Warfarin Safely: Care Instructions    Your Care Instructions  Warfarin is a medicine that you take to prevent blood clots. It is often called a blood thinner. Doctors give warfarin (such as Coumadin) to reduce the risk of blood clots. You may be at risk for blood clots if you have atrial fibrillation or deep vein thrombosis. Some other health problems may also put you at risk. Warfarin slows the amount of time it takes for your blood to clot. It can cause bleeding problems. Even if you've been taking warfarin for a while, it's important to know how to take it safely. Foods and other medicines can affect the way warfarin works. Some can make warfarin work too well. This can cause bleeding problems. And some can make it work poorly, so that it does not prevent blood clots very well. You will need regular blood tests to check how long it takes for your blood to form a clot.  This test is called a PT or prothrombin time test. The result of the test is called an INR level. Depending on the test results, your doctor or anticoagulation clinic may adjust your dose of warfarin. Follow-up care is a key part of your treatment and safety. Be sure to make and go to all appointments, and call your doctor if you are having problems. It's also a good idea to know your test results and keep a list of the medicines you take. How can you care for yourself at home? Take warfarin safely  Take your warfarin at the same time each day. If you miss a dose of warfarin, don't take an extra dose to make up for it. Your doctor can tell you exactly what to do so you don't take too much or too little. Wear medical alert jewelry that lets others know that you take warfarin. You can buy this at most drugstores. Don't take warfarin if you are pregnant or planning to get pregnant. Talk to your doctor about how you can prevent getting pregnant while you are taking it. Don't change your dose or stop taking warfarin unless your doctor tells you to. Effects of medicines and food on warfarin  Don't start or stop taking any medicines, vitamins, or natural remedies unless you first talk to your doctor. Many medicines can affect how warfarin works. These include aspirin and other pain relievers, over-the-counter medicines, multivitamins, dietary supplements, and herbal products. Tell all of your doctors and pharmacists that you take warfarin. Some prescription medicines can affect how warfarin works. Keep the amount of vitamin K in your diet about the same from day to day. Do not suddenly eat a lot more or a lot less food that is rich in vitamin K than you usually do. Vitamin K affects how warfarin works and how your blood clots. Talk with your doctor before making big changes in your diet. Vitamin K is in many foods, such as:  Leafy greens, such as kale, cabbage, spinach, Swiss chard, and lettuce. Canola and soybean oils.   Green vegetables, such as asparagus, broccoli, and [address] sprouts. Vegetable drinks, green tea leaves, and some dietary supplement drinks. Avoid cranberry juice and other cranberry products. They can increase the effects of warfarin. Limit your use of alcohol. Avoid bleeding by preventing falls and injuries  Wear slippers or shoes with nonskid soles. Remove throw rugs and clutter. Rearrange furniture and electrical cords to keep them out of walking paths. Keep stairways, porches, and outside walkways well lit. Use night-lights in hallways and bathrooms. Be extra careful when you work with sharp tools or knives. When should you call for help? Call 911 anytime you think you may need emergency care. For example, call if:  You have a sudden, severe headache that is different from past headaches. Call your doctor now or seek immediate medical care if:  You have any abnormal bleeding, such as:  Nosebleeds. Vaginal bleeding that is different (heavier, more frequent, at a different time of the month) than what you are used to. Bloody or black stools, or rectal bleeding. Bloody or pink urine. Watch closely for changes in your health, and be sure to contact your doctor if you have any problems. Where can you learn more? Go to http://www.gray.com/. Enter J431 in the search box to learn more about \"Taking Warfarin Safely: Care Instructions. \"  Current as of: January 27, 2016  Content Version: 11.1  © 1647-6352 [employer]. Care instructions adapted under license by [employer] (which disclaims liability or warranty for this information). If you have questions about a medical condition or this instruction, always ask your healthcare professional. [place] [address] any warranty or liability for your use of this information.

## 2022-11-16 ENCOUNTER — ANTI-COAG VISIT (OUTPATIENT)
Dept: PHARMACY | Age: 72
End: 2022-11-16
Payer: MEDICARE

## 2022-11-16 DIAGNOSIS — Z79.01 LONG TERM (CURRENT) USE OF ANTICOAGULANTS: ICD-10-CM

## 2022-11-16 DIAGNOSIS — I48.0 PAF (PAROXYSMAL ATRIAL FIBRILLATION) (HCC): Primary | ICD-10-CM

## 2022-11-16 LAB
INR BLD: 4.4
PT POC: 52.4 SECONDS
VALID INTERNAL CONTROL?: YES

## 2022-11-16 PROCEDURE — 99212 OFFICE O/P EST SF 10 MIN: CPT

## 2022-11-16 PROCEDURE — 85610 PROTHROMBIN TIME: CPT

## 2022-11-16 NOTE — PATIENT INSTRUCTIONS
Today your INR was 4.4 . Your goal INR is  2.0-3.0 . You have a 5 mg tablet of Coumadin (warfarin). Take Coumadin (warfarin) as follows:    Hold doses today (11/16/22) and tomorrow (11/17/22) and then resume 7.5 mg (1.5 tablets) every Saturday; and 5 mg (1 tablet) daily rest of the week. Come back in 2 week(s) for your next finger stick/INR blood test.        Avoid any over the counter items containing aspirin or ibuprofen, and avoid great swings in general diet. Avoid alcohol consumption. Please notify the INR nurse if you are started on any new medication including over the counter or herbal supplements. Also, please notify your INR nurse if any of your other prescription or over the counter medications have been discontinued. Call [facility] at [phone] if you have any signs of abnormal bleeding/blood clot.  ------------------------------------------------------------------------------------------------------------------  Taking Warfarin Safely: Care Instructions    Your Care Instructions  Warfarin is a medicine that you take to prevent blood clots. It is often called a blood thinner. Doctors give warfarin (such as Coumadin) to reduce the risk of blood clots. You may be at risk for blood clots if you have atrial fibrillation or deep vein thrombosis. Some other health problems may also put you at risk. Warfarin slows the amount of time it takes for your blood to clot. It can cause bleeding problems. Even if you've been taking warfarin for a while, it's important to know how to take it safely. Foods and other medicines can affect the way warfarin works. Some can make warfarin work too well. This can cause bleeding problems. And some can make it work poorly, so that it does not prevent blood clots very well. You will need regular blood tests to check how long it takes for your blood to form a clot.  This test is called a PT or prothrombin time test. The result of the test is called an INR level. Depending on the test results, your doctor or anticoagulation clinic may adjust your dose of warfarin. Follow-up care is a key part of your treatment and safety. Be sure to make and go to all appointments, and call your doctor if you are having problems. It's also a good idea to know your test results and keep a list of the medicines you take. How can you care for yourself at home? Take warfarin safely  Take your warfarin at the same time each day. If you miss a dose of warfarin, don't take an extra dose to make up for it. Your doctor can tell you exactly what to do so you don't take too much or too little. Wear medical alert jewelry that lets others know that you take warfarin. You can buy this at most drugstores. Don't take warfarin if you are pregnant or planning to get pregnant. Talk to your doctor about how you can prevent getting pregnant while you are taking it. Don't change your dose or stop taking warfarin unless your doctor tells you to. Effects of medicines and food on warfarin  Don't start or stop taking any medicines, vitamins, or natural remedies unless you first talk to your doctor. Many medicines can affect how warfarin works. These include aspirin and other pain relievers, over-the-counter medicines, multivitamins, dietary supplements, and herbal products. Tell all of your doctors and pharmacists that you take warfarin. Some prescription medicines can affect how warfarin works. Keep the amount of vitamin K in your diet about the same from day to day. Do not suddenly eat a lot more or a lot less food that is rich in vitamin K than you usually do. Vitamin K affects how warfarin works and how your blood clots. Talk with your doctor before making big changes in your diet. Vitamin K is in many foods, such as:  Leafy greens, such as kale, cabbage, spinach, Swiss chard, and lettuce. Canola and soybean oils.   Green vegetables, such as asparagus, broccoli, and [address] sprouts. Vegetable drinks, green tea leaves, and some dietary supplement drinks. Avoid cranberry juice and other cranberry products. They can increase the effects of warfarin. Limit your use of alcohol. Avoid bleeding by preventing falls and injuries  Wear slippers or shoes with nonskid soles. Remove throw rugs and clutter. Rearrange furniture and electrical cords to keep them out of walking paths. Keep stairways, porches, and outside walkways well lit. Use night-lights in hallways and bathrooms. Be extra careful when you work with sharp tools or knives. When should you call for help? Call 911 anytime you think you may need emergency care. For example, call if:  You have a sudden, severe headache that is different from past headaches. Call your doctor now or seek immediate medical care if:  You have any abnormal bleeding, such as:  Nosebleeds. Vaginal bleeding that is different (heavier, more frequent, at a different time of the month) than what you are used to. Bloody or black stools, or rectal bleeding. Bloody or pink urine. Watch closely for changes in your health, and be sure to contact your doctor if you have any problems. Where can you learn more? Go to http://www.gray.com/. Enter V412 in the search box to learn more about \"Taking Warfarin Safely: Care Instructions. \"  Current as of: January 27, 2016  Content Version: 11.1  © 4779-6267 [employer]. Care instructions adapted under license by [employer] (which disclaims liability or warranty for this information). If you have questions about a medical condition or this instruction, always ask your healthcare professional. [place] [address] any warranty or liability for your use of this information.

## 2022-11-16 NOTE — PROGRESS NOTES
Pharmacy Progress Note - Anticoagulation Management    S/O:  Mr. Clifton Blackburn  is a 67 y.o. male seen today for anticoagulation management for the diagnosis of Atrial Fibrillation. HPI: At last visit (11/2), INR was 5.6 and supratherapeutic. Patient denied extra doses of warfarin or changes to his medications. Patient reported eating less vitamin K foods recently. Patient has been previously therapeutic on current regimen and will hold doses today (11/2) and tomorrow (11/3) and then resume 7.5 mg Sat; 5 mg daily ROW. Encouraged patient to eat an additional serving of food with vitamin K today. Patient stated he will resume his usual diet with vitamin K foods. Patient scheduled next INR appointment for 11/16/22. Interim History:    Warfarin start date: Prior to 3/6/20 per chart review. INR Goal:  2.0-3.0    Current warfarin regimen:  hold doses today (11/2) and tomorrow (11/3) and then resume 7.5 mg Sat; 5 mg daily ROW   Warfarin tablet strength:   5 mg   Duration of therapy: Indefinite    Today's pertinent positives includes:  No significant changes since last visit      Results for orders placed or performed in visit on 11/16/22   POC PROTHROMBIN W/INR   Result Value Ref Range    VALID INTERNAL CONTROL POC Yes     Prothrombin time (POC) 52.4 seconds    INR POC 4.4        Adherence:   Able to recall regimen? YES  Miss/extra dose? NO  Need refill?  NO      Upcoming procedure(s):  N/A      Past Medical History:   Diagnosis Date    Atrial fibrillation (HCC)     CAD (coronary artery disease)     Chronic systolic HF (heart failure) (HCC)     Congestive heart failure (HCC)     DM type 2 (diabetes mellitus, type 2) (HCC)     Gout     HTN (hypertension)     Hypercholesterolemia     ICD (implantable cardioverter-defibrillator) in place     Long term current use of anticoagulant therapy     ASA & Warfarin    Nonischemic cardiomyopathy ([facility] [address] 2/8/2019    S/P cardiac cath 2/8/2019 2/8/19 cardiac cath with nonobstructive disease    Stroke ([facility] [address]     tia IN 2019    Syncope      No Known Allergies  Current Outpatient Medications   Medication Sig    warfarin (COUMADIN) 5 mg tablet Take 1.5 tablets (7.5 mg) by mouth every Saturday. Take 1 tablet (5 mg) every Sunday, Monday, Tuesday, Wednesday, Thursday and Friday. Or take as directed by the clinic.    dapagliflozin (Farxiga) 5 mg tab tablet Take 5 mg by mouth in the morning. acetaminophen (TYLENOL) 500 mg tablet Take 1,000 mg by mouth daily as needed for Pain. sacubitriL-valsartan (Entresto) 49-51 mg tab tablet Take 1 Tablet by mouth two (2) times a day. metFORMIN ER (GLUCOPHAGE XR) 500 mg tablet Take  by mouth. 3 tabs at night    atorvastatin (LIPITOR) 10 mg tablet TAKE 1 TABLET EVERY DAY    ACCU-CHEK DAVE PLUS METER misc USE TO TEST BLOOD SUGAR    multivitamin (ONE A DAY) tablet Take 1 Tab by mouth daily. aspirin delayed-release 81 mg tablet Take 81 mg by mouth daily. carvedilol (COREG) 25 mg tablet Take 25 mg by mouth two (2) times daily (with meals). potassium chloride (KLOR-CON M10) 10 mEq tablet Take 10 mEq by mouth two (2) times a day. furosemide (LASIX) 20 mg tablet Take 20 mg by mouth daily. amLODIPine (NORVASC) 5 mg tablet Take 5 mg by mouth daily. No current facility-administered medications for this visit.      Wt Readings from Last 3 Encounters:   05/12/22 218 lb (98.9 kg)   04/12/22 218 lb 9.6 oz (99.2 kg)   04/05/22 217 lb (98.4 kg)     BP Readings from Last 3 Encounters:   05/12/22 113/84   04/12/22 120/70   04/05/22 101/66     Pulse Readings from Last 3 Encounters:   05/12/22 69   04/12/22 70   04/05/22 75     Lab Results   Component Value Date/Time    WBC 4.7 04/05/2022 11:07 AM    HGB 16.7 04/05/2022 11:07 AM    HCT 49.0 04/05/2022 11:07 AM    PLATELET 614 (L) 40/74/8448 11:07 AM    MCV 84.5 04/05/2022 11:07 AM     Lab Results   Component Value Date/Time    Sodium 141 04/05/2022 11:07 AM    Potassium 3.8 04/05/2022 11:07 AM Chloride 111 (H) 04/05/2022 11:07 AM    CO2 23 04/05/2022 11:07 AM    Anion gap 7 04/05/2022 11:07 AM    Glucose 168 (H) 04/05/2022 11:07 AM    BUN 9 04/05/2022 11:07 AM    Creatinine 1.06 04/05/2022 11:07 AM    BUN/Creatinine ratio 8 (L) 04/05/2022 11:07 AM    GFR est AA >60 04/05/2022 11:07 AM    GFR est non-AA >60 04/05/2022 11:07 AM    Calcium 8.9 04/05/2022 11:07 AM    Bilirubin, total 0.4 01/19/2022 03:35 PM    Alk. phosphatase 94 01/19/2022 03:35 PM    Protein, total 7.1 01/19/2022 03:35 PM    Albumin 3.5 01/19/2022 03:35 PM    Globulin 3.6 01/19/2022 03:35 PM    A-G Ratio 1.0 (L) 01/19/2022 03:35 PM    ALT (SGPT) 32 01/19/2022 03:35 PM     CrCl cannot be calculated (Patient's most recent lab result is older than the maximum 180 days allowed.). INR History:   (normal INR range 0.8-1.2)     Date   INR   PT   Dose/Comments  11/16/22 4.4  52.4 hold doses 11/2, 11/3 and then resume 7.5 mg Sat; 5 mg daily ROW  11/02/22 5.6  66.6 7.5 mg Sat; 5 mg daily ROW  10/05/22 2.7  32.5 7.5 mg Sat; 5 mg daily ROW  09/22/22 3.1  37.2 7.5 mg Sat; 5 mg daily ROW  09/08/22 2.1  25.1 7.5 mg Sat; 5 mg daily ROW   08/31/22 1.2  14.2 5 mg 8/25 then hold for 5 days (8/26-8/30)  08/25/22 2.5  30.0 hold doses 8/16, 8/17 then 7.5 mg Sat; 5 mg daily ROW   08/16/22 4.2  50.0 7.5 mg Fri, Sat; 5 mg daily ROW  07/25/22 3.4  41.2 7.5 mg Fri, Sat; 5 mg daily ROW  07/05/22 3.0  35.5 hold dose 6/20 then resume 7.5 mg Fri, Sat; 5 mg daily ROW  06/20/22 4.0  48.0 7.5 mg Fri, Sat; 5 mg daily ROW  05/12/22 2.7  32.7 7.5 mg Fri, Sat; 5 mg daily ROW  04/15/22 2.3  27.3 7.5 mg Fri, Sat; 5 mg daily ROW    Medications that can increase  INR: multivitamin, glimepiride  Medications that can decrease INR: metformin    A/P:       Anticoagulation:  Considering Mr. Mely Cade past history, todays findings, and per Anticoagulation Collaborative Practice Agreement/Protocol:    Fingerstick POC INR (4.4) is Supratherapeutic for INR goal today.    Change warfarin to hold doses today (11/16) and tomorrow (11/17) and then resume 7.5 mg Sat; 5 mg daily ROW   INR is 4.4 and supratherapeutic. Patient denies extra doses of warfarin or changes to his medications. Patient has been previously therapeutic on current regimen and would like to continue current regimen. Will hold doses today (11/16) and tomorrow (11/17) and then resume 7.5 mg Sat; 5 mg daily ROW. Will recommend weekly dose reduction at next [facility] visit if INR remains supratherapeutic. Patient will recheck INR in 2 week(s) due to a holiday in 1 week. Patient was instructed to schedule an appointment in 2 week(s) prior to leaving the clinic. Medication reconciliation was completed during the visit. There are no discontinued medications. A full discussion of the nature of anticoagulants has been carried out. A full discussion of the need for frequent and regular monitoring, precise dosage adjustment and compliance was stressed. Side effects of potential bleeding were discussed and Mr. Rivero was instructed to call [phone] if there are any signs of abnormal bleeding. Mr. Rachana Quevedo was instructed to avoid any OTC items containing aspirin or ibuprofen and prior to starting any new OTC products to consult with his physician or pharmacist to ensure no drug interactions are present. Mr. Rachana Quevedo was instructed to avoid any major changes in his general diet and to avoid alcohol consumption. Mr. Rachana Quevedo was provided information in the AVS that includes topics on understanding coumadin therapy, drug interaction considerations, vitamin K and coumadin use, interactions with foods and supplements containing vitamin K, and the use of herbal products. Mr. Rachana Quevedo verbalized his understanding of all instructions and will call the office with any questions, concerns, or signs of abnormal bleeding or blood clot. Notifications of recommendations will be sent to Dr. Nathalie Wyatt for review.     Thank you,   Lalit Luis, [address] Only    Intervention Detail: Adherence Monitorin, Dose Adjustment: 1, reason: Therapy Optimization, and Lab(s) Ordered  Total # of Interventions Recommended: 3  Total # of Interventions Accepted: 3  Time Spent (min): 20

## 2022-12-01 ENCOUNTER — ANTI-COAG VISIT (OUTPATIENT)
Dept: PHARMACY | Age: 72
End: 2022-12-01
Payer: MEDICARE

## 2022-12-01 DIAGNOSIS — I48.0 PAF (PAROXYSMAL ATRIAL FIBRILLATION) (HCC): Primary | ICD-10-CM

## 2022-12-01 DIAGNOSIS — Z79.01 LONG TERM (CURRENT) USE OF ANTICOAGULANTS: ICD-10-CM

## 2022-12-01 LAB
INR BLD: 3
PT POC: 35.8 SECONDS
VALID INTERNAL CONTROL?: YES

## 2022-12-01 PROCEDURE — 99211 OFF/OP EST MAY X REQ PHY/QHP: CPT

## 2022-12-01 PROCEDURE — 85610 PROTHROMBIN TIME: CPT

## 2022-12-01 NOTE — PROGRESS NOTES
Pharmacy Progress Note - Anticoagulation Management    S/O:  Mr. Tom Kuhn  is a 67 y.o. male seen today for anticoagulation management for the diagnosis of Atrial Fibrillation. HPI: At last visit (11/16), INR was 4.4 and supratherapeutic. Patient denied extra doses of warfarin or changes to his medications. Patient has been previously therapeutic on current regimen and would like to continue current regimen. Will hold doses today (11/16) and tomorrow (11/17) and then resume 7.5 mg Sat; 5 mg daily ROW. Will recommend weekly dose reduction at next [facility] visit if INR remains supratherapeutic. Patient will recheck INR in 2 week(s) due to a holiday in 1 week. Interim History:    Warfarin start date: Prior to 3/6/20 per chart review. INR Goal:  2.0-3.0    Current warfarin regimen:  hold doses today (11/16) and tomorrow (11/17) and then resume 7.5 mg Sat; 5 mg daily ROW  Warfarin tablet strength:   5 mg   Duration of therapy: Indefinite    Today's pertinent positives includes:  No significant changes since last visit      Results for orders placed or performed in visit on 12/01/22   POC PROTHROMBIN W/INR   Result Value Ref Range    VALID INTERNAL CONTROL POC Yes     Prothrombin time (POC) 35.8 seconds    INR POC 3.0        Adherence:   Able to recall regimen? YES  Miss/extra dose? NO  Need refill?  NO      Upcoming procedure(s):  N/A      Past Medical History:   Diagnosis Date    Atrial fibrillation (HCC)     CAD (coronary artery disease)     Chronic systolic HF (heart failure) (HCC)     Congestive heart failure (HCC)     DM type 2 (diabetes mellitus, type 2) (HCC)     Gout     HTN (hypertension)     Hypercholesterolemia     ICD (implantable cardioverter-defibrillator) in place     Long term current use of anticoagulant therapy     ASA & Warfarin    Nonischemic cardiomyopathy ([address] 2/8/2019    S/P cardiac cath 2/8/2019 2/8/19 cardiac cath with nonobstructive disease    Stroke [facility])     tia IN 2019 Syncope      No Known Allergies  Current Outpatient Medications   Medication Sig    warfarin (COUMADIN) 5 mg tablet Take 1.5 tablets (7.5 mg) by mouth every Saturday. Take 1 tablet (5 mg) every Sunday, Monday, Tuesday, Wednesday, Thursday and Friday. Or take as directed by the clinic.    dapagliflozin (Farxiga) 5 mg tab tablet Take 5 mg by mouth in the morning. acetaminophen (TYLENOL) 500 mg tablet Take 1,000 mg by mouth daily as needed for Pain. sacubitriL-valsartan (Entresto) 49-51 mg tab tablet Take 1 Tablet by mouth two (2) times a day. metFORMIN ER (GLUCOPHAGE XR) 500 mg tablet Take  by mouth. 3 tabs at night    atorvastatin (LIPITOR) 10 mg tablet TAKE 1 TABLET EVERY DAY    ACCU-CHEK DAVE PLUS METER misc USE TO TEST BLOOD SUGAR    multivitamin (ONE A DAY) tablet Take 1 Tab by mouth daily. aspirin delayed-release 81 mg tablet Take 81 mg by mouth daily. carvedilol (COREG) 25 mg tablet Take 25 mg by mouth two (2) times daily (with meals). potassium chloride (KLOR-CON M10) 10 mEq tablet Take 10 mEq by mouth two (2) times a day. furosemide (LASIX) 20 mg tablet Take 20 mg by mouth daily. amLODIPine (NORVASC) 5 mg tablet Take 5 mg by mouth daily. No current facility-administered medications for this visit.      Wt Readings from Last 3 Encounters:   05/12/22 218 lb (98.9 kg)   04/12/22 218 lb 9.6 oz (99.2 kg)   04/05/22 217 lb (98.4 kg)     BP Readings from Last 3 Encounters:   05/12/22 113/84   04/12/22 120/70   04/05/22 101/66     Pulse Readings from Last 3 Encounters:   05/12/22 69   04/12/22 70   04/05/22 75     Lab Results   Component Value Date/Time    WBC 4.7 04/05/2022 11:07 AM    HGB 16.7 04/05/2022 11:07 AM    HCT 49.0 04/05/2022 11:07 AM    PLATELET 992 (L) 53/05/5865 11:07 AM    MCV 84.5 04/05/2022 11:07 AM     Lab Results   Component Value Date/Time    Sodium 141 04/05/2022 11:07 AM    Potassium 3.8 04/05/2022 11:07 AM    Chloride 111 (H) 04/05/2022 11:07 AM    CO2 23 04/05/2022 11:07 AM    Anion gap 7 04/05/2022 11:07 AM    Glucose 168 (H) 04/05/2022 11:07 AM    BUN 9 04/05/2022 11:07 AM    Creatinine 1.06 04/05/2022 11:07 AM    BUN/Creatinine ratio 8 (L) 04/05/2022 11:07 AM    GFR est AA >60 04/05/2022 11:07 AM    GFR est non-AA >60 04/05/2022 11:07 AM    Calcium 8.9 04/05/2022 11:07 AM    Bilirubin, total 0.4 01/19/2022 03:35 PM    Alk. phosphatase 94 01/19/2022 03:35 PM    Protein, total 7.1 01/19/2022 03:35 PM    Albumin 3.5 01/19/2022 03:35 PM    Globulin 3.6 01/19/2022 03:35 PM    A-G Ratio 1.0 (L) 01/19/2022 03:35 PM    ALT (SGPT) 32 01/19/2022 03:35 PM     CrCl cannot be calculated (Patient's most recent lab result is older than the maximum 180 days allowed.).       INR History:   (normal INR range 0.8-1.2)     Date   INR   PT   Dose/Comments  12/01/22 3.0  35.8 hold doses 11/16, 11/17 then resume 7.5 mg Sat; 5 mg daily ROW  11/16/22 4.4  52.4 hold doses 11/2, 11/3 and then resume 7.5 mg Sat; 5 mg daily ROW  11/02/22 5.6  66.6 7.5 mg Sat; 5 mg daily ROW  10/05/22 2.7  32.5 7.5 mg Sat; 5 mg daily ROW  09/22/22 3.1  37.2 7.5 mg Sat; 5 mg daily ROW  09/08/22 2.1  25.1 7.5 mg Sat; 5 mg daily ROW   08/31/22 1.2  14.2 5 mg 8/25 then hold for 5 days (8/26-8/30)  08/25/22 2.5  30.0 hold doses 8/16, 8/17 then 7.5 mg Sat; 5 mg daily ROW   08/16/22 4.2  50.0 7.5 mg Fri, Sat; 5 mg daily ROW  07/25/22 3.4  41.2 7.5 mg Fri, Sat; 5 mg daily ROW  07/05/22 3.0  35.5 hold dose 6/20 then resume 7.5 mg Fri, Sat; 5 mg daily ROW  06/20/22 4.0  48.0 7.5 mg Fri, Sat; 5 mg daily ROW  05/12/22 2.7  32.7 7.5 mg Fri, Sat; 5 mg daily ROW  04/15/22 2.3  27.3 7.5 mg Fri, Sat; 5 mg daily ROW    Medications that can increase  INR: multivitamin, glimepiride  Medications that can decrease INR: metformin    A/P:       Anticoagulation:  Considering Mr. Freddy Roman past history, todays findings, and per Anticoagulation Collaborative Practice Agreement/Protocol:    Fingerstick POC INR (3.0) is Therapeutic for INR goal today.   Continue warfarin 7.5 mg Sat; 5 mg daily ROW   INR is 3.0 and therapeutic. Patient denies changes to his medications and reports consistent intake of vitamin K foods. Patient will continue current regimen. Patient scheduled next appointment for 23. Patient was instructed to schedule an appointment in 5 week(s) prior to leaving the clinic. Medication reconciliation was completed during the visit. There are no discontinued medications. A full discussion of the nature of anticoagulants has been carried out. A full discussion of the need for frequent and regular monitoring, precise dosage adjustment and compliance was stressed. Side effects of potential bleeding were discussed and Mr. Rivero was instructed to call [phone] if there are any signs of abnormal bleeding. Mr. Luis Felipe Clark was instructed to avoid any OTC items containing aspirin or ibuprofen and prior to starting any new OTC products to consult with his physician or pharmacist to ensure no drug interactions are present. Mr. Luis Felipe Clark was instructed to avoid any major changes in his general diet and to avoid alcohol consumption. Mr. Luis Felipe Clark was provided information in the AVS that includes topics on understanding coumadin therapy, drug interaction considerations, vitamin K and coumadin use, interactions with foods and supplements containing vitamin K, and the use of herbal products. Mr. Luis Felipe Clark verbalized his understanding of all instructions and will call the office with any questions, concerns, or signs of abnormal bleeding or blood clot. Notifications of recommendations will be sent to Dr. Nell Fajardo for review.     Thank you,   Sai Hector, [id number] [facility] Tracking Only    Intervention Detail: Adherence Monitorin and Lab(s) Ordered  Total # of Interventions Recommended: 2  Total # of Interventions Accepted: 2  Time Spent (min): 20

## 2022-12-01 NOTE — PATIENT INSTRUCTIONS
Today your INR was 3.0 . Your goal INR is  2.0-3.0 . You have a 5 mg tablet of Coumadin (warfarin). Take Coumadin (warfarin) as follows: Take 7.5 mg (1.5 tablets) every Saturday; and 5 mg (1 tablet) daily rest of the week. Come back in 5 week(s) for your next finger stick/INR blood test.        Avoid any over the counter items containing aspirin or ibuprofen, and avoid great swings in general diet. Avoid alcohol consumption. Please notify the INR nurse if you are started on any new medication including over the counter or herbal supplements. Also, please notify your INR nurse if any of your other prescription or over the counter medications have been discontinued. Call [facility] at [phone] if you have any signs of abnormal bleeding/blood clot.  ------------------------------------------------------------------------------------------------------------------  Taking Warfarin Safely: Care Instructions    Your Care Instructions  Warfarin is a medicine that you take to prevent blood clots. It is often called a blood thinner. Doctors give warfarin (such as Coumadin) to reduce the risk of blood clots. You may be at risk for blood clots if you have atrial fibrillation or deep vein thrombosis. Some other health problems may also put you at risk. Warfarin slows the amount of time it takes for your blood to clot. It can cause bleeding problems. Even if you've been taking warfarin for a while, it's important to know how to take it safely. Foods and other medicines can affect the way warfarin works. Some can make warfarin work too well. This can cause bleeding problems. And some can make it work poorly, so that it does not prevent blood clots very well. You will need regular blood tests to check how long it takes for your blood to form a clot. This test is called a PT or prothrombin time test. The result of the test is called an INR level.  Depending on the test results, your doctor or anticoagulation clinic may adjust your dose of warfarin. Follow-up care is a key part of your treatment and safety. Be sure to make and go to all appointments, and call your doctor if you are having problems. It's also a good idea to know your test results and keep a list of the medicines you take. How can you care for yourself at home? Take warfarin safely  Take your warfarin at the same time each day. If you miss a dose of warfarin, don't take an extra dose to make up for it. Your doctor can tell you exactly what to do so you don't take too much or too little. Wear medical alert jewelry that lets others know that you take warfarin. You can buy this at most drugstores. Don't take warfarin if you are pregnant or planning to get pregnant. Talk to your doctor about how you can prevent getting pregnant while you are taking it. Don't change your dose or stop taking warfarin unless your doctor tells you to. Effects of medicines and food on warfarin  Don't start or stop taking any medicines, vitamins, or natural remedies unless you first talk to your doctor. Many medicines can affect how warfarin works. These include aspirin and other pain relievers, over-the-counter medicines, multivitamins, dietary supplements, and herbal products. Tell all of your doctors and pharmacists that you take warfarin. Some prescription medicines can affect how warfarin works. Keep the amount of vitamin K in your diet about the same from day to day. Do not suddenly eat a lot more or a lot less food that is rich in vitamin K than you usually do. Vitamin K affects how warfarin works and how your blood clots. Talk with your doctor before making big changes in your diet. Vitamin K is in many foods, such as:  Leafy greens, such as kale, cabbage, spinach, Swiss chard, and lettuce. Canola and soybean oils. Green vegetables, such as asparagus, broccoli, and [place] sprouts.   Vegetable drinks, green tea leaves, and some dietary supplement drinks. Avoid cranberry juice and other cranberry products. They can increase the effects of warfarin. Limit your use of alcohol. Avoid bleeding by preventing falls and injuries  Wear slippers or shoes with nonskid soles. Remove throw rugs and clutter. Rearrange furniture and electrical cords to keep them out of walking paths. Keep stairways, porches, and outside walkways well lit. Use night-lights in hallways and bathrooms. Be extra careful when you work with sharp tools or knives. When should you call for help? Call 911 anytime you think you may need emergency care. For example, call if:  You have a sudden, severe headache that is different from past headaches. Call your doctor now or seek immediate medical care if:  You have any abnormal bleeding, such as:  Nosebleeds. Vaginal bleeding that is different (heavier, more frequent, at a different time of the month) than what you are used to. Bloody or black stools, or rectal bleeding. Bloody or pink urine. Watch closely for changes in your health, and be sure to contact your doctor if you have any problems. Where can you learn more? Go to http://www.gray.com/. Enter X531 in the search box to learn more about \"Taking Warfarin Safely: Care Instructions. \"  Current as of: January 27, 2016  Content Version: 11.1  © 4644-6664 [employer]. Care instructions adapted under license by [employer] (which disclaims liability or warranty for this information). If you have questions about a medical condition or this instruction, always ask your healthcare professional. [place] [address] any warranty or liability for your use of this information.

## 2023-01-04 ENCOUNTER — ANTI-COAG VISIT (OUTPATIENT)
Dept: PHARMACY | Age: 73
End: 2023-01-04
Payer: MEDICARE

## 2023-01-04 DIAGNOSIS — I48.0 PAF (PAROXYSMAL ATRIAL FIBRILLATION) (HCC): Primary | ICD-10-CM

## 2023-01-04 DIAGNOSIS — Z79.01 LONG TERM (CURRENT) USE OF ANTICOAGULANTS: ICD-10-CM

## 2023-01-04 LAB
INR BLD: 2.7
PT POC: 32.1 SECONDS
VALID INTERNAL CONTROL?: YES

## 2023-01-04 PROCEDURE — 99212 OFFICE O/P EST SF 10 MIN: CPT

## 2023-01-04 PROCEDURE — 85610 PROTHROMBIN TIME: CPT

## 2023-01-04 RX ORDER — WARFARIN SODIUM 5 MG/1
TABLET ORAL
Qty: 110 TABLET | Refills: 1 | Status: SHIPPED | OUTPATIENT
Start: 2023-01-04

## 2023-01-04 NOTE — PATIENT INSTRUCTIONS
Today your INR was 2.7 . Your goal INR is  2.0-3.0 . You have a 5 mg tablet of Coumadin (warfarin). Take Coumadin (warfarin) as follows: Take 7.5 mg (1.5 tablets) every Saturday; and 5 mg (1 tablet) daily rest of the week. Come back in 4 week(s) for your next finger stick/INR blood test.        Avoid any over the counter items containing aspirin or ibuprofen, and avoid great swings in general diet. Avoid alcohol consumption. Please notify the INR nurse if you are started on any new medication including over the counter or herbal supplements. Also, please notify your INR nurse if any of your other prescription or over the counter medications have been discontinued. Call [facility] at [phone] if you have any signs of abnormal bleeding/blood clot.  ------------------------------------------------------------------------------------------------------------------  Taking Warfarin Safely: Care Instructions    Your Care Instructions  Warfarin is a medicine that you take to prevent blood clots. It is often called a blood thinner. Doctors give warfarin (such as Coumadin) to reduce the risk of blood clots. You may be at risk for blood clots if you have atrial fibrillation or deep vein thrombosis. Some other health problems may also put you at risk. Warfarin slows the amount of time it takes for your blood to clot. It can cause bleeding problems. Even if you've been taking warfarin for a while, it's important to know how to take it safely. Foods and other medicines can affect the way warfarin works. Some can make warfarin work too well. This can cause bleeding problems. And some can make it work poorly, so that it does not prevent blood clots very well. You will need regular blood tests to check how long it takes for your blood to form a clot. This test is called a PT or prothrombin time test. The result of the test is called an INR level.  Depending on the test results, your doctor or anticoagulation clinic may adjust your dose of warfarin. Follow-up care is a key part of your treatment and safety. Be sure to make and go to all appointments, and call your doctor if you are having problems. It's also a good idea to know your test results and keep a list of the medicines you take. How can you care for yourself at home? Take warfarin safely  Take your warfarin at the same time each day. If you miss a dose of warfarin, don't take an extra dose to make up for it. Your doctor can tell you exactly what to do so you don't take too much or too little. Wear medical alert jewelry that lets others know that you take warfarin. You can buy this at most drugstores. Don't take warfarin if you are pregnant or planning to get pregnant. Talk to your doctor about how you can prevent getting pregnant while you are taking it. Don't change your dose or stop taking warfarin unless your doctor tells you to. Effects of medicines and food on warfarin  Don't start or stop taking any medicines, vitamins, or natural remedies unless you first talk to your doctor. Many medicines can affect how warfarin works. These include aspirin and other pain relievers, over-the-counter medicines, multivitamins, dietary supplements, and herbal products. Tell all of your doctors and pharmacists that you take warfarin. Some prescription medicines can affect how warfarin works. Keep the amount of vitamin K in your diet about the same from day to day. Do not suddenly eat a lot more or a lot less food that is rich in vitamin K than you usually do. Vitamin K affects how warfarin works and how your blood clots. Talk with your doctor before making big changes in your diet. Vitamin K is in many foods, such as:  Leafy greens, such as kale, cabbage, spinach, Swiss chard, and lettuce. Canola and soybean oils. Green vegetables, such as asparagus, broccoli, and [place] sprouts.   Vegetable drinks, green tea leaves, and some dietary supplement drinks. Avoid cranberry juice and other cranberry products. They can increase the effects of warfarin. Limit your use of alcohol. Avoid bleeding by preventing falls and injuries  Wear slippers or shoes with nonskid soles. Remove throw rugs and clutter. Rearrange furniture and electrical cords to keep them out of walking paths. Keep stairways, porches, and outside walkways well lit. Use night-lights in hallways and bathrooms. Be extra careful when you work with sharp tools or knives. When should you call for help? Call 911 anytime you think you may need emergency care. For example, call if:  You have a sudden, severe headache that is different from past headaches. Call your doctor now or seek immediate medical care if:  You have any abnormal bleeding, such as:  Nosebleeds. Vaginal bleeding that is different (heavier, more frequent, at a different time of the month) than what you are used to. Bloody or black stools, or rectal bleeding. Bloody or pink urine. Watch closely for changes in your health, and be sure to contact your doctor if you have any problems. Where can you learn more? Go to http://www.gray.com/. Enter F821 in the search box to learn more about \"Taking Warfarin Safely: Care Instructions. \"  Current as of: January 27, 2016  Content Version: 11.1  © 7765-8787 [employer]. Care instructions adapted under license by [employer] (which disclaims liability or warranty for this information). If you have questions about a medical condition or this instruction, always ask your healthcare professional. [place] [address] any warranty or liability for your use of this information.

## 2023-01-04 NOTE — PROGRESS NOTES
Pharmacy Progress Note - Anticoagulation Management    S/O:  Mr. Jacek Patel  is a 67 y.o. male seen today for anticoagulation management for the diagnosis of Atrial Fibrillation. HPI: At last visit (12/1), INR was 3.0 and therapeutic. Patient denied changes to his medications and reported consistent intake of vitamin K foods. Patient will continue current regimen. Patient scheduled next appointment for 1/4/23. Interim History:    Warfarin start date: Prior to 3/6/20 per chart review. INR Goal:  2.0-3.0    Current warfarin regimen:  7.5 mg Sat; 5 mg daily ROW   Warfarin tablet strength:   5 mg   Duration of therapy: Indefinite    Today's pertinent positives includes:  No significant changes since last visit      Results for orders placed or performed in visit on 01/04/23   POC PROTHROMBIN W/INR   Result Value Ref Range    VALID INTERNAL CONTROL POC Yes     Prothrombin time (POC) 32.1 seconds    INR POC 2.7        Adherence:   Able to recall regimen? YES  Miss/extra dose? NO  Need refill? YES      Upcoming procedure(s):  N/A      Past Medical History:   Diagnosis Date    Atrial fibrillation (HCC)     CAD (coronary artery disease)     Chronic systolic HF (heart failure) (HCC)     Congestive heart failure (HCC)     DM type 2 (diabetes mellitus, type 2) (HCC)     Gout     HTN (hypertension)     Hypercholesterolemia     ICD (implantable cardioverter-defibrillator) in place     Long term current use of anticoagulant therapy     ASA & Warfarin    Nonischemic cardiomyopathy ([facility] [address] 2/8/2019    S/P cardiac cath 2/8/2019 2/8/19 cardiac cath with nonobstructive disease    Stroke [facility])     tia IN 2019    Syncope      No Known Allergies  Current Outpatient Medications   Medication Sig    warfarin (COUMADIN) 5 mg tablet Take 1.5 tablets (7.5 mg) by mouth every Saturday. Take 1 tablet (5 mg) every Sunday, Monday, Tuesday, Wednesday, Thursday and Friday.  Or take as directed by the clinic.    dapagliflozin Nicanorsharlene Moseley) 5 mg tab tablet Take 5 mg by mouth in the morning. acetaminophen (TYLENOL) 500 mg tablet Take 1,000 mg by mouth daily as needed for Pain. sacubitriL-valsartan (Entresto) 49-51 mg tab tablet Take 1 Tablet by mouth two (2) times a day. metFORMIN ER (GLUCOPHAGE XR) 500 mg tablet Take  by mouth. 3 tabs at night    atorvastatin (LIPITOR) 10 mg tablet TAKE 1 TABLET EVERY DAY    ACCU-CHEK DAVE PLUS METER misc USE TO TEST BLOOD SUGAR    multivitamin (ONE A DAY) tablet Take 1 Tab by mouth daily. aspirin delayed-release 81 mg tablet Take 81 mg by mouth daily. carvedilol (COREG) 25 mg tablet Take 25 mg by mouth two (2) times daily (with meals). potassium chloride (KLOR-CON M10) 10 mEq tablet Take 10 mEq by mouth two (2) times a day. furosemide (LASIX) 20 mg tablet Take 20 mg by mouth daily. amLODIPine (NORVASC) 5 mg tablet Take 5 mg by mouth daily. No current facility-administered medications for this visit.      Wt Readings from Last 3 Encounters:   05/12/22 218 lb (98.9 kg)   04/12/22 218 lb 9.6 oz (99.2 kg)   04/05/22 217 lb (98.4 kg)     BP Readings from Last 3 Encounters:   05/12/22 113/84   04/12/22 120/70   04/05/22 101/66     Pulse Readings from Last 3 Encounters:   05/12/22 69   04/12/22 70   04/05/22 75     Lab Results   Component Value Date/Time    WBC 4.7 04/05/2022 11:07 AM    HGB 16.7 04/05/2022 11:07 AM    HCT 49.0 04/05/2022 11:07 AM    PLATELET 126 (L) 05/23/8881 11:07 AM    MCV 84.5 04/05/2022 11:07 AM     Lab Results   Component Value Date/Time    Sodium 141 04/05/2022 11:07 AM    Potassium 3.8 04/05/2022 11:07 AM    Chloride 111 (H) 04/05/2022 11:07 AM    CO2 23 04/05/2022 11:07 AM    Anion gap 7 04/05/2022 11:07 AM    Glucose 168 (H) 04/05/2022 11:07 AM    BUN 9 04/05/2022 11:07 AM    Creatinine 1.06 04/05/2022 11:07 AM    BUN/Creatinine ratio 8 (L) 04/05/2022 11:07 AM    GFR est AA >60 04/05/2022 11:07 AM    GFR est non-AA >60 04/05/2022 11:07 AM    Calcium 8.9 04/05/2022 11:07 AM    Bilirubin, total 0.4 01/19/2022 03:35 PM    Alk. phosphatase 94 01/19/2022 03:35 PM    Protein, total 7.1 01/19/2022 03:35 PM    Albumin 3.5 01/19/2022 03:35 PM    Globulin 3.6 01/19/2022 03:35 PM    A-G Ratio 1.0 (L) 01/19/2022 03:35 PM    ALT (SGPT) 32 01/19/2022 03:35 PM     CrCl cannot be calculated (Patient's most recent lab result is older than the maximum 180 days allowed.). INR History:   (normal INR range 0.8-1.2)     Date   INR   PT   Dose/Comments  01/04/23 2.7  32.1 7.5 mg Sat; 5 mg daily ROW  12/01/22 3.0  35.8 7.5 mg Sat; 5 mg daily ROW   11/16/22 4.4  52.4 hold doses 11/2, 11/3 and then resume 7.5 mg Sat; 5 mg daily ROW  11/02/22 5.6  66.6 7.5 mg Sat; 5 mg daily ROW  10/05/22 2.7  32.5 7.5 mg Sat; 5 mg daily ROW  09/22/22 3.1  37.2 7.5 mg Sat; 5 mg daily ROW  09/08/22 2.1  25.1 7.5 mg Sat; 5 mg daily ROW   08/31/22 1.2  14.2 5 mg 8/25 then hold for 5 days (8/26-8/30)  08/25/22 2.5  30.0 hold doses 8/16, 8/17 then 7.5 mg Sat; 5 mg daily ROW   08/16/22 4.2  50.0 7.5 mg Fri, Sat; 5 mg daily ROW  07/25/22 3.4  41.2 7.5 mg Fri, Sat; 5 mg daily ROW  07/05/22 3.0  35.5 hold dose 6/20 then resume 7.5 mg Fri, Sat; 5 mg daily ROW  06/20/22 4.0  48.0 7.5 mg Fri, Sat; 5 mg daily ROW  05/12/22 2.7  32.7 7.5 mg Fri, Sat; 5 mg daily ROW  04/15/22 2.3  27.3 7.5 mg Fri, Sat; 5 mg daily ROW    Medications that can increase  INR: multivitamin, glimepiride  Medications that can decrease INR: metformin    A/P:       Anticoagulation:  Considering Mr. Kady Marte past history, todays findings, and per Anticoagulation Collaborative Practice Agreement/Protocol:    Fingerstick POC INR (2.7) is Therapeutic for INR goal today. Continue warfarin 7.5 mg Sat; 5 mg daily ROW   INR is 2.7 and therapeutic. Patient denies changes to his medications and reports consistent intake of vitamin K foods. Patient will continue current regimen and recheck INR in 4 week(s).        Patient was instructed to schedule an appointment in 4 week(s) prior to leaving the clinic. Medication reconciliation was completed during the visit. Medications Discontinued During This Encounter   Medication Reason    warfarin (COUMADIN) 5 mg tablet REORDER         A full discussion of the nature of anticoagulants has been carried out. A full discussion of the need for frequent and regular monitoring, precise dosage adjustment and compliance was stressed. Side effects of potential bleeding were discussed and Mr. Rivero was instructed to call [phone] if there are any signs of abnormal bleeding. Mr. Anne-Marie Rubio was instructed to avoid any OTC items containing aspirin or ibuprofen and prior to starting any new OTC products to consult with his physician or pharmacist to ensure no drug interactions are present. Mr. Anne-Marie Rubio was instructed to avoid any major changes in his general diet and to avoid alcohol consumption. Mr. Anne-Marie Rubio was provided information in the AVS that includes topics on understanding coumadin therapy, drug interaction considerations, vitamin K and coumadin use, interactions with foods and supplements containing vitamin K, and the use of herbal products. Mr. Anne-Marie Rubio verbalized his understanding of all instructions and will call the office with any questions, concerns, or signs of abnormal bleeding or blood clot. Notifications of recommendations will be sent to Dr. Nickie Adams for review.     Thank you,   Dai Mckeon, [id number] Tracking Only    Intervention Detail: Adherence Monitorin, Lab(s) Ordered, and Refill(s) Provided   Total # of Interventions Recommended: 3  Total # of Interventions Accepted: 3  Time Spent (min): 20

## 2023-02-01 ENCOUNTER — ANTI-COAG VISIT (OUTPATIENT)
Dept: PHARMACY | Age: 73
End: 2023-02-01
Payer: MEDICARE

## 2023-02-01 DIAGNOSIS — I48.0 PAF (PAROXYSMAL ATRIAL FIBRILLATION) (HCC): Primary | ICD-10-CM

## 2023-02-01 DIAGNOSIS — Z79.01 LONG TERM (CURRENT) USE OF ANTICOAGULANTS: ICD-10-CM

## 2023-02-01 LAB
INR BLD: 3.8
PT POC: 45.9 SECONDS
VALID INTERNAL CONTROL?: YES

## 2023-02-01 PROCEDURE — 99211 OFF/OP EST MAY X REQ PHY/QHP: CPT

## 2023-02-01 PROCEDURE — 85610 PROTHROMBIN TIME: CPT

## 2023-02-01 NOTE — PATIENT INSTRUCTIONS
Today your INR was 3.8 . Your goal INR is  2.0-3.0 . You have a 5 mg tablet of Coumadin (warfarin). Take Coumadin (warfarin) as follows: Take 7.5 mg (1.5 tablets) every Saturday; and 5 mg (1 tablet) daily rest of the week. Come back in 2 week(s) for your next finger stick/INR blood test.        Avoid any over the counter items containing aspirin or ibuprofen, and avoid great swings in general diet. Avoid alcohol consumption. Please notify the INR nurse if you are started on any new medication including over the counter or herbal supplements. Also, please notify your INR nurse if any of your other prescription or over the counter medications have been discontinued. Call [facility] at [phone] if you have any signs of abnormal bleeding/blood clot.  ------------------------------------------------------------------------------------------------------------------  Taking Warfarin Safely: Care Instructions    Your Care Instructions  Warfarin is a medicine that you take to prevent blood clots. It is often called a blood thinner. Doctors give warfarin (such as Coumadin) to reduce the risk of blood clots. You may be at risk for blood clots if you have atrial fibrillation or deep vein thrombosis. Some other health problems may also put you at risk. Warfarin slows the amount of time it takes for your blood to clot. It can cause bleeding problems. Even if you've been taking warfarin for a while, it's important to know how to take it safely. Foods and other medicines can affect the way warfarin works. Some can make warfarin work too well. This can cause bleeding problems. And some can make it work poorly, so that it does not prevent blood clots very well. You will need regular blood tests to check how long it takes for your blood to form a clot. This test is called a PT or prothrombin time test. The result of the test is called an INR level.  Depending on the test results, your doctor or anticoagulation clinic may adjust your dose of warfarin. Follow-up care is a key part of your treatment and safety. Be sure to make and go to all appointments, and call your doctor if you are having problems. It's also a good idea to know your test results and keep a list of the medicines you take. How can you care for yourself at home? Take warfarin safely  Take your warfarin at the same time each day. If you miss a dose of warfarin, don't take an extra dose to make up for it. Your doctor can tell you exactly what to do so you don't take too much or too little. Wear medical alert jewelry that lets others know that you take warfarin. You can buy this at most drugstores. Don't take warfarin if you are pregnant or planning to get pregnant. Talk to your doctor about how you can prevent getting pregnant while you are taking it. Don't change your dose or stop taking warfarin unless your doctor tells you to. Effects of medicines and food on warfarin  Don't start or stop taking any medicines, vitamins, or natural remedies unless you first talk to your doctor. Many medicines can affect how warfarin works. These include aspirin and other pain relievers, over-the-counter medicines, multivitamins, dietary supplements, and herbal products. Tell all of your doctors and pharmacists that you take warfarin. Some prescription medicines can affect how warfarin works. Keep the amount of vitamin K in your diet about the same from day to day. Do not suddenly eat a lot more or a lot less food that is rich in vitamin K than you usually do. Vitamin K affects how warfarin works and how your blood clots. Talk with your doctor before making big changes in your diet. Vitamin K is in many foods, such as:  Leafy greens, such as kale, cabbage, spinach, Swiss chard, and lettuce. Canola and soybean oils. Green vegetables, such as asparagus, broccoli, and [place] sprouts.   Vegetable drinks, green tea leaves, and some dietary supplement drinks. Avoid cranberry juice and other cranberry products. They can increase the effects of warfarin. Limit your use of alcohol. Avoid bleeding by preventing falls and injuries  Wear slippers or shoes with nonskid soles. Remove throw rugs and clutter. Rearrange furniture and electrical cords to keep them out of walking paths. Keep stairways, porches, and outside walkways well lit. Use night-lights in hallways and bathrooms. Be extra careful when you work with sharp tools or knives. When should you call for help? Call 911 anytime you think you may need emergency care. For example, call if:  You have a sudden, severe headache that is different from past headaches. Call your doctor now or seek immediate medical care if:  You have any abnormal bleeding, such as:  Nosebleeds. Vaginal bleeding that is different (heavier, more frequent, at a different time of the month) than what you are used to. Bloody or black stools, or rectal bleeding. Bloody or pink urine. Watch closely for changes in your health, and be sure to contact your doctor if you have any problems. Where can you learn more? Go to http://manuelito-mc.info/. Enter A424 in the search box to learn more about \"Taking Warfarin Safely: Care Instructions. \"  Current as of: January 27, 2016  Content Version: 11.1  © 6427-1599 [employer]. Care instructions adapted under license by [employer] (which disclaims liability or warranty for this information). If you have questions about a medical condition or this instruction, always ask your healthcare professional. [place] [address] any warranty or liability for your use of this information.

## 2023-02-01 NOTE — PROGRESS NOTES
Pharmacy Progress Note - Anticoagulation Management    S/O:  Mr. Shonda Esparza.  is a 67 y.o. male seen today for anticoagulation management for the diagnosis of Atrial Fibrillation. HPI: At last visit (1/4), INR was 2.7 and therapeutic. Patient denied changes to his medications and reported consistent intake of vitamin K foods. Patient will continue current regimen and recheck INR in 4 week(s). Interim History:    Warfarin start date: Prior to 3/6/20 per chart review. INR Goal:  2.0-3.0    Current warfarin regimen:  7.5 mg Sat; 5 mg daily ROW   Warfarin tablet strength:   5 mg   Duration of therapy: Indefinite    Today's pertinent positives includes:  Change in diet/appetite    Patient reports eating less vitamin K foods over the past week. Results for orders placed or performed in visit on 02/01/23   POC PROTHROMBIN W/INR   Result Value Ref Range    VALID INTERNAL CONTROL POC Yes     Prothrombin time (POC) 45.9 seconds    INR POC 3.8        Adherence:   Able to recall regimen? YES  Miss/extra dose? NO  Need refill? NO      Upcoming procedure(s):  N/A      Past Medical History:   Diagnosis Date    Atrial fibrillation (HCC)     CAD (coronary artery disease)     Chronic systolic HF (heart failure) (HCC)     Congestive heart failure (HCC)     DM type 2 (diabetes mellitus, type 2) (HCC)     Gout     HTN (hypertension)     Hypercholesterolemia     ICD (implantable cardioverter-defibrillator) in place     Long term current use of anticoagulant therapy     ASA & Warfarin    Nonischemic cardiomyopathy ([facility] [address] 2/8/2019    S/P cardiac cath 2/8/2019 2/8/19 cardiac cath with nonobstructive disease    Stroke [facility])     tia IN 2019    Syncope      No Known Allergies  Current Outpatient Medications   Medication Sig    warfarin (COUMADIN) 5 mg tablet Take 1.5 tablets (7.5 mg) by mouth every Saturday. Take 1 tablet (5 mg) every Sunday, Monday, Tuesday, Wednesday, Thursday and Friday.  Or take as directed by the clinic.    dapagliflozin (Farxiga) 5 mg tab tablet Take 5 mg by mouth in the morning. acetaminophen (TYLENOL) 500 mg tablet Take 1,000 mg by mouth daily as needed for Pain. sacubitriL-valsartan (Entresto) 49-51 mg tab tablet Take 1 Tablet by mouth two (2) times a day. metFORMIN ER (GLUCOPHAGE XR) 500 mg tablet Take  by mouth. 3 tabs at night    atorvastatin (LIPITOR) 10 mg tablet TAKE 1 TABLET EVERY DAY    ACCU-CHEK DAVE PLUS METER misc USE TO TEST BLOOD SUGAR    multivitamin (ONE A DAY) tablet Take 1 Tab by mouth daily. aspirin delayed-release 81 mg tablet Take 81 mg by mouth daily. carvedilol (COREG) 25 mg tablet Take 25 mg by mouth two (2) times daily (with meals). potassium chloride (KLOR-CON M10) 10 mEq tablet Take 10 mEq by mouth two (2) times a day. furosemide (LASIX) 20 mg tablet Take 20 mg by mouth daily. amLODIPine (NORVASC) 5 mg tablet Take 5 mg by mouth daily. No current facility-administered medications for this visit.      Wt Readings from Last 3 Encounters:   05/12/22 218 lb (98.9 kg)   04/12/22 218 lb 9.6 oz (99.2 kg)   04/05/22 217 lb (98.4 kg)     BP Readings from Last 3 Encounters:   05/12/22 113/84   04/12/22 120/70   04/05/22 101/66     Pulse Readings from Last 3 Encounters:   05/12/22 69   04/12/22 70   04/05/22 75     Lab Results   Component Value Date/Time    WBC 4.7 04/05/2022 11:07 AM    HGB 16.7 04/05/2022 11:07 AM    HCT 49.0 04/05/2022 11:07 AM    PLATELET 194 (L) 92/74/1172 11:07 AM    MCV 84.5 04/05/2022 11:07 AM     Lab Results   Component Value Date/Time    Sodium 141 04/05/2022 11:07 AM    Potassium 3.8 04/05/2022 11:07 AM    Chloride 111 (H) 04/05/2022 11:07 AM    CO2 23 04/05/2022 11:07 AM    Anion gap 7 04/05/2022 11:07 AM    Glucose 168 (H) 04/05/2022 11:07 AM    BUN 9 04/05/2022 11:07 AM    Creatinine 1.06 04/05/2022 11:07 AM    BUN/Creatinine ratio 8 (L) 04/05/2022 11:07 AM    GFR est AA >60 04/05/2022 11:07 AM    GFR est non-AA >60 04/05/2022 11:07 AM    Calcium 8.9 04/05/2022 11:07 AM    Bilirubin, total 0.4 01/19/2022 03:35 PM    Alk. phosphatase 94 01/19/2022 03:35 PM    Protein, total 7.1 01/19/2022 03:35 PM    Albumin 3.5 01/19/2022 03:35 PM    Globulin 3.6 01/19/2022 03:35 PM    A-G Ratio 1.0 (L) 01/19/2022 03:35 PM    ALT (SGPT) 32 01/19/2022 03:35 PM     CrCl cannot be calculated (Patient's most recent lab result is older than the maximum 180 days allowed.). INR History:   (normal INR range 0.8-1.2)     Date   INR   PT   Dose/Comments  02/01/23 3.8  45.9 7.5 mg Sat; 5 mg daily ROW  01/04/23 2.7  32.1 7.5 mg Sat; 5 mg daily ROW  12/01/22 3.0  35.8 7.5 mg Sat; 5 mg daily ROW   11/16/22 4.4  52.4 hold doses 11/2, 11/3 and then resume 7.5 mg Sat; 5 mg daily ROW  11/02/22 5.6  66.6 7.5 mg Sat; 5 mg daily ROW  10/05/22 2.7  32.5 7.5 mg Sat; 5 mg daily ROW  09/22/22 3.1  37.2 7.5 mg Sat; 5 mg daily ROW  09/08/22 2.1  25.1 7.5 mg Sat; 5 mg daily ROW   08/31/22 1.2  14.2 5 mg 8/25 then hold for 5 days (8/26-8/30)  08/25/22 2.5  30.0 hold doses 8/16, 8/17 then 7.5 mg Sat; 5 mg daily ROW   08/16/22 4.2  50.0 7.5 mg Fri, Sat; 5 mg daily ROW  07/25/22 3.4  41.2 7.5 mg Fri, Sat; 5 mg daily ROW  07/05/22 3.0  35.5 hold dose 6/20 then resume 7.5 mg Fri, Sat; 5 mg daily ROW  06/20/22 4.0  48.0 7.5 mg Fri, Sat; 5 mg daily ROW  05/12/22 2.7  32.7 7.5 mg Fri, Sat; 5 mg daily ROW  04/15/22 2.3  27.3 7.5 mg Fri, Sat; 5 mg daily ROW    Medications that can increase  INR: multivitamin, glimepiride  Medications that can decrease INR: metformin    A/P:       Anticoagulation:  Considering Mr. Corwin King past history, todays findings, and per Anticoagulation Collaborative Practice Agreement/Protocol:    Fingerstick POC INR (3.8) is Supratherapeutic for INR goal today. Continue warfarin 7.5 mg Sat; 5 mg daily ROW   INR is 3.8 and supratherapeutic. Patient denies extra doses of warfarin or changes to his medications.  Patient reports eating less vitamin K foods over the past week. Patient has been previously therapeutic on current regimen and will continue warfarin 7.5 mg Sat; 5 mg daily ROW. Encouraged patient to eat a serving of food with vitamin K today () and tomorrow (). Patient will recheck INR in 2 week(s). Patient was instructed to schedule an appointment in 2 week(s) prior to leaving the clinic. Medication reconciliation was completed during the visit. There are no discontinued medications. A full discussion of the nature of anticoagulants has been carried out. A full discussion of the need for frequent and regular monitoring, precise dosage adjustment and compliance was stressed. Side effects of potential bleeding were discussed and Mr. Rivero was instructed to call [phone] if there are any signs of abnormal bleeding. Mr. Yohannes Poe was instructed to avoid any OTC items containing aspirin or ibuprofen and prior to starting any new OTC products to consult with his physician or pharmacist to ensure no drug interactions are present. Mr. Yohannes Poe was instructed to avoid any major changes in his general diet and to avoid alcohol consumption. Mr. Yohannes Poe was provided information in the AVS that includes topics on understanding coumadin therapy, drug interaction considerations, vitamin K and coumadin use, interactions with foods and supplements containing vitamin K, and the use of herbal products. Mr. Yohannes Poe verbalized his understanding of all instructions and will call the office with any questions, concerns, or signs of abnormal bleeding or blood clot. Notifications of recommendations will be sent to Dr. Cali Andrews for review.     Thank you,   Yamileth Palma, [id number] Tracking Only    Intervention Detail: Adherence Monitorin and Lab(s) Ordered  Total # of Interventions Recommended: 2  Total # of Interventions Accepted: 2  Time Spent (min): 15

## 2023-02-16 ENCOUNTER — ANTI-COAG VISIT (OUTPATIENT)
Dept: PHARMACY | Age: 73
End: 2023-02-16
Payer: MEDICARE

## 2023-02-16 DIAGNOSIS — I48.0 PAF (PAROXYSMAL ATRIAL FIBRILLATION) (HCC): Primary | ICD-10-CM

## 2023-02-16 DIAGNOSIS — Z79.01 LONG TERM (CURRENT) USE OF ANTICOAGULANTS: ICD-10-CM

## 2023-02-16 LAB
INR BLD: 4
PT POC: 48.2 SECONDS
VALID INTERNAL CONTROL?: YES

## 2023-02-16 PROCEDURE — 85610 PROTHROMBIN TIME: CPT

## 2023-02-16 PROCEDURE — 99212 OFFICE O/P EST SF 10 MIN: CPT

## 2023-02-16 NOTE — PROGRESS NOTES
Pharmacy Progress Note - Anticoagulation Management    S/O:  Mr. Rupert Vick  is a 67 y.o. male seen today for anticoagulation management for the diagnosis of Atrial Fibrillation. HPI: At last visit (2/1), INR was 3.8 and supratherapeutic. Patient denied extra doses of warfarin or changes to his medications. Patient reported eating less vitamin K foods over the past week. Patient has been previously therapeutic on current regimen and will continue warfarin 7.5 mg Sat; 5 mg daily ROW. Encouraged patient to eat a serving of food with vitamin K today (2/1) and tomorrow (2/2). Patient will recheck INR in 2 week(s). Interim History:    Warfarin start date: Prior to 3/6/20 per chart review. INR Goal:  2.0-3.0    Current warfarin regimen:  7.5 mg Sat; 5 mg daily ROW   Warfarin tablet strength:   5 mg   Duration of therapy: Indefinite    Today's pertinent positives includes:  No significant changes since last visit      Results for orders placed or performed in visit on 02/16/23   POC PROTHROMBIN W/INR   Result Value Ref Range    VALID INTERNAL CONTROL POC Yes     Prothrombin time (POC) 48.2 seconds    INR POC 4.0        Adherence:   Able to recall regimen? YES  Miss/extra dose? NO  Need refill?  NO      Upcoming procedure(s):  N/A      Past Medical History:   Diagnosis Date    Atrial fibrillation (HCC)     CAD (coronary artery disease)     Chronic systolic HF (heart failure) (HCC)     Congestive heart failure (HCC)     DM type 2 (diabetes mellitus, type 2) (HCC)     Gout     HTN (hypertension)     Hypercholesterolemia     ICD (implantable cardioverter-defibrillator) in place     Long term current use of anticoagulant therapy     ASA & Warfarin    Nonischemic cardiomyopathy ([address] 2/8/2019    S/P cardiac cath 2/8/2019 2/8/19 cardiac cath with nonobstructive disease    Stroke [facility])     tia IN 2019    Syncope      No Known Allergies  Current Outpatient Medications   Medication Sig    warfarin (COUMADIN) 5 mg tablet Take 1.5 tablets (7.5 mg) by mouth every Saturday. Take 1 tablet (5 mg) every Sunday, Monday, Tuesday, Wednesday, Thursday and Friday. Or take as directed by the clinic.    dapagliflozin (Farxiga) 5 mg tab tablet Take 5 mg by mouth in the morning. acetaminophen (TYLENOL) 500 mg tablet Take 1,000 mg by mouth daily as needed for Pain. sacubitriL-valsartan (Entresto) 49-51 mg tab tablet Take 1 Tablet by mouth two (2) times a day. metFORMIN ER (GLUCOPHAGE XR) 500 mg tablet Take  by mouth. 3 tabs at night    atorvastatin (LIPITOR) 10 mg tablet TAKE 1 TABLET EVERY DAY    ACCU-CHEK DAVE PLUS METER misc USE TO TEST BLOOD SUGAR    multivitamin (ONE A DAY) tablet Take 1 Tab by mouth daily. aspirin delayed-release 81 mg tablet Take 81 mg by mouth daily. carvedilol (COREG) 25 mg tablet Take 25 mg by mouth two (2) times daily (with meals). potassium chloride (KLOR-CON M10) 10 mEq tablet Take 10 mEq by mouth two (2) times a day. furosemide (LASIX) 20 mg tablet Take 20 mg by mouth daily. amLODIPine (NORVASC) 5 mg tablet Take 5 mg by mouth daily. No current facility-administered medications for this visit.      Wt Readings from Last 3 Encounters:   05/12/22 218 lb (98.9 kg)   04/12/22 218 lb 9.6 oz (99.2 kg)   04/05/22 217 lb (98.4 kg)     BP Readings from Last 3 Encounters:   05/12/22 113/84   04/12/22 120/70   04/05/22 101/66     Pulse Readings from Last 3 Encounters:   05/12/22 69   04/12/22 70   04/05/22 75     Lab Results   Component Value Date/Time    WBC 4.7 04/05/2022 11:07 AM    HGB 16.7 04/05/2022 11:07 AM    HCT 49.0 04/05/2022 11:07 AM    PLATELET 910 (L) 11/17/6339 11:07 AM    MCV 84.5 04/05/2022 11:07 AM     Lab Results   Component Value Date/Time    Sodium 141 04/05/2022 11:07 AM    Potassium 3.8 04/05/2022 11:07 AM    Chloride 111 (H) 04/05/2022 11:07 AM    CO2 23 04/05/2022 11:07 AM    Anion gap 7 04/05/2022 11:07 AM    Glucose 168 (H) 04/05/2022 11:07 AM    BUN 9 04/05/2022 11:07 AM    Creatinine 1.06 04/05/2022 11:07 AM    BUN/Creatinine ratio 8 (L) 04/05/2022 11:07 AM    GFR est AA >60 04/05/2022 11:07 AM    GFR est non-AA >60 04/05/2022 11:07 AM    Calcium 8.9 04/05/2022 11:07 AM    Bilirubin, total 0.4 01/19/2022 03:35 PM    Alk. phosphatase 94 01/19/2022 03:35 PM    Protein, total 7.1 01/19/2022 03:35 PM    Albumin 3.5 01/19/2022 03:35 PM    Globulin 3.6 01/19/2022 03:35 PM    A-G Ratio 1.0 (L) 01/19/2022 03:35 PM    ALT (SGPT) 32 01/19/2022 03:35 PM     CrCl cannot be calculated (Patient's most recent lab result is older than the maximum 180 days allowed.). INR History:   (normal INR range 0.8-1.2)     Date   INR   PT   Dose/Comments  02/16/23 4.0  48.2 7.5 mg Sat; 5 mg daily ROW  02/01/23 3.8  45.9 7.5 mg Sat; 5 mg daily ROW  01/04/23 2.7  32.1 7.5 mg Sat; 5 mg daily ROW  12/01/22 3.0  35.8 7.5 mg Sat; 5 mg daily ROW   11/16/22 4.4  52.4 hold doses 11/2, 11/3 and then resume 7.5 mg Sat; 5 mg daily ROW  11/02/22 5.6  66.6 7.5 mg Sat; 5 mg daily ROW  10/05/22 2.7  32.5 7.5 mg Sat; 5 mg daily ROW  09/22/22 3.1  37.2 7.5 mg Sat; 5 mg daily ROW  09/08/22 2.1  25.1 7.5 mg Sat; 5 mg daily ROW   08/31/22 1.2  14.2 5 mg 8/25 then hold for 5 days (8/26-8/30)  08/25/22 2.5  30.0 hold doses 8/16, 8/17 then 7.5 mg Sat; 5 mg daily ROW   08/16/22 4.2  50.0 7.5 mg Fri, Sat; 5 mg daily ROW  07/25/22 3.4  41.2 7.5 mg Fri, Sat; 5 mg daily ROW  07/05/22 3.0  35.5 hold dose 6/20 then resume 7.5 mg Fri, Sat; 5 mg daily ROW      Medications that can increase  INR: multivitamin, glimepiride  Medications that can decrease INR: metformin    A/P:       Anticoagulation:  Considering Mr. Guadlupe Sever past history, todays findings, and per Anticoagulation Collaborative Practice Agreement/Protocol:    Fingerstick POC INR (4.0) is Supratherapeutic for INR goal today. Change warfarin to hold dose today (2/16) and then take 5 mg daily   INR is 4.0 and supratherapeutic.  Patient denies extra doses of warfarin or changes to his medications. Patient reports consistent intake of vitamin K foods. Will reduce weekly dose from 37.5 mg to 35 mg (~ 7%) and patient will hold dose today () and then take 5 mg daily. Patient will recheck INR in 2 week(s). Patient was instructed to schedule an appointment in 2 week(s) prior to leaving the clinic. Medication reconciliation was completed during the visit. There are no discontinued medications. A full discussion of the nature of anticoagulants has been carried out. A full discussion of the need for frequent and regular monitoring, precise dosage adjustment and compliance was stressed. Side effects of potential bleeding were discussed and Mr. Rivero was instructed to call [phone] if there are any signs of abnormal bleeding. Mr. Chetna Manning was instructed to avoid any OTC items containing aspirin or ibuprofen and prior to starting any new OTC products to consult with his physician or pharmacist to ensure no drug interactions are present. Mr. Chetna Manning was instructed to avoid any major changes in his general diet and to avoid alcohol consumption. Mr. Chetna Manning was provided information in the AVS that includes topics on understanding coumadin therapy, drug interaction considerations, vitamin K and coumadin use, interactions with foods and supplements containing vitamin K, and the use of herbal products. Mr. Chetna Manning verbalized his understanding of all instructions and will call the office with any questions, concerns, or signs of abnormal bleeding or blood clot. Notifications of recommendations will be sent to Dr. Stephie Boyer for review.     Thank you,   Lyndon Banks, [id number] Tracking Only    Intervention Detail: Adherence Monitorin and Lab(s) Ordered  Total # of Interventions Recommended: 2  Total # of Interventions Accepted: 2  Time Spent (min): 15

## 2023-02-16 NOTE — PATIENT INSTRUCTIONS
Today your INR was 4.0 . Your goal INR is  2.0-3.0 . You have a 5 mg tablet of Coumadin (warfarin). Take Coumadin (warfarin) as follows:    Hold warfarin dose today (2/16/23) and then take 5 mg (1 tablet) daily. Come back in 2 week(s) for your next finger stick/INR blood test.        Avoid any over the counter items containing aspirin or ibuprofen, and avoid great swings in general diet. Avoid alcohol consumption. Please notify the INR nurse if you are started on any new medication including over the counter or herbal supplements. Also, please notify your INR nurse if any of your other prescription or over the counter medications have been discontinued. Call [facility] at [phone] if you have any signs of abnormal bleeding/blood clot.  ------------------------------------------------------------------------------------------------------------------  Taking Warfarin Safely: Care Instructions    Your Care Instructions  Warfarin is a medicine that you take to prevent blood clots. It is often called a blood thinner. Doctors give warfarin (such as Coumadin) to reduce the risk of blood clots. You may be at risk for blood clots if you have atrial fibrillation or deep vein thrombosis. Some other health problems may also put you at risk. Warfarin slows the amount of time it takes for your blood to clot. It can cause bleeding problems. Even if you've been taking warfarin for a while, it's important to know how to take it safely. Foods and other medicines can affect the way warfarin works. Some can make warfarin work too well. This can cause bleeding problems. And some can make it work poorly, so that it does not prevent blood clots very well. You will need regular blood tests to check how long it takes for your blood to form a clot. This test is called a PT or prothrombin time test. The result of the test is called an INR level.  Depending on the test results, your doctor or anticoagulation clinic may adjust your dose of warfarin. Follow-up care is a key part of your treatment and safety. Be sure to make and go to all appointments, and call your doctor if you are having problems. It's also a good idea to know your test results and keep a list of the medicines you take. How can you care for yourself at home? Take warfarin safely  Take your warfarin at the same time each day. If you miss a dose of warfarin, don't take an extra dose to make up for it. Your doctor can tell you exactly what to do so you don't take too much or too little. Wear medical alert jewelry that lets others know that you take warfarin. You can buy this at most drugstores. Don't take warfarin if you are pregnant or planning to get pregnant. Talk to your doctor about how you can prevent getting pregnant while you are taking it. Don't change your dose or stop taking warfarin unless your doctor tells you to. Effects of medicines and food on warfarin  Don't start or stop taking any medicines, vitamins, or natural remedies unless you first talk to your doctor. Many medicines can affect how warfarin works. These include aspirin and other pain relievers, over-the-counter medicines, multivitamins, dietary supplements, and herbal products. Tell all of your doctors and pharmacists that you take warfarin. Some prescription medicines can affect how warfarin works. Keep the amount of vitamin K in your diet about the same from day to day. Do not suddenly eat a lot more or a lot less food that is rich in vitamin K than you usually do. Vitamin K affects how warfarin works and how your blood clots. Talk with your doctor before making big changes in your diet. Vitamin K is in many foods, such as:  Leafy greens, such as kale, cabbage, spinach, Swiss chard, and lettuce. Canola and soybean oils. Green vegetables, such as asparagus, broccoli, and [place] sprouts.   Vegetable drinks, green tea leaves, and some dietary supplement drinks. Avoid cranberry juice and other cranberry products. They can increase the effects of warfarin. Limit your use of alcohol. Avoid bleeding by preventing falls and injuries  Wear slippers or shoes with nonskid soles. Remove throw rugs and clutter. Rearrange furniture and electrical cords to keep them out of walking paths. Keep stairways, porches, and outside walkways well lit. Use night-lights in hallways and bathrooms. Be extra careful when you work with sharp tools or knives. When should you call for help? Call 911 anytime you think you may need emergency care. For example, call if:  You have a sudden, severe headache that is different from past headaches. Call your doctor now or seek immediate medical care if:  You have any abnormal bleeding, such as:  Nosebleeds. Vaginal bleeding that is different (heavier, more frequent, at a different time of the month) than what you are used to. Bloody or black stools, or rectal bleeding. Bloody or pink urine. Watch closely for changes in your health, and be sure to contact your doctor if you have any problems. Where can you learn more? Go to http://manuelito-mc.info/. Enter D627 in the search box to learn more about \"Taking Warfarin Safely: Care Instructions. \"  Current as of: January 27, 2016  Content Version: 11.1  © 8912-6881 [employer]. Care instructions adapted under license by [employer] (which disclaims liability or warranty for this information). If you have questions about a medical condition or this instruction, always ask your healthcare professional. [place] [address] any warranty or liability for your use of this information.

## 2023-03-02 ENCOUNTER — ANTI-COAG VISIT (OUTPATIENT)
Dept: PHARMACY | Age: 73
End: 2023-03-02
Payer: MEDICARE

## 2023-03-02 DIAGNOSIS — Z79.01 LONG TERM (CURRENT) USE OF ANTICOAGULANTS: ICD-10-CM

## 2023-03-02 DIAGNOSIS — I48.0 PAF (PAROXYSMAL ATRIAL FIBRILLATION) (HCC): Primary | ICD-10-CM

## 2023-03-02 LAB
INR BLD: 2.4
PT POC: 28.9 SECONDS
VALID INTERNAL CONTROL?: YES

## 2023-03-02 PROCEDURE — 99211 OFF/OP EST MAY X REQ PHY/QHP: CPT

## 2023-03-02 PROCEDURE — 85610 PROTHROMBIN TIME: CPT

## 2023-03-02 NOTE — PATIENT INSTRUCTIONS
Today your INR was 2.4 . Your goal INR is  2.0-3.0 . You have a 5 mg tablet of Coumadin (warfarin). Take Coumadin (warfarin) as follows: Take 5 mg (1 tablet) daily. Come back in 4 week(s) for your next finger stick/INR blood test.        Avoid any over the counter items containing aspirin or ibuprofen, and avoid great swings in general diet. Avoid alcohol consumption. Please notify the INR nurse if you are started on any new medication including over the counter or herbal supplements. Also, please notify your INR nurse if any of your other prescription or over the counter medications have been discontinued. Call [facility] at [phone] if you have any signs of abnormal bleeding/blood clot.  ------------------------------------------------------------------------------------------------------------------  Taking Warfarin Safely: Care Instructions    Your Care Instructions  Warfarin is a medicine that you take to prevent blood clots. It is often called a blood thinner. Doctors give warfarin (such as Coumadin) to reduce the risk of blood clots. You may be at risk for blood clots if you have atrial fibrillation or deep vein thrombosis. Some other health problems may also put you at risk. Warfarin slows the amount of time it takes for your blood to clot. It can cause bleeding problems. Even if you've been taking warfarin for a while, it's important to know how to take it safely. Foods and other medicines can affect the way warfarin works. Some can make warfarin work too well. This can cause bleeding problems. And some can make it work poorly, so that it does not prevent blood clots very well. You will need regular blood tests to check how long it takes for your blood to form a clot. This test is called a PT or prothrombin time test. The result of the test is called an INR level.  Depending on the test results, your doctor or anticoagulation clinic may adjust your dose of warfarin. Follow-up care is a key part of your treatment and safety. Be sure to make and go to all appointments, and call your doctor if you are having problems. It's also a good idea to know your test results and keep a list of the medicines you take. How can you care for yourself at home? Take warfarin safely  Take your warfarin at the same time each day. If you miss a dose of warfarin, don't take an extra dose to make up for it. Your doctor can tell you exactly what to do so you don't take too much or too little. Wear medical alert jewelry that lets others know that you take warfarin. You can buy this at most drugstores. Don't take warfarin if you are pregnant or planning to get pregnant. Talk to your doctor about how you can prevent getting pregnant while you are taking it. Don't change your dose or stop taking warfarin unless your doctor tells you to. Effects of medicines and food on warfarin  Don't start or stop taking any medicines, vitamins, or natural remedies unless you first talk to your doctor. Many medicines can affect how warfarin works. These include aspirin and other pain relievers, over-the-counter medicines, multivitamins, dietary supplements, and herbal products. Tell all of your doctors and pharmacists that you take warfarin. Some prescription medicines can affect how warfarin works. Keep the amount of vitamin K in your diet about the same from day to day. Do not suddenly eat a lot more or a lot less food that is rich in vitamin K than you usually do. Vitamin K affects how warfarin works and how your blood clots. Talk with your doctor before making big changes in your diet. Vitamin K is in many foods, such as:  Leafy greens, such as kale, cabbage, spinach, Swiss chard, and lettuce. Canola and soybean oils. Green vegetables, such as asparagus, broccoli, and [place] sprouts. Vegetable drinks, green tea leaves, and some dietary supplement drinks.   Avoid cranberry juice and other cranberry products. They can increase the effects of warfarin. Limit your use of alcohol. Avoid bleeding by preventing falls and injuries  Wear slippers or shoes with nonskid soles. Remove throw rugs and clutter. Rearrange furniture and electrical cords to keep them out of walking paths. Keep stairways, porches, and outside walkways well lit. Use night-lights in hallways and bathrooms. Be extra careful when you work with sharp tools or knives. When should you call for help? Call 911 anytime you think you may need emergency care. For example, call if:  You have a sudden, severe headache that is different from past headaches. Call your doctor now or seek immediate medical care if:  You have any abnormal bleeding, such as:  Nosebleeds. Vaginal bleeding that is different (heavier, more frequent, at a different time of the month) than what you are used to. Bloody or black stools, or rectal bleeding. Bloody or pink urine. Watch closely for changes in your health, and be sure to contact your doctor if you have any problems. Where can you learn more? Go to http://www.gray.com/. Enter T121 in the search box to learn more about \"Taking Warfarin Safely: Care Instructions. \"  Current as of: January 27, 2016  Content Version: 11.1  © 0999-5051 [employer]. Care instructions adapted under license by [employer] (which disclaims liability or warranty for this information). If you have questions about a medical condition or this instruction, always ask your healthcare professional. [place] [address] any warranty or liability for your use of this information.

## 2023-03-02 NOTE — PROGRESS NOTES
Pharmacy Progress Note - Anticoagulation Management    S/O:  Mr. Dane Castellanos  is a 67 y.o. male seen today for anticoagulation management for the diagnosis of Atrial Fibrillation. HPI: At last visit (2/16), INR was 4.0 and supratherapeutic. Patient denied extra doses of warfarin or changes to his medications. Patient reported consistent intake of vitamin K foods. Will reduce weekly dose from 37.5 mg to 35 mg (~ 7%) and patient will hold dose today (2/16) and then take 5 mg daily. Patient will recheck INR in 2 week(s). Interim History:    Warfarin start date: Prior to 3/6/20 per chart review. INR Goal:  2.0-3.0    Current warfarin regimen:  hold dose today (2/16) and then take 5 mg daily   Warfarin tablet strength:   5 mg   Duration of therapy: Indefinite    Today's pertinent positives includes:  Antibiotic use    Patient reports taking amoxicillin 500 mg po QID for 10 days (to be completed 3/3). Results for orders placed or performed in visit on 02/16/23   POC PROTHROMBIN W/INR   Result Value Ref Range    VALID INTERNAL CONTROL POC Yes     Prothrombin time (POC) 48.2 seconds    INR POC 4.0        Adherence:   Able to recall regimen? YES  Miss/extra dose? NO  Need refill?  NO      Upcoming procedure(s):  N/A      Past Medical History:   Diagnosis Date    Atrial fibrillation (HCC)     CAD (coronary artery disease)     Chronic systolic HF (heart failure) (HCC)     Congestive heart failure (HCC)     DM type 2 (diabetes mellitus, type 2) (HCC)     Gout     HTN (hypertension)     Hypercholesterolemia     ICD (implantable cardioverter-defibrillator) in place     Long term current use of anticoagulant therapy     ASA & Warfarin    Nonischemic cardiomyopathy ([address] 2/8/2019    S/P cardiac cath 2/8/2019 2/8/19 cardiac cath with nonobstructive disease    Stroke [facility])     tia IN 2019    Syncope      No Known Allergies  Current Outpatient Medications   Medication Sig    warfarin (COUMADIN) 5 mg tablet Take 1.5 tablets (7.5 mg) by mouth every Saturday. Take 1 tablet (5 mg) every Sunday, Monday, Tuesday, Wednesday, Thursday and Friday. Or take as directed by the clinic.    dapagliflozin (Farxiga) 5 mg tab tablet Take 5 mg by mouth in the morning. acetaminophen (TYLENOL) 500 mg tablet Take 1,000 mg by mouth daily as needed for Pain. sacubitriL-valsartan (Entresto) 49-51 mg tab tablet Take 1 Tablet by mouth two (2) times a day. metFORMIN ER (GLUCOPHAGE XR) 500 mg tablet Take  by mouth. 3 tabs at night    atorvastatin (LIPITOR) 10 mg tablet TAKE 1 TABLET EVERY DAY    ACCU-CHEK DAVE PLUS METER misc USE TO TEST BLOOD SUGAR    multivitamin (ONE A DAY) tablet Take 1 Tab by mouth daily. aspirin delayed-release 81 mg tablet Take 81 mg by mouth daily. carvedilol (COREG) 25 mg tablet Take 25 mg by mouth two (2) times daily (with meals). potassium chloride (KLOR-CON M10) 10 mEq tablet Take 10 mEq by mouth two (2) times a day. furosemide (LASIX) 20 mg tablet Take 20 mg by mouth daily. amLODIPine (NORVASC) 5 mg tablet Take 5 mg by mouth daily. No current facility-administered medications for this visit.      Wt Readings from Last 3 Encounters:   05/12/22 218 lb (98.9 kg)   04/12/22 218 lb 9.6 oz (99.2 kg)   04/05/22 217 lb (98.4 kg)     BP Readings from Last 3 Encounters:   05/12/22 113/84   04/12/22 120/70   04/05/22 101/66     Pulse Readings from Last 3 Encounters:   05/12/22 69   04/12/22 70   04/05/22 75     Lab Results   Component Value Date/Time    WBC 4.7 04/05/2022 11:07 AM    HGB 16.7 04/05/2022 11:07 AM    HCT 49.0 04/05/2022 11:07 AM    PLATELET 108 (L) 74/14/9083 11:07 AM    MCV 84.5 04/05/2022 11:07 AM     Lab Results   Component Value Date/Time    Sodium 141 04/05/2022 11:07 AM    Potassium 3.8 04/05/2022 11:07 AM    Chloride 111 (H) 04/05/2022 11:07 AM    CO2 23 04/05/2022 11:07 AM    Anion gap 7 04/05/2022 11:07 AM    Glucose 168 (H) 04/05/2022 11:07 AM    BUN 9 04/05/2022 11:07 AM Creatinine 1.06 04/05/2022 11:07 AM    BUN/Creatinine ratio 8 (L) 04/05/2022 11:07 AM    GFR est AA >60 04/05/2022 11:07 AM    GFR est non-AA >60 04/05/2022 11:07 AM    Calcium 8.9 04/05/2022 11:07 AM    Bilirubin, total 0.4 01/19/2022 03:35 PM    Alk. phosphatase 94 01/19/2022 03:35 PM    Protein, total 7.1 01/19/2022 03:35 PM    Albumin 3.5 01/19/2022 03:35 PM    Globulin 3.6 01/19/2022 03:35 PM    A-G Ratio 1.0 (L) 01/19/2022 03:35 PM    ALT (SGPT) 32 01/19/2022 03:35 PM     CrCl cannot be calculated (Patient's most recent lab result is older than the maximum 180 days allowed.). INR History:   (normal INR range 0.8-1.2)     Date   INR   PT   Dose/Comments  03/02/23 2.4  28.9 hold dose 2/16 then take 5 mg daily  02/16/23 4.0  48.2 7.5 mg Sat; 5 mg daily ROW  02/01/23 3.8  45.9 7.5 mg Sat; 5 mg daily ROW  01/04/23 2.7  32.1 7.5 mg Sat; 5 mg daily ROW  12/01/22 3.0  35.8 7.5 mg Sat; 5 mg daily ROW   11/16/22 4.4  52.4 hold doses 11/2, 11/3 and then resume 7.5 mg Sat; 5 mg daily ROW  11/02/22 5.6  66.6 7.5 mg Sat; 5 mg daily ROW  10/05/22 2.7  32.5 7.5 mg Sat; 5 mg daily ROW  09/22/22 3.1  37.2 7.5 mg Sat; 5 mg daily ROW  09/08/22 2.1  25.1 7.5 mg Sat; 5 mg daily ROW   08/31/22 1.2  14.2 5 mg 8/25 then hold for 5 days (8/26-8/30)  08/25/22 2.5  30.0 hold doses 8/16, 8/17 then 7.5 mg Sat; 5 mg daily ROW   08/16/22 4.2  50.0 7.5 mg Fri, Sat; 5 mg daily ROW  07/25/22 3.4  41.2 7.5 mg Fri, Sat; 5 mg daily ROW  07/05/22 3.0  35.5 hold dose 6/20 then resume 7.5 mg Fri, Sat; 5 mg daily ROW      Medications that can increase  INR: multivitamin, glimepiride  Medications that can decrease INR: metformin    A/P:       Anticoagulation:  Considering Mr. Kishan Marin past history, todays findings, and per Anticoagulation Collaborative Practice Agreement/Protocol:    Fingerstick POC INR (2.4) is Therapeutic for INR goal today. Continue warfarin 5 mg daily   INR is 2.4 and therapeutic.  Patient reports consistent intake of vitamin K foods. Patient reports taking amoxicillin 500 mg po QID for 10 days (to be completed 3/3). Patient will continue current regimen. Patient requested to recheck INR in 4 week(s). Patient was instructed to schedule an appointment in 4 week(s) prior to leaving the clinic. Medication reconciliation was completed during the visit. There are no discontinued medications. A full discussion of the nature of anticoagulants has been carried out. A full discussion of the need for frequent and regular monitoring, precise dosage adjustment and compliance was stressed. Side effects of potential bleeding were discussed and Mr. Rivero was instructed to call [phone] if there are any signs of abnormal bleeding. Mr. Valerie Hassan was instructed to avoid any OTC items containing aspirin or ibuprofen and prior to starting any new OTC products to consult with his physician or pharmacist to ensure no drug interactions are present. Mr. Valerie Hassan was instructed to avoid any major changes in his general diet and to avoid alcohol consumption. Mr. Valerie Hassan was provided information in the AVS that includes topics on understanding coumadin therapy, drug interaction considerations, vitamin K and coumadin use, interactions with foods and supplements containing vitamin K, and the use of herbal products. Mr. Valerie Hassan verbalized his understanding of all instructions and will call the office with any questions, concerns, or signs of abnormal bleeding or blood clot. Notifications of recommendations will be sent to Dr. David Garner for review.     Thank you,   Will Machado, [address] Tracking Only    Intervention Detail: Adherence Monitorin and Lab(s) Ordered  Total # of Interventions Recommended: 2  Total # of Interventions Accepted: 2  Time Spent (min): 15

## 2023-03-29 ENCOUNTER — ANTI-COAG VISIT (OUTPATIENT)
Dept: PHARMACY | Age: 73
End: 2023-03-29
Payer: MEDICARE

## 2023-03-29 DIAGNOSIS — Z79.01 LONG TERM (CURRENT) USE OF ANTICOAGULANTS: ICD-10-CM

## 2023-03-29 DIAGNOSIS — I48.0 PAF (PAROXYSMAL ATRIAL FIBRILLATION) (HCC): Primary | ICD-10-CM

## 2023-03-29 LAB
INR BLD: 2.1
PT POC: 25.3 SECONDS
VALID INTERNAL CONTROL?: YES

## 2023-03-29 PROCEDURE — 99211 OFF/OP EST MAY X REQ PHY/QHP: CPT

## 2023-03-29 PROCEDURE — 85610 PROTHROMBIN TIME: CPT

## 2023-03-29 NOTE — PROGRESS NOTES
Pharmacy Progress Note - Anticoagulation Management    S/O:  Mr. Barlow.  is a 67 y.o. male seen today for anticoagulation management for the diagnosis of Atrial Fibrillation. HPI: At last visit (3/2), INR was 2.4 and therapeutic. Patient reported consistent intake of vitamin K foods. Patient reported taking amoxicillin 500 mg po QID for 10 days (to be completed 3/3). Patient will continue current regimen. Patient requested to recheck INR in 4 week(s). Interim History:    Warfarin start date: Prior to 3/6/20 per chart review. INR Goal:  2.0-3.0    Current warfarin regimen:  5 mg daily   Warfarin tablet strength:   5 mg   Duration of therapy: Indefinite      Results for orders placed or performed in visit on 03/29/23   POC PROTHROMBIN W/INR   Result Value Ref Range    VALID INTERNAL CONTROL POC Yes     Prothrombin time (POC) 25.3 seconds    INR POC 2.1        Today's pertinent positives includes:  No significant changes since last visit      Adherence:   Able to recall regimen? YES  Miss/extra dose? NO  Need refill? NO      Upcoming procedure(s):  N/A      Past Medical History:   Diagnosis Date    Atrial fibrillation (HCC)     CAD (coronary artery disease)     Chronic systolic HF (heart failure) (HCC)     Congestive heart failure (HCC)     DM type 2 (diabetes mellitus, type 2) (HCC)     Gout     HTN (hypertension)     Hypercholesterolemia     ICD (implantable cardioverter-defibrillator) in place     Long term current use of anticoagulant therapy     ASA & Warfarin    Nonischemic cardiomyopathy ([facility] [address] 2/8/2019    S/P cardiac cath 2/8/2019 2/8/19 cardiac cath with nonobstructive disease    Stroke [facility])     tia IN 2019    Syncope      No Known Allergies  Current Outpatient Medications   Medication Sig    warfarin (COUMADIN) 5 mg tablet Take 1.5 tablets (7.5 mg) by mouth every Saturday. Take 1 tablet (5 mg) every Sunday, Monday, Tuesday, Wednesday, Thursday and Friday.  Or take as directed by the clinic.    dapagliflozin (Farxiga) 5 mg tab tablet Take 5 mg by mouth in the morning. acetaminophen (TYLENOL) 500 mg tablet Take 1,000 mg by mouth daily as needed for Pain. sacubitriL-valsartan (Entresto) 49-51 mg tab tablet Take 1 Tablet by mouth two (2) times a day. metFORMIN ER (GLUCOPHAGE XR) 500 mg tablet Take  by mouth. 3 tabs at night    atorvastatin (LIPITOR) 10 mg tablet TAKE 1 TABLET EVERY DAY    ACCU-CHEK DAVE PLUS METER misc USE TO TEST BLOOD SUGAR    multivitamin (ONE A DAY) tablet Take 1 Tab by mouth daily. aspirin delayed-release 81 mg tablet Take 81 mg by mouth daily. carvedilol (COREG) 25 mg tablet Take 25 mg by mouth two (2) times daily (with meals). potassium chloride (KLOR-CON M10) 10 mEq tablet Take 10 mEq by mouth two (2) times a day. furosemide (LASIX) 20 mg tablet Take 20 mg by mouth daily. amLODIPine (NORVASC) 5 mg tablet Take 5 mg by mouth daily. No current facility-administered medications for this visit.      Wt Readings from Last 3 Encounters:   05/12/22 218 lb (98.9 kg)   04/12/22 218 lb 9.6 oz (99.2 kg)   04/05/22 217 lb (98.4 kg)     BP Readings from Last 3 Encounters:   05/12/22 113/84   04/12/22 120/70   04/05/22 101/66     Pulse Readings from Last 3 Encounters:   05/12/22 69   04/12/22 70   04/05/22 75     Lab Results   Component Value Date/Time    WBC 4.7 04/05/2022 11:07 AM    HGB 16.7 04/05/2022 11:07 AM    HCT 49.0 04/05/2022 11:07 AM    PLATELET 440 (L) 85/22/1310 11:07 AM    MCV 84.5 04/05/2022 11:07 AM     Lab Results   Component Value Date/Time    Sodium 141 04/05/2022 11:07 AM    Potassium 3.8 04/05/2022 11:07 AM    Chloride 111 (H) 04/05/2022 11:07 AM    CO2 23 04/05/2022 11:07 AM    Anion gap 7 04/05/2022 11:07 AM    Glucose 168 (H) 04/05/2022 11:07 AM    BUN 9 04/05/2022 11:07 AM    Creatinine 1.06 04/05/2022 11:07 AM    BUN/Creatinine ratio 8 (L) 04/05/2022 11:07 AM    GFR est AA >60 04/05/2022 11:07 AM    GFR est non-AA >60 04/05/2022 11:07 AM    Calcium 8.9 04/05/2022 11:07 AM    Bilirubin, total 0.4 01/19/2022 03:35 PM    Alk. phosphatase 94 01/19/2022 03:35 PM    Protein, total 7.1 01/19/2022 03:35 PM    Albumin 3.5 01/19/2022 03:35 PM    Globulin 3.6 01/19/2022 03:35 PM    A-G Ratio 1.0 (L) 01/19/2022 03:35 PM    ALT (SGPT) 32 01/19/2022 03:35 PM     CrCl cannot be calculated (Patient's most recent lab result is older than the maximum 180 days allowed.). INR History:   (normal INR range 0.8-1.2)     Date   INR   PT   Dose/Comments  03/29/23 2.1  25.3 5 mg daily  03/02/23 2.4  28.9 hold dose 2/16 then take 5 mg daily  02/16/23 4.0  48.2 7.5 mg Sat; 5 mg daily ROW  02/01/23 3.8  45.9 7.5 mg Sat; 5 mg daily ROW  01/04/23 2.7  32.1 7.5 mg Sat; 5 mg daily ROW  12/01/22 3.0  35.8 7.5 mg Sat; 5 mg daily ROW   11/16/22 4.4  52.4 hold doses 11/2, 11/3 and then resume 7.5 mg Sat; 5 mg daily ROW  11/02/22 5.6  66.6 7.5 mg Sat; 5 mg daily ROW  10/05/22 2.7  32.5 7.5 mg Sat; 5 mg daily ROW  09/22/22 3.1  37.2 7.5 mg Sat; 5 mg daily ROW  09/08/22 2.1  25.1 7.5 mg Sat; 5 mg daily ROW   08/31/22 1.2  14.2 5 mg 8/25 then hold for 5 days (8/26-8/30)  08/25/22 2.5  30.0 hold doses 8/16, 8/17 then 7.5 mg Sat; 5 mg daily ROW   08/16/22 4.2  50.0 7.5 mg Fri, Sat; 5 mg daily ROW  07/25/22 3.4  41.2 7.5 mg Fri, Sat; 5 mg daily ROW  07/05/22 3.0  35.5 hold dose 6/20 then resume 7.5 mg Fri, Sat; 5 mg daily ROW      Medications that can increase  INR: multivitamin, glimepiride  Medications that can decrease INR: metformin    A/P:       Anticoagulation:  Considering Mr. Mireya Be past history, todays findings, and per Anticoagulation Collaborative Practice Agreement/Protocol:    Fingerstick POC INR (2.1) is Therapeutic for INR goal today. Continue warfarin 5 mg daily   INR is 2.1 and therapeutic. Patient denies changes to his medications and reports consistent intake of vitamin K foods. Patient will continue current regimen and recheck INR in 4 week(s).          Patient was instructed to schedule an appointment in 4 week(s) prior to leaving the clinic. Medication reconciliation was completed during the visit. There are no discontinued medications. A full discussion of the nature of anticoagulants has been carried out. A full discussion of the need for frequent and regular monitoring, precise dosage adjustment and compliance was stressed. Side effects of potential bleeding were discussed and Mr. Rivero was instructed to call [phone] if there are any signs of abnormal bleeding. Mr. Shayan Mullen was instructed to avoid any OTC items containing aspirin or ibuprofen and prior to starting any new OTC products to consult with his physician or pharmacist to ensure no drug interactions are present. Mr. Shayan Mullen was instructed to avoid any major changes in his general diet and to avoid alcohol consumption. Mr. Shayan Mullen was provided information in the AVS that includes topics on understanding coumadin therapy, drug interaction considerations, vitamin K and coumadin use, interactions with foods and supplements containing vitamin K, and the use of herbal products. Mr. Shayan Mullen verbalized his understanding of all instructions and will call the office with any questions, concerns, or signs of abnormal bleeding or blood clot. Notifications of recommendations will be sent to Dr. Saurav Rizo for review.     Thank you,   Inocente Joy, [address] Tracking Only    Intervention Detail: Adherence Monitorin and Lab(s) Ordered  Total # of Interventions Recommended: 2  Total # of Interventions Accepted: 2  Time Spent (min): 15

## 2023-03-29 NOTE — PATIENT INSTRUCTIONS
Today your INR was 2.1 . Your goal INR is  2.0-3.0 . You have a 5 mg tablet of Coumadin (warfarin). Take Coumadin (warfarin) as follows: Take 5 mg (1 tablet) daily. Come back in 4 week(s) for your next finger stick/INR blood test.        Avoid any over the counter items containing aspirin or ibuprofen, and avoid great swings in general diet. Avoid alcohol consumption. Please notify the INR pharmacist if you are started on any new medication including over the counter or herbal supplements. Also, please notify your INR pharmacist if any of your other prescription or over the counter medications have been discontinued. Call [facility] at [phone] if you have any signs of abnormal bleeding/blood clot.  ------------------------------------------------------------------------------------------------------------------  Taking Warfarin Safely: Care Instructions    Your Care Instructions  Warfarin is a medicine that you take to prevent blood clots. It is often called a blood thinner. Doctors give warfarin (such as Coumadin) to reduce the risk of blood clots. You may be at risk for blood clots if you have atrial fibrillation or deep vein thrombosis. Some other health problems may also put you at risk. Warfarin slows the amount of time it takes for your blood to clot. It can cause bleeding problems. Even if you've been taking warfarin for a while, it's important to know how to take it safely. Foods and other medicines can affect the way warfarin works. Some can make warfarin work too well. This can cause bleeding problems. And some can make it work poorly, so that it does not prevent blood clots very well. You will need regular blood tests to check how long it takes for your blood to form a clot. This test is called a PT or prothrombin time test. The result of the test is called an INR level.  Depending on the test results, your doctor or anticoagulation clinic may adjust your dose of warfarin. Follow-up care is a key part of your treatment and safety. Be sure to make and go to all appointments, and call your doctor if you are having problems. It's also a good idea to know your test results and keep a list of the medicines you take. How can you care for yourself at home? Take warfarin safely  Take your warfarin at the same time each day. If you miss a dose of warfarin, don't take an extra dose to make up for it. Your doctor can tell you exactly what to do so you don't take too much or too little. Wear medical alert jewelry that lets others know that you take warfarin. You can buy this at most drugstores. Don't take warfarin if you are pregnant or planning to get pregnant. Talk to your doctor about how you can prevent getting pregnant while you are taking it. Don't change your dose or stop taking warfarin unless your doctor tells you to. Effects of medicines and food on warfarin  Don't start or stop taking any medicines, vitamins, or natural remedies unless you first talk to your doctor. Many medicines can affect how warfarin works. These include aspirin and other pain relievers, over-the-counter medicines, multivitamins, dietary supplements, and herbal products. Tell all of your doctors and pharmacists that you take warfarin. Some prescription medicines can affect how warfarin works. Keep the amount of vitamin K in your diet about the same from day to day. Do not suddenly eat a lot more or a lot less food that is rich in vitamin K than you usually do. Vitamin K affects how warfarin works and how your blood clots. Talk with your doctor before making big changes in your diet. Vitamin K is in many foods, such as:  Leafy greens, such as kale, cabbage, spinach, Swiss chard, and lettuce. Canola and soybean oils. Green vegetables, such as asparagus, broccoli, and [place] sprouts. Vegetable drinks, green tea leaves, and some dietary supplement drinks.   Avoid cranberry juice and other cranberry products. They can increase the effects of warfarin. Limit your use of alcohol. Avoid bleeding by preventing falls and injuries  Wear slippers or shoes with nonskid soles. Remove throw rugs and clutter. Rearrange furniture and electrical cords to keep them out of walking paths. Keep stairways, porches, and outside walkways well lit. Use night-lights in hallways and bathrooms. Be extra careful when you work with sharp tools or knives. When should you call for help? Call 911 anytime you think you may need emergency care. For example, call if:  You have a sudden, severe headache that is different from past headaches. Call your doctor now or seek immediate medical care if:  You have any abnormal bleeding, such as:  Nosebleeds. Vaginal bleeding that is different (heavier, more frequent, at a different time of the month) than what you are used to. Bloody or black stools, or rectal bleeding. Bloody or pink urine. Watch closely for changes in your health, and be sure to contact your doctor if you have any problems. Where can you learn more? Go to http://www.gray.com/. Enter D733 in the search box to learn more about \"Taking Warfarin Safely: Care Instructions. \"  Current as of: January 27, 2016  Content Version: 11.1  © 1891-8659 [employer]. Care instructions adapted under license by [employer] (which disclaims liability or warranty for this information). If you have questions about a medical condition or this instruction, always ask your healthcare professional. [place] [address] any warranty or liability for your use of this information.

## 2023-04-26 ENCOUNTER — ANTI-COAG VISIT (OUTPATIENT)
Dept: PHARMACY | Age: 73
End: 2023-04-26
Payer: MEDICARE

## 2023-04-26 DIAGNOSIS — Z79.01 LONG TERM (CURRENT) USE OF ANTICOAGULANTS: ICD-10-CM

## 2023-04-26 DIAGNOSIS — I48.0 PAF (PAROXYSMAL ATRIAL FIBRILLATION) (HCC): Primary | ICD-10-CM

## 2023-04-26 LAB
INR BLD: 2.3
PT POC: 27.2 SECONDS
VALID INTERNAL CONTROL?: YES

## 2023-04-26 PROCEDURE — 99211 OFF/OP EST MAY X REQ PHY/QHP: CPT

## 2023-04-26 PROCEDURE — 85610 PROTHROMBIN TIME: CPT

## 2023-04-26 NOTE — PROGRESS NOTES
Pharmacy Progress Note - Anticoagulation Management    S/O:  Mr. Thania Ortega  is a 67 y.o. male seen today for anticoagulation management for the diagnosis of Atrial Fibrillation. HPI: At last visit (3/29), INR was 2.1 and therapeutic. Patient denied changes to his medications and reported consistent intake of vitamin K foods. Patient will continue current regimen and recheck INR in 4 week(s). Interim History:    Warfarin start date: Prior to 3/6/20 per chart review. INR Goal:  2.0-3.0    Current warfarin regimen:  5 mg daily   Warfarin tablet strength:   5 mg   Duration of therapy: Indefinite      Results for orders placed or performed in visit on 04/26/23   POC PROTHROMBIN W/INR   Result Value Ref Range    VALID INTERNAL CONTROL POC Yes     Prothrombin time (POC) 27.2 seconds    INR POC 2.3        Today's pertinent positives includes:  No significant changes since last visit      Adherence:   Able to recall regimen? YES  Miss/extra dose? NO  Need refill? NO      Upcoming procedure(s):  N/A      Past Medical History:   Diagnosis Date    Atrial fibrillation (HCC)     CAD (coronary artery disease)     Chronic systolic HF (heart failure) (HCC)     Congestive heart failure (HCC)     DM type 2 (diabetes mellitus, type 2) (HCC)     Gout     HTN (hypertension)     Hypercholesterolemia     ICD (implantable cardioverter-defibrillator) in place     Long term current use of anticoagulant therapy     ASA & Warfarin    Nonischemic cardiomyopathy ([facility] [address] 2/8/2019    S/P cardiac cath 2/8/2019 2/8/19 cardiac cath with nonobstructive disease    Stroke [facility])     tia IN 2019    Syncope      No Known Allergies  Current Outpatient Medications   Medication Sig    warfarin (COUMADIN) 5 mg tablet Take 1.5 tablets (7.5 mg) by mouth every Saturday. Take 1 tablet (5 mg) every Sunday, Monday, Tuesday, Wednesday, Thursday and Friday.  Or take as directed by the clinic.    dapagliflozin (Farxiga) 5 mg tab tablet Take 5 mg by mouth in the morning. acetaminophen (TYLENOL) 500 mg tablet Take 1,000 mg by mouth daily as needed for Pain. sacubitriL-valsartan (Entresto) 49-51 mg tab tablet Take 1 Tablet by mouth two (2) times a day. metFORMIN ER (GLUCOPHAGE XR) 500 mg tablet Take  by mouth. 3 tabs at night    atorvastatin (LIPITOR) 10 mg tablet TAKE 1 TABLET EVERY DAY    ACCU-CHEK DAVE PLUS METER misc USE TO TEST BLOOD SUGAR    multivitamin (ONE A DAY) tablet Take 1 Tab by mouth daily. aspirin delayed-release 81 mg tablet Take 81 mg by mouth daily. carvedilol (COREG) 25 mg tablet Take 25 mg by mouth two (2) times daily (with meals). potassium chloride (KLOR-CON M10) 10 mEq tablet Take 10 mEq by mouth two (2) times a day. furosemide (LASIX) 20 mg tablet Take 20 mg by mouth daily. amLODIPine (NORVASC) 5 mg tablet Take 5 mg by mouth daily. No current facility-administered medications for this visit.      Wt Readings from Last 3 Encounters:   05/12/22 218 lb (98.9 kg)   04/12/22 218 lb 9.6 oz (99.2 kg)   04/05/22 217 lb (98.4 kg)     BP Readings from Last 3 Encounters:   05/12/22 113/84   04/12/22 120/70   04/05/22 101/66     Pulse Readings from Last 3 Encounters:   05/12/22 69   04/12/22 70   04/05/22 75     Lab Results   Component Value Date/Time    WBC 4.7 04/05/2022 11:07 AM    HGB 16.7 04/05/2022 11:07 AM    HCT 49.0 04/05/2022 11:07 AM    PLATELET 321 (L) 07/57/4389 11:07 AM    MCV 84.5 04/05/2022 11:07 AM     Lab Results   Component Value Date/Time    Sodium 141 04/05/2022 11:07 AM    Potassium 3.8 04/05/2022 11:07 AM    Chloride 111 (H) 04/05/2022 11:07 AM    CO2 23 04/05/2022 11:07 AM    Anion gap 7 04/05/2022 11:07 AM    Glucose 168 (H) 04/05/2022 11:07 AM    BUN 9 04/05/2022 11:07 AM    Creatinine 1.06 04/05/2022 11:07 AM    BUN/Creatinine ratio 8 (L) 04/05/2022 11:07 AM    GFR est AA >60 04/05/2022 11:07 AM    GFR est non-AA >60 04/05/2022 11:07 AM    Calcium 8.9 04/05/2022 11:07 AM    Bilirubin, total 0.4 01/19/2022 03:35 PM    Alk. phosphatase 94 01/19/2022 03:35 PM    Protein, total 7.1 01/19/2022 03:35 PM    Albumin 3.5 01/19/2022 03:35 PM    Globulin 3.6 01/19/2022 03:35 PM    A-G Ratio 1.0 (L) 01/19/2022 03:35 PM    ALT (SGPT) 32 01/19/2022 03:35 PM     CrCl cannot be calculated (Patient's most recent lab result is older than the maximum 180 days allowed.). INR History:   (normal INR range 0.8-1.2)     Date   INR   PT   Dose/Comments  04/26/23 2.3  27.2 5 mg daily  03/29/23 2.1  25.3 5 mg daily  03/02/23 2.4  28.9 hold dose 2/16 then take 5 mg daily  02/16/23 4.0  48.2 7.5 mg Sat; 5 mg daily ROW  02/01/23 3.8  45.9 7.5 mg Sat; 5 mg daily ROW  01/04/23 2.7  32.1 7.5 mg Sat; 5 mg daily ROW  12/01/22 3.0  35.8 7.5 mg Sat; 5 mg daily ROW   11/16/22 4.4  52.4 hold doses 11/2, 11/3 and then resume 7.5 mg Sat; 5 mg daily ROW  11/02/22 5.6  66.6 7.5 mg Sat; 5 mg daily ROW  10/05/22 2.7  32.5 7.5 mg Sat; 5 mg daily ROW  09/22/22 3.1  37.2 7.5 mg Sat; 5 mg daily ROW  09/08/22 2.1  25.1 7.5 mg Sat; 5 mg daily ROW   08/31/22 1.2  14.2 5 mg 8/25 then hold for 5 days (8/26-8/30)  08/25/22 2.5  30.0 hold doses 8/16, 8/17 then 7.5 mg Sat; 5 mg daily ROW   08/16/22 4.2  50.0 7.5 mg Fri, Sat; 5 mg daily ROW  07/25/22 3.4  41.2 7.5 mg Fri, Sat; 5 mg daily ROW  07/05/22 3.0  35.5 hold dose 6/20 then resume 7.5 mg Fri, Sat; 5 mg daily ROW      Medications that can increase  INR: multivitamin, glimepiride  Medications that can decrease INR: metformin    A/P:       Anticoagulation:  Considering Mr. Casimiro Rosario past history, todays findings, and per Anticoagulation Collaborative Practice Agreement/Protocol:    Fingerstick POC INR (2.3) is Therapeutic for INR goal today. Continue warfarin 5 mg daily   INR is 2.3 and therapeutic. Patient denies changes to his medications and reports consistent intake of vitamin K foods. Patient will continue current regimen and recheck INR in 4 week(s).          Patient was instructed to schedule an appointment in 4 week(s) prior to leaving the clinic. Medication reconciliation was completed during the visit. There are no discontinued medications. A full discussion of the nature of anticoagulants has been carried out. A full discussion of the need for frequent and regular monitoring, precise dosage adjustment and compliance was stressed. Side effects of potential bleeding were discussed and Mr. Rivero was instructed to call [phone] if there are any signs of abnormal bleeding. Mr. Elmer Hargrove was instructed to avoid any OTC items containing aspirin or ibuprofen and prior to starting any new OTC products to consult with his physician or pharmacist to ensure no drug interactions are present. Mr. Elmer Hargrove was instructed to avoid any major changes in his general diet and to avoid alcohol consumption. Mr. Elmer Hargrove was provided information in the AVS that includes topics on understanding coumadin therapy, drug interaction considerations, vitamin K and coumadin use, interactions with foods and supplements containing vitamin K, and the use of herbal products. Mr. Elmer Hargrove verbalized his understanding of all instructions and will call the office with any questions, concerns, or signs of abnormal bleeding or blood clot. Notifications of recommendations will be sent to Dr. Milli Wu for review.     Thank you,   Elia Alvarado, [address] Tracking Only    Intervention Detail: Adherence Monitorin and Lab(s) Ordered  Total # of Interventions Recommended: 2  Total # of Interventions Accepted: 2  Time Spent (min): 15

## 2023-04-26 NOTE — PATIENT INSTRUCTIONS
Today your INR was 2.3 . Your goal INR is  2.0-3.0 . You have a 5 mg tablet of Coumadin (warfarin). Take Coumadin (warfarin) as follows: Take 5 mg (1 tablet) daily. Come back in 4 week(s) for your next finger stick/INR blood test.        Avoid any over the counter items containing aspirin or ibuprofen, and avoid great swings in general diet. Avoid alcohol consumption. Please notify the INR pharmacist if you are started on any new medication including over the counter or herbal supplements. Also, please notify your INR pharmacist if any of your other prescription or over the counter medications have been discontinued. Call [facility] at [phone] if you have any signs of abnormal bleeding/blood clot.  ------------------------------------------------------------------------------------------------------------------  Taking Warfarin Safely: Care Instructions    Your Care Instructions  Warfarin is a medicine that you take to prevent blood clots. It is often called a blood thinner. Doctors give warfarin (such as Coumadin) to reduce the risk of blood clots. You may be at risk for blood clots if you have atrial fibrillation or deep vein thrombosis. Some other health problems may also put you at risk. Warfarin slows the amount of time it takes for your blood to clot. It can cause bleeding problems. Even if you've been taking warfarin for a while, it's important to know how to take it safely. Foods and other medicines can affect the way warfarin works. Some can make warfarin work too well. This can cause bleeding problems. And some can make it work poorly, so that it does not prevent blood clots very well. You will need regular blood tests to check how long it takes for your blood to form a clot. This test is called a PT or prothrombin time test. The result of the test is called an INR level.  Depending on the test results, your doctor or anticoagulation clinic may adjust your dose of warfarin. Follow-up care is a key part of your treatment and safety. Be sure to make and go to all appointments, and call your doctor if you are having problems. It's also a good idea to know your test results and keep a list of the medicines you take. How can you care for yourself at home? Take warfarin safely  Take your warfarin at the same time each day. If you miss a dose of warfarin, don't take an extra dose to make up for it. Your doctor can tell you exactly what to do so you don't take too much or too little. Wear medical alert jewelry that lets others know that you take warfarin. You can buy this at most drugstores. Don't take warfarin if you are pregnant or planning to get pregnant. Talk to your doctor about how you can prevent getting pregnant while you are taking it. Don't change your dose or stop taking warfarin unless your doctor tells you to. Effects of medicines and food on warfarin  Don't start or stop taking any medicines, vitamins, or natural remedies unless you first talk to your doctor. Many medicines can affect how warfarin works. These include aspirin and other pain relievers, over-the-counter medicines, multivitamins, dietary supplements, and herbal products. Tell all of your doctors and pharmacists that you take warfarin. Some prescription medicines can affect how warfarin works. Keep the amount of vitamin K in your diet about the same from day to day. Do not suddenly eat a lot more or a lot less food that is rich in vitamin K than you usually do. Vitamin K affects how warfarin works and how your blood clots. Talk with your doctor before making big changes in your diet. Vitamin K is in many foods, such as:  Leafy greens, such as kale, cabbage, spinach, Swiss chard, and lettuce. Canola and soybean oils. Green vegetables, such as asparagus, broccoli, and [place] sprouts.   Vegetable drinks, green tea leaves, and some dietary supplement drinks. Avoid cranberry juice and other cranberry products. They can increase the effects of warfarin. Limit your use of alcohol. Avoid bleeding by preventing falls and injuries  Wear slippers or shoes with nonskid soles. Remove throw rugs and clutter. Rearrange furniture and electrical cords to keep them out of walking paths. Keep stairways, porches, and outside walkways well lit. Use night-lights in hallways and bathrooms. Be extra careful when you work with sharp tools or knives. When should you call for help? Call 911 anytime you think you may need emergency care. For example, call if:  You have a sudden, severe headache that is different from past headaches. Call your doctor now or seek immediate medical care if:  You have any abnormal bleeding, such as:  Nosebleeds. Vaginal bleeding that is different (heavier, more frequent, at a different time of the month) than what you are used to. Bloody or black stools, or rectal bleeding. Bloody or pink urine. Watch closely for changes in your health, and be sure to contact your doctor if you have any problems. Where can you learn more? Go to http://manuelito-mc.info/. Enter X320 in the search box to learn more about \"Taking Warfarin Safely: Care Instructions. \"  Current as of: January 27, 2016  Content Version: 11.1  © 2999-7530 [employer]. Care instructions adapted under license by [employer] (which disclaims liability or warranty for this information). If you have questions about a medical condition or this instruction, always ask your healthcare professional. [place] [address] any warranty or liability for your use of this information.

## 2023-05-24 ENCOUNTER — ANTI-COAG VISIT (OUTPATIENT)
Facility: HOSPITAL | Age: 73
End: 2023-05-24

## 2023-05-24 DIAGNOSIS — I48.0 PAROXYSMAL ATRIAL FIBRILLATION (HCC): Primary | ICD-10-CM

## 2023-05-24 DIAGNOSIS — Z79.01 LONG TERM (CURRENT) USE OF ANTICOAGULANTS: ICD-10-CM

## 2023-05-24 LAB — INTERNATIONAL NORMALIZATION RATIO, POC: 2.2 (ref 2–3)

## 2023-05-24 NOTE — PROGRESS NOTES
Pharmacy Progress Note - Anticoagulation Management    S/O:  Mr. Deidre Knight  is a 67 y.o. male seen today for anticoagulation management for the diagnosis of Atrial Fibrillation. HPI: At last visit (4/26), the patient's INR was 2.3 and therapeutic. Patient denied changes to his medications and reported consistent intake of vitamin K foods. Patient will continue current regimen and recheck INR in 4 week(s). Interim History:    Warfarin start date: Prior to 3/6/20 per chart review. INR Goal:  2.0-3.0    Current warfarin regimen:  5 mg daily   Warfarin tablet strength:   5 mg   Duration of therapy: Indefinite      Results for orders placed or performed in visit on 05/24/23   POCT INR   Result Value Ref Range    INR,(POC) 2.2 2 - 3         Today's pertinent positives includes:  No significant changes since last visit        Adherence:   Able to recall regimen? YES  Miss/extra dose? NO  Need refill?  NO    Upcoming procedure(s):  N/A      Past Medical History:   Diagnosis Date    Atrial fibrillation (HCC)     CAD (coronary artery disease)     Chronic systolic HF (heart failure) (HCC)     Congestive heart failure (HCC)     DM type 2 (diabetes mellitus, type 2) (HCC)     Gout     HTN (hypertension)     Hypercholesterolemia     ICD (implantable cardioverter-defibrillator) in place     Long term current use of anticoagulant therapy     ASA & Warfarin    Nonischemic cardiomyopathy ([facility] [address] 2/8/2019    S/P cardiac cath 2/8/2019 2/8/19 cardiac cath with nonobstructive disease    Stroke [facility])     tia IN 2019    Syncope      Not on File  Current Outpatient Medications   Medication Sig    acetaminophen (TYLENOL) 500 MG tablet Take 1,000 mg by mouth daily as needed    amLODIPine (NORVASC) 5 MG tablet Take 5 mg by mouth daily    aspirin 81 MG EC tablet Take 81 mg by mouth daily    atorvastatin (LIPITOR) 10 MG tablet TAKE 1 TABLET EVERY DAY    carvedilol (COREG) 25 MG tablet Take 25 mg by mouth 2 times daily (with meals)

## 2023-05-24 NOTE — PATIENT INSTRUCTIONS
Today your INR was 2.2. Your goal INR is  2.0-3.0 . You have a   5 mg tablet of Coumadin (warfarin). Take Coumadin (warfarin) as follows: Take 5 mg (1 tablet) every day. Come back in 4 week(s) for your next finger stick/INR blood test.        Avoid any over the counter items containing aspirin or ibuprofen, and avoid great swings in general diet. Avoid alcohol consumption. Please notify the INR pharmacist if you are started on any new medication including over the counter or herbal supplements. Also, please notify your INR pharmacist if any of your other prescription or over the counter medications have been discontinued. Call [facility] at [phone] if you have any signs of abnormal bleeding/blood clot.  ------------------------------------------------------------------------------------------------------------------  Taking Warfarin Safely: Care Instructions    Your Care Instructions  Warfarin is a medicine that you take to prevent blood clots. It is often called a blood thinner. Doctors give warfarin (such as Coumadin) to reduce the risk of blood clots. You may be at risk for blood clots if you have atrial fibrillation or deep vein thrombosis. Some other health problems may also put you at risk. Warfarin slows the amount of time it takes for your blood to clot. It can cause bleeding problems. Even if you've been taking warfarin for a while, it's important to know how to take it safely. Foods and other medicines can affect the way warfarin works. Some can make warfarin work too well. This can cause bleeding problems. And some can make it work poorly, so that it does not prevent blood clots very well. You will need regular blood tests to check how long it takes for your blood to form a clot. This test is called a PT or prothrombin time test. The result of the test is called an INR level.  Depending on the test results, your doctor or

## 2023-06-21 ENCOUNTER — ANTI-COAG VISIT (OUTPATIENT)
Facility: HOSPITAL | Age: 73
End: 2023-06-21
Payer: MEDICARE

## 2023-06-21 DIAGNOSIS — I48.0 PAROXYSMAL ATRIAL FIBRILLATION (HCC): Primary | ICD-10-CM

## 2023-06-21 DIAGNOSIS — Z79.01 LONG TERM (CURRENT) USE OF ANTICOAGULANTS: ICD-10-CM

## 2023-06-21 LAB — INTERNATIONAL NORMALIZATION RATIO, POC: 2.8 (ref 2–3)

## 2023-06-21 PROCEDURE — 99211 OFF/OP EST MAY X REQ PHY/QHP: CPT

## 2023-06-21 PROCEDURE — 85610 PROTHROMBIN TIME: CPT

## 2023-06-21 NOTE — PROGRESS NOTES
the AVS that includes topics on understanding coumadin therapy, drug interaction considerations, vitamin K and coumadin use, interactions with foods and supplements containing vitamin K, and the use of herbal products. Mr. Carlos Manuel Hicks verbalized his understanding of all instructions and will call the office with any questions, concerns, or signs of abnormal bleeding or blood clot. Notifications of recommendations will be sent to Dr. Effie Stewart MD for review.     Thank you,  Vidal Allen, [address] Tracking Only    Intervention Detail: Adherence Monitorin and Lab(s) Ordered  Total # of Interventions Recommended: 2  Total # of Interventions Accepted: 2  Time Spent (min): 15
None

## 2023-06-21 NOTE — PATIENT INSTRUCTIONS
Today your INR was 2.8 . Your goal INR is  2.0-3.0 . You have a   5 mg tablet of Coumadin (warfarin). Take Coumadin (warfarin) as follows: Take 5 mg (1 tablet) every day. Come back in 4 week(s) for your next finger stick/INR blood test.        Avoid any over the counter items containing aspirin or ibuprofen, and avoid great swings in general diet. Avoid alcohol consumption. Please notify the INR pharmacist if you are started on any new medication including over the counter or herbal supplements. Also, please notify your INR pharmacist if any of your other prescription or over the counter medications have been discontinued. Call [facility] at [phone] if you have any signs of abnormal bleeding/blood clot.  ------------------------------------------------------------------------------------------------------------------  Taking Warfarin Safely: Care Instructions    Your Care Instructions  Warfarin is a medicine that you take to prevent blood clots. It is often called a blood thinner. Doctors give warfarin (such as Coumadin) to reduce the risk of blood clots. You may be at risk for blood clots if you have atrial fibrillation or deep vein thrombosis. Some other health problems may also put you at risk. Warfarin slows the amount of time it takes for your blood to clot. It can cause bleeding problems. Even if you've been taking warfarin for a while, it's important to know how to take it safely. Foods and other medicines can affect the way warfarin works. Some can make warfarin work too well. This can cause bleeding problems. And some can make it work poorly, so that it does not prevent blood clots very well. You will need regular blood tests to check how long it takes for your blood to form a clot. This test is called a PT or prothrombin time test. The result of the test is called an INR level.  Depending on the test results, your doctor or

## 2023-07-09 ENCOUNTER — HOSPITAL ENCOUNTER (OUTPATIENT)
Facility: HOSPITAL | Age: 73
Discharge: HOME | End: 2023-07-12
Payer: MEDICARE

## 2023-07-09 DIAGNOSIS — Z12.2 SCREENING FOR LUNG CANCER: ICD-10-CM

## 2023-07-09 DIAGNOSIS — Z87.891 PERSONAL HISTORY OF TOBACCO USE: ICD-10-CM

## 2023-07-09 DIAGNOSIS — F17.200 TOBACCO USE DISORDER: ICD-10-CM

## 2023-07-09 PROCEDURE — 71271 CT THORAX LUNG CANCER SCR C-: CPT

## 2023-07-19 ENCOUNTER — ANTI-COAG VISIT (OUTPATIENT)
Facility: HOSPITAL | Age: 73
End: 2023-07-19
Payer: MEDICARE

## 2023-07-19 DIAGNOSIS — I48.0 PAROXYSMAL ATRIAL FIBRILLATION (HCC): Primary | ICD-10-CM

## 2023-07-19 DIAGNOSIS — Z79.01 LONG TERM (CURRENT) USE OF ANTICOAGULANTS: ICD-10-CM

## 2023-07-19 LAB — INTERNATIONAL NORMALIZATION RATIO, POC: 1.8 (ref 2–3)

## 2023-07-19 PROCEDURE — 99212 OFFICE O/P EST SF 10 MIN: CPT

## 2023-07-19 PROCEDURE — 85610 PROTHROMBIN TIME: CPT

## 2023-07-19 NOTE — PROGRESS NOTES
2.4                   28.9     hold dose 2/16 then take 5 mg daily  02/16/23          4.0                   48.2     7.5 mg Sat; 5 mg daily ROW  02/01/23          3.8                   45.9     7.5 mg Sat; 5 mg daily ROW  01/04/23          2.7                   32.1     7.5 mg Sat; 5 mg daily ROW    Medications that can increase  INR: multivitamin, glimepiride  Medications that can decrease INR: metformin    A/P:       Anticoagulation:  Considering Mr. Marli Burns past history, todays findings, and per Anticoagulation Collaborative Practice Agreement/Protocol:    Fingerstick POC INR (1.8) is Subtherapeutic for INR goal today. Change warfarin to 7.5 mg today (7/19) and then resume 5 mg daily  Patient's INR is 1.8 and subtherapeutic. Patient denies any missed doses of warfarin and denies changes to his medications. Patient reports eating adrienne greens last night for dinner. Patient has been previously therapeutic on current regimen and will take warfarin 7.5 mg today (7/19) and then resume warfarin 5 mg daily. Patient will recheck INR in 3 week(s). Patient was instructed to schedule an appointment in 3 week(s) prior to leaving the clinic. Medication reconciliation was completed during the visit. There are no discontinued medications. A full discussion of the nature of anticoagulants has been carried out. A full discussion of the need for frequent and regular monitoring, precise dosage adjustment and compliance was stressed. Side effects of potential bleeding were discussed and Mr. Dowd was instructed to call [phone] if there are any signs of abnormal bleeding. Mr. Ashlyn Odom was instructed to avoid any OTC items containing aspirin or ibuprofen and prior to starting any new OTC products to consult with his physician or pharmacist to ensure no drug interactions are present. Mr. Ashlyn Odom was instructed to avoid any major changes in his general diet and to avoid alcohol consumption.     Mr. Ashlyn Odom was provided

## 2023-07-19 NOTE — PATIENT INSTRUCTIONS
level. Depending on the test results, your doctor or anticoagulation clinic may adjust your dose of warfarin. Follow-up care is a key part of your treatment and safety. Be sure to make and go to all appointments, and call your doctor if you are having problems. It's also a good idea to know your test results and keep a list of the medicines you take. How can you care for yourself at home? Take warfarin safely  Take your warfarin at the same time each day. If you miss a dose of warfarin, don't take an extra dose to make up for it. Your doctor can tell you exactly what to do so you don't take too much or too little. Wear medical alert jewelry that lets others know that you take warfarin. You can buy this at most drugstores. Don't take warfarin if you are pregnant or planning to get pregnant. Talk to your doctor about how you can prevent getting pregnant while you are taking it. Don't change your dose or stop taking warfarin unless your doctor tells you to. Effects of medicines and food on warfarin  Don't start or stop taking any medicines, vitamins, or natural remedies unless you first talk to your doctor. Many medicines can affect how warfarin works. These include aspirin and other pain relievers, over-the-counter medicines, multivitamins, dietary supplements, and herbal products. Tell all of your doctors and pharmacists that you take warfarin. Some prescription medicines can affect how warfarin works. Keep the amount of vitamin K in your diet about the same from day to day. Do not suddenly eat a lot more or a lot less food that is rich in vitamin K than you usually do. Vitamin K affects how warfarin works and how your blood clots. Talk with your doctor before making big changes in your diet. Vitamin K is in many foods, such as:  Leafy greens, such as kale, cabbage, spinach, Swiss chard, and lettuce. Canola and soybean oils.   Green vegetables, such as asparagus, broccoli, and [address]

## 2023-08-09 ENCOUNTER — ANTI-COAG VISIT (OUTPATIENT)
Facility: HOSPITAL | Age: 73
End: 2023-08-09
Payer: MEDICARE

## 2023-08-09 DIAGNOSIS — Z79.01 LONG TERM (CURRENT) USE OF ANTICOAGULANTS: ICD-10-CM

## 2023-08-09 DIAGNOSIS — I48.0 PAROXYSMAL ATRIAL FIBRILLATION (HCC): Primary | ICD-10-CM

## 2023-08-09 LAB — INTERNATIONAL NORMALIZATION RATIO, POC: 2 (ref 2–3)

## 2023-08-09 PROCEDURE — 99211 OFF/OP EST MAY X REQ PHY/QHP: CPT

## 2023-08-09 PROCEDURE — 85610 PROTHROMBIN TIME: CPT

## 2023-08-09 NOTE — PATIENT INSTRUCTIONS
Today your INR was 2.0 . Your goal INR is  2.0-3.0 . You have a   5 mg tablet of Coumadin (warfarin). Take Coumadin (warfarin) as follows: Take 5 mg (1 tablet) every day. Come back in 4 week(s) for your next finger stick/INR blood test.        Avoid any over the counter items containing aspirin or ibuprofen, and avoid great swings in general diet. Avoid alcohol consumption. Please notify the INR pharmacist if you are started on any new medication including over the counter or herbal supplements. Also, please notify your INR pharmacist if any of your other prescription or over the counter medications have been discontinued. Call [facility] at [phone] if you have any signs of abnormal bleeding/blood clot.  ------------------------------------------------------------------------------------------------------------------  Taking Warfarin Safely: Care Instructions    Your Care Instructions  Warfarin is a medicine that you take to prevent blood clots. It is often called a blood thinner. Doctors give warfarin (such as Coumadin) to reduce the risk of blood clots. You may be at risk for blood clots if you have atrial fibrillation or deep vein thrombosis. Some other health problems may also put you at risk. Warfarin slows the amount of time it takes for your blood to clot. It can cause bleeding problems. Even if you've been taking warfarin for a while, it's important to know how to take it safely. Foods and other medicines can affect the way warfarin works. Some can make warfarin work too well. This can cause bleeding problems. And some can make it work poorly, so that it does not prevent blood clots very well. You will need regular blood tests to check how long it takes for your blood to form a clot. This test is called a PT or prothrombin time test. The result of the test is called an INR level.  Depending on the test results, your doctor or

## 2023-08-09 NOTE — PROGRESS NOTES
consumption. Mr. Yeimy Vasquez was provided information in the AVS that includes topics on understanding coumadin therapy, drug interaction considerations, vitamin K and coumadin use, interactions with foods and supplements containing vitamin K, and the use of herbal products. Mr. Yeimy Vasquez verbalized his understanding of all instructions and will call the office with any questions, concerns, or signs of abnormal bleeding or blood clot. Notifications of recommendations will be sent to Dr. Oskar Keith MD for review.     Thank you,  Elinor Castano, [id number] Tracking Only    Intervention Detail: Adherence Monitorin and Lab(s) Ordered  Total # of Interventions Recommended: 2  Total # of Interventions Accepted: 2  Time Spent (min): 15

## 2023-09-05 NOTE — PROGRESS NOTES
2.1                   25.3     5 mg daily  03/02/23          2.4                   28.9     hold dose 2/16 then take 5 mg daily  02/16/23          4.0                   48.2     7.5 mg Sat; 5 mg daily ROW  02/01/23          3.8                   45.9     7.5 mg Sat; 5 mg daily ROW  01/04/23          2.7                   32.1     7.5 mg Sat; 5 mg daily ROW    Medications that can increase  INR: multivitamin, glimepiride  Medications that can decrease INR: metformin    A/P:       Anticoagulation:  Considering Mr. Rosa Ventura past history, todays findings, and per Anticoagulation Collaborative Practice Agreement/Protocol:    Fingerstick POC INR (2.5) is Therapeutic for INR goal today. Continue warfarin 5 mg daily  Patient's INR is 2.5 and therapeutic. Patient denies any changes to his medications and reports consistent intake of vitamin K foods. Patient will continue current warfarin regimen and recheck INR in 4 week(s). Patient was instructed to schedule an appointment in 4 week(s) prior to leaving the clinic. Medication reconciliation was completed during the visit. There are no discontinued medications. A full discussion of the nature of anticoagulants has been carried out. A full discussion of the need for frequent and regular monitoring, precise dosage adjustment and compliance was stressed. Side effects of potential bleeding were discussed and Mr. Dowd was instructed to call [phone] if there are any signs of abnormal bleeding. Mr. Ashish Schwartz was instructed to avoid any OTC items containing aspirin or ibuprofen and prior to starting any new OTC products to consult with his physician or pharmacist to ensure no drug interactions are present. Mr. Ashish Schwartz was instructed to avoid any major changes in his general diet and to avoid alcohol consumption.     Mr. Ashish Schwartz was provided information in the AVS that includes topics on understanding coumadin therapy, drug interaction considerations, vitamin K and

## 2023-09-06 ENCOUNTER — ANTI-COAG VISIT (OUTPATIENT)
Facility: HOSPITAL | Age: 73
End: 2023-09-06
Payer: MEDICARE

## 2023-09-06 DIAGNOSIS — I48.0 PAROXYSMAL ATRIAL FIBRILLATION (HCC): Primary | ICD-10-CM

## 2023-09-06 DIAGNOSIS — Z79.01 LONG TERM (CURRENT) USE OF ANTICOAGULANTS: ICD-10-CM

## 2023-09-06 LAB — INTERNATIONAL NORMALIZATION RATIO, POC: 2.5 (ref 2–3)

## 2023-09-06 PROCEDURE — 99211 OFF/OP EST MAY X REQ PHY/QHP: CPT

## 2023-09-06 PROCEDURE — 85610 PROTHROMBIN TIME: CPT

## 2023-10-04 NOTE — PATIENT INSTRUCTIONS
Today your INR was 2.3 . Your goal INR is  2.0-3.0 . You have a   5 mg tablet of Coumadin (warfarin). Take Coumadin (warfarin) as follows: Take 5 mg (1 tablet) every day. Come back in 4 week(s) for your next finger stick/INR blood test.        Avoid any over the counter items containing aspirin or ibuprofen, and avoid great swings in general diet. Avoid alcohol consumption. Please notify the INR pharmacist if you are started on any new medication including over the counter or herbal supplements. Also, please notify your INR pharmacist if any of your other prescription or over the counter medications have been discontinued. Call [facility] at [phone] if you have any signs of abnormal bleeding/blood clot.  ------------------------------------------------------------------------------------------------------------------  Taking Warfarin Safely: Care Instructions    Your Care Instructions  Warfarin is a medicine that you take to prevent blood clots. It is often called a blood thinner. Doctors give warfarin (such as Coumadin) to reduce the risk of blood clots. You may be at risk for blood clots if you have atrial fibrillation or deep vein thrombosis. Some other health problems may also put you at risk. Warfarin slows the amount of time it takes for your blood to clot. It can cause bleeding problems. Even if you've been taking warfarin for a while, it's important to know how to take it safely. Foods and other medicines can affect the way warfarin works. Some can make warfarin work too well. This can cause bleeding problems. And some can make it work poorly, so that it does not prevent blood clots very well. You will need regular blood tests to check how long it takes for your blood to form a clot. This test is called a PT or prothrombin time test. The result of the test is called an INR level.  Depending on the test results, your doctor or

## 2023-10-04 NOTE — PROGRESS NOTES
Pharmacy Progress Note - Anticoagulation Management    S/O:  Mr. Katherine Polanco  is a 68 y.o. male seen today for anticoagulation management for the diagnosis of Atrial Fibrillation. HPI: At last visit (9/6), the patient's INR was 2.5 and therapeutic. Patient denied any changes to his medications and reported consistent intake of vitamin K foods. Patient will continue current warfarin regimen and recheck INR in 4 week(s). Interim History:    Warfarin start date: Prior to 3/6/20 per chart review. INR Goal:  2.0-3.0    Current warfarin regimen:  5 mg daily  Warfarin tablet strength:   5 mg   Duration of therapy: Indefinite      Results for orders placed or performed in visit on 10/18/23   POCT INR   Result Value Ref Range    INR,(POC) 2.3 2 - 3       Today's pertinent positives includes:  No significant changes since last visit      Adherence:   Able to recall regimen? YES  Miss/extra dose? NO  Need refill?  NO    Upcoming procedure(s):  N/A    Past Medical History:   Diagnosis Date    Atrial fibrillation (HCC)     CAD (coronary artery disease)     Chronic systolic HF (heart failure) (HCC)     Congestive heart failure (HCC)     DM type 2 (diabetes mellitus, type 2) (HCC)     Gout     HTN (hypertension)     Hypercholesterolemia     ICD (implantable cardioverter-defibrillator) in place     Long term current use of anticoagulant therapy     ASA & Warfarin    Nonischemic cardiomyopathy ([address]) 2/8/2019    S/P cardiac cath 2/8/2019 2/8/19 cardiac cath with nonobstructive disease    Stroke [facility])     tia IN 2019    Syncope      Not on File  Current Outpatient Medications   Medication Sig    acetaminophen (TYLENOL) 500 MG tablet Take 1,000 mg by mouth daily as needed    amLODIPine (NORVASC) 5 MG tablet Take 5 mg by mouth daily    aspirin 81 MG EC tablet Take 81 mg by mouth daily    atorvastatin (LIPITOR) 10 MG tablet TAKE 1 TABLET EVERY DAY    carvedilol (COREG) 25 MG tablet Take 25 mg by mouth 2 times daily (with

## 2023-10-18 ENCOUNTER — ANTI-COAG VISIT (OUTPATIENT)
Facility: HOSPITAL | Age: 73
End: 2023-10-18
Payer: MEDICARE

## 2023-10-18 DIAGNOSIS — I48.0 PAROXYSMAL ATRIAL FIBRILLATION (HCC): Primary | ICD-10-CM

## 2023-10-18 DIAGNOSIS — Z79.01 LONG TERM (CURRENT) USE OF ANTICOAGULANTS: ICD-10-CM

## 2023-10-18 LAB — INTERNATIONAL NORMALIZATION RATIO, POC: 2.3 (ref 2–3)

## 2023-10-18 PROCEDURE — 85610 PROTHROMBIN TIME: CPT

## 2023-10-18 PROCEDURE — 99211 OFF/OP EST MAY X REQ PHY/QHP: CPT

## 2023-11-15 ENCOUNTER — ANTI-COAG VISIT (OUTPATIENT)
Facility: HOSPITAL | Age: 73
End: 2023-11-15
Payer: MEDICARE

## 2023-11-15 DIAGNOSIS — Z79.01 LONG TERM (CURRENT) USE OF ANTICOAGULANTS: ICD-10-CM

## 2023-11-15 DIAGNOSIS — I48.0 PAROXYSMAL ATRIAL FIBRILLATION (HCC): Primary | ICD-10-CM

## 2023-11-15 LAB — INTERNATIONAL NORMALIZATION RATIO, POC: 2.7 (ref 2–3)

## 2023-11-15 PROCEDURE — 85610 PROTHROMBIN TIME: CPT

## 2023-11-15 PROCEDURE — 99211 OFF/OP EST MAY X REQ PHY/QHP: CPT

## 2023-11-15 NOTE — PROGRESS NOTES
Pharmacy Progress Note - Anticoagulation Management    S/O:  Mr. Car Mckeon  is a 68 y.o. male seen today for anticoagulation management for the diagnosis of Atrial Fibrillation. HPI: At last visit (10/18), the patient's INR was 2.3 and therapeutic. Patient denied any changes to his medications and reported consistent intake of vitamin K foods. Patient will continue current warfarin regimen of 5 mg daily and recheck INR in 4 week(s). Interim History:    Warfarin start date: Prior to 3/6/20 per chart review. INR Goal:  2.0-3.0    Current warfarin regimen:  5 mg daily  Warfarin tablet strength:   5 mg   Duration of therapy: Indefinite      Results for orders placed or performed in visit on 11/15/23   POCT INR   Result Value Ref Range    INR,(POC) 2.7 2 - 3       Today's pertinent positives includes:  No significant changes since last visit      Adherence:   Able to recall regimen? YES  Miss/extra dose? NO  Need refill? NO    Upcoming procedure(s):  YES    Patient reports an upcoming watchman procedure January 2024.     Past Medical History:   Diagnosis Date    Atrial fibrillation (HCC)     CAD (coronary artery disease)     Chronic systolic HF (heart failure) (HCC)     Congestive heart failure (HCC)     DM type 2 (diabetes mellitus, type 2) (HCC)     Gout     HTN (hypertension)     Hypercholesterolemia     ICD (implantable cardioverter-defibrillator) in place     Long term current use of anticoagulant therapy     ASA & Warfarin    Nonischemic cardiomyopathy ([address]) 2/8/2019    S/P cardiac cath 2/8/2019 2/8/19 cardiac cath with nonobstructive disease    Stroke [facility])     tia IN 2019    Syncope      Not on File  Current Outpatient Medications   Medication Sig    acetaminophen (TYLENOL) 500 MG tablet Take 1,000 mg by mouth daily as needed    amLODIPine (NORVASC) 5 MG tablet Take 5 mg by mouth daily    aspirin 81 MG EC tablet Take 81 mg by mouth daily    atorvastatin (LIPITOR) 10 MG tablet TAKE 1 TABLET EVERY

## 2023-11-15 NOTE — PATIENT INSTRUCTIONS
Today your INR was 2.7 . Your goal INR is  2.0-3.0 . You have a   5 mg tablet of Coumadin (warfarin). Take Coumadin (warfarin) as follows: Take 5 mg (1 tablet) every day. Come back in 4 week(s) for your next finger stick/INR blood test.        Avoid any over the counter items containing aspirin or ibuprofen, and avoid great swings in general diet. Avoid alcohol consumption. Please notify the INR pharmacist if you are started on any new medication including over the counter or herbal supplements. Also, please notify your INR pharmacist if any of your other prescription or over the counter medications have been discontinued. Call [facility] at [phone] if you have any signs of abnormal bleeding/blood clot.  ------------------------------------------------------------------------------------------------------------------  Taking Warfarin Safely: Care Instructions    Your Care Instructions  Warfarin is a medicine that you take to prevent blood clots. It is often called a blood thinner. Doctors give warfarin (such as Coumadin) to reduce the risk of blood clots. You may be at risk for blood clots if you have atrial fibrillation or deep vein thrombosis. Some other health problems may also put you at risk. Warfarin slows the amount of time it takes for your blood to clot. It can cause bleeding problems. Even if you've been taking warfarin for a while, it's important to know how to take it safely. Foods and other medicines can affect the way warfarin works. Some can make warfarin work too well. This can cause bleeding problems. And some can make it work poorly, so that it does not prevent blood clots very well. You will need regular blood tests to check how long it takes for your blood to form a clot. This test is called a PT or prothrombin time test. The result of the test is called an INR level.  Depending on the test results, your doctor or

## 2023-12-13 ENCOUNTER — ANTI-COAG VISIT (OUTPATIENT)
Facility: HOSPITAL | Age: 73
End: 2023-12-13
Payer: MEDICARE

## 2023-12-13 DIAGNOSIS — I48.0 PAROXYSMAL ATRIAL FIBRILLATION (HCC): Primary | ICD-10-CM

## 2023-12-13 DIAGNOSIS — Z79.01 LONG TERM (CURRENT) USE OF ANTICOAGULANTS: ICD-10-CM

## 2023-12-13 LAB — INTERNATIONAL NORMALIZATION RATIO, POC: 2.3 (ref 2–3)

## 2023-12-13 PROCEDURE — 99211 OFF/OP EST MAY X REQ PHY/QHP: CPT

## 2023-12-13 PROCEDURE — 85610 PROTHROMBIN TIME: CPT

## 2023-12-13 NOTE — PROGRESS NOTES
mouth daily    atorvastatin (LIPITOR) 10 MG tablet TAKE 1 TABLET EVERY DAY    carvedilol (COREG) 25 MG tablet Take 25 mg by mouth 2 times daily (with meals)    dapagliflozin (FARXIGA) 5 MG tablet Take 5 mg by mouth daily    furosemide (LASIX) 20 MG tablet Take 20 mg by mouth daily    metFORMIN (GLUCOPHAGE-XR) 500 MG extended release tablet Take by mouth    potassium chloride (KLOR-CON M) 10 MEQ extended release tablet Take 10 mEq by mouth 2 times daily    sacubitril-valsartan (ENTRESTO) 49-51 MG per tablet Take 1 tablet by mouth 2 times daily    warfarin (COUMADIN) 5 MG tablet Take 1.5 tablets (7.5 mg) by mouth every Saturday. Take 1 tablet (5 mg) every Sunday, Monday, Tuesday, Wednesday, Thursday and Friday. Or take as directed by the clinic. No current facility-administered medications for this visit. Wt Readings from Last 3 Encounters:   05/12/22 98.9 kg (218 lb)   04/12/22 99.2 kg (218 lb 9.6 oz)   03/31/22 98.6 kg (217 lb 6.4 oz)     BP Readings from Last 3 Encounters:   05/12/22 113/84   04/12/22 120/70   03/31/22 98/62     Pulse Readings from Last 3 Encounters:   05/12/22 69   04/12/22 70   03/31/22 68     Lab Results   Component Value Date/Time    WBC 4.7 04/05/2022 11:07 AM    HGB 16.7 04/05/2022 11:07 AM    HCT 49.0 04/05/2022 11:07 AM     04/05/2022 11:07 AM    MCV 84.5 04/05/2022 11:07 AM     Lab Results   Component Value Date/Time     04/05/2022 11:07 AM    K 3.8 04/05/2022 11:07 AM     04/05/2022 11:07 AM    CO2 23 04/05/2022 11:07 AM    BUN 9 04/05/2022 11:07 AM    GFRAA >60 04/05/2022 11:07 AM    GLOB 3.6 01/19/2022 03:35 PM    ALT 32 01/19/2022 03:35 PM     CrCl cannot be calculated (Patient's most recent lab result is older than the maximum 180 days allowed.).       INR History:   (normal INR range 0.8-1.2)     Date   INR   PT   Dose/Comments  12/13/23 2.3  28.2 5 mg daily  11/15/23 2.7  32.8 5 mg daily  10/18/23 2.3  27.8 5 mg daily  09/06/23 2.5  30.2 5 mg

## 2023-12-13 NOTE — PATIENT INSTRUCTIONS
Today your INR was 2.3 . Your goal INR is  2.0-3.0 . You have a   5 mg tablet of Coumadin (warfarin). Take Coumadin (warfarin) as follows: Take 5 mg (1 tablet) every day. Come back in 4 week(s) for your next finger stick/INR blood test.        Avoid any over the counter items containing aspirin or ibuprofen, and avoid great swings in general diet. Avoid alcohol consumption. Please notify the INR pharmacist if you are started on any new medication including over the counter or herbal supplements. Also, please notify your INR pharmacist if any of your other prescription or over the counter medications have been discontinued. Call [facility] at [phone] if you have any signs of abnormal bleeding/blood clot.  ------------------------------------------------------------------------------------------------------------------  Taking Warfarin Safely: Care Instructions    Your Care Instructions  Warfarin is a medicine that you take to prevent blood clots. It is often called a blood thinner. Doctors give warfarin (such as Coumadin) to reduce the risk of blood clots. You may be at risk for blood clots if you have atrial fibrillation or deep vein thrombosis. Some other health problems may also put you at risk. Warfarin slows the amount of time it takes for your blood to clot. It can cause bleeding problems. Even if you've been taking warfarin for a while, it's important to know how to take it safely. Foods and other medicines can affect the way warfarin works. Some can make warfarin work too well. This can cause bleeding problems. And some can make it work poorly, so that it does not prevent blood clots very well. You will need regular blood tests to check how long it takes for your blood to form a clot. This test is called a PT or prothrombin time test. The result of the test is called an INR level.  Depending on the test results, your doctor or

## 2024-01-09 NOTE — PROGRESS NOTES
Pharmacy Progress Note - Anticoagulation Management    S/O:  Mr. Samir Dowd Jr.  is a 73 y.o. male seen today for anticoagulation management for the diagnosis of Atrial Fibrillation.     HPI: At last visit (12/13), the patient's INR was 2.3 and therapeutic. Patient denied any changes to his medications and reported consistent intake of vitamin K foods. Patient will continue current warfarin regimen of 5 mg daily and recheck INR in 4 week(s).    Interim History:    Warfarin start date: Prior to 3/6/20 per chart review.  INR Goal:  2.0-3.0    Current warfarin regimen:  5 mg daily  Warfarin tablet strength:   5 mg   Duration of therapy: Indefinite      Results for orders placed or performed in visit on 01/10/24   POCT INR   Result Value Ref Range    INR,(POC) 3.5 (A) 2 - 3       Today's pertinent positives includes:  Change in diet/appetite    Patient wife reports patient may have eaten less foods vitamin K than usual.    Adherence:   Able to recall regimen? YES  Miss/extra dose? NO  Need refill? NO    Upcoming procedure(s):  YES    Patient reports an upcoming watchman procedure on 1/17/24 that will require warfarin to be held for 2 days (1/15-1/16) prior to the procedure.    Past Medical History:   Diagnosis Date    Atrial fibrillation (HCC)     Dr. Ruth    CAD (coronary artery disease)     Dr. Fisher    Chronic systolic HF (heart failure) (HCC)     Congestive heart failure (HCC)     DM type 2 (diabetes mellitus, type 2) (HCC)     History of gout     HTN (hypertension)     Hypercholesterolemia     ICD (implantable cardioverter-defibrillator) in place     Long term current use of anticoagulant therapy     ASA & Warfarin    Nonischemic cardiomyopathy (HCC)     TIA (transient ischemic attack) 2019     No Known Allergies  Current Outpatient Medications   Medication Sig    Multiple Vitamin (MULTIVITAMIN ADULT PO) Take by mouth    terbinafine (LAMISIL) 250 MG tablet Take 1 tablet by mouth daily For 90 days

## 2024-01-10 ENCOUNTER — ANTI-COAG VISIT (OUTPATIENT)
Facility: HOSPITAL | Age: 74
End: 2024-01-10
Payer: MEDICARE

## 2024-01-10 ENCOUNTER — HOSPITAL ENCOUNTER (OUTPATIENT)
Facility: HOSPITAL | Age: 74
Discharge: HOME | End: 2024-01-13
Payer: MEDICARE

## 2024-01-10 VITALS
RESPIRATION RATE: 16 BRPM | HEART RATE: 76 BPM | TEMPERATURE: 97.8 F | OXYGEN SATURATION: 98 % | DIASTOLIC BLOOD PRESSURE: 81 MMHG | HEIGHT: 68 IN | SYSTOLIC BLOOD PRESSURE: 115 MMHG | BODY MASS INDEX: 29.4 KG/M2 | WEIGHT: 194 LBS

## 2024-01-10 DIAGNOSIS — I48.0 PAROXYSMAL ATRIAL FIBRILLATION (HCC): Primary | ICD-10-CM

## 2024-01-10 DIAGNOSIS — Z79.01 LONG TERM (CURRENT) USE OF ANTICOAGULANTS: ICD-10-CM

## 2024-01-10 LAB
ANION GAP SERPL CALC-SCNC: 4 MMOL/L (ref 5–15)
APPEARANCE UR: CLEAR
BACTERIA URNS QL MICRO: NEGATIVE /HPF
BILIRUB UR QL: NEGATIVE
BUN SERPL-MCNC: 11 MG/DL (ref 6–20)
BUN/CREAT SERPL: 9 (ref 12–20)
CALCIUM SERPL-MCNC: 9.4 MG/DL (ref 8.5–10.1)
CHLORIDE SERPL-SCNC: 109 MMOL/L (ref 97–108)
CO2 SERPL-SCNC: 29 MMOL/L (ref 21–32)
COLOR UR: ABNORMAL
CREAT SERPL-MCNC: 1.27 MG/DL (ref 0.7–1.3)
EKG ATRIAL RATE: 77 BPM
EKG DIAGNOSIS: NORMAL
EKG P-R INTERVAL: 116 MS
EKG Q-T INTERVAL: 528 MS
EKG QRS DURATION: 198 MS
EKG QTC CALCULATION (BAZETT): 597 MS
EKG R AXIS: 93 DEGREES
EKG T AXIS: 269 DEGREES
EKG VENTRICULAR RATE: 77 BPM
EPITH CASTS URNS QL MICRO: ABNORMAL /LPF
ERYTHROCYTE [DISTWIDTH] IN BLOOD BY AUTOMATED COUNT: 14.4 % (ref 11.5–14.5)
GLUCOSE SERPL-MCNC: 255 MG/DL (ref 65–100)
GLUCOSE UR STRIP.AUTO-MCNC: >1000 MG/DL
HCT VFR BLD AUTO: 50.7 % (ref 36.6–50.3)
HGB BLD-MCNC: 16.8 G/DL (ref 12.1–17)
HGB UR QL STRIP: NEGATIVE
HYALINE CASTS URNS QL MICRO: ABNORMAL /LPF (ref 0–2)
INTERNATIONAL NORMALIZATION RATIO, POC: 3.5 (ref 2–3)
KETONES UR QL STRIP.AUTO: NEGATIVE MG/DL
LEUKOCYTE ESTERASE UR QL STRIP.AUTO: NEGATIVE
MCH RBC QN AUTO: 28.9 PG (ref 26–34)
MCHC RBC AUTO-ENTMCNC: 33.1 G/DL (ref 30–36.5)
MCV RBC AUTO: 87.3 FL (ref 80–99)
NITRITE UR QL STRIP.AUTO: NEGATIVE
NRBC # BLD: 0 K/UL (ref 0–0.01)
NRBC BLD-RTO: 0 PER 100 WBC
PH UR STRIP: 6 (ref 5–8)
PLATELET # BLD AUTO: 118 K/UL (ref 150–400)
PMV BLD AUTO: 11.4 FL (ref 8.9–12.9)
POTASSIUM SERPL-SCNC: 3.2 MMOL/L (ref 3.5–5.1)
PROT UR STRIP-MCNC: NEGATIVE MG/DL
RBC # BLD AUTO: 5.81 M/UL (ref 4.1–5.7)
RBC #/AREA URNS HPF: ABNORMAL /HPF (ref 0–5)
SODIUM SERPL-SCNC: 142 MMOL/L (ref 136–145)
SP GR UR REFRACTOMETRY: 1.02
URINE CULTURE IF INDICATED: ABNORMAL
UROBILINOGEN UR QL STRIP.AUTO: 1 EU/DL (ref 0.2–1)
WBC # BLD AUTO: 3.7 K/UL (ref 4.1–11.1)
WBC URNS QL MICRO: ABNORMAL /HPF (ref 0–4)

## 2024-01-10 PROCEDURE — 99212 OFFICE O/P EST SF 10 MIN: CPT

## 2024-01-10 PROCEDURE — 85027 COMPLETE CBC AUTOMATED: CPT

## 2024-01-10 PROCEDURE — 36415 COLL VENOUS BLD VENIPUNCTURE: CPT

## 2024-01-10 PROCEDURE — 71046 X-RAY EXAM CHEST 2 VIEWS: CPT

## 2024-01-10 PROCEDURE — 85610 PROTHROMBIN TIME: CPT

## 2024-01-10 PROCEDURE — 80048 BASIC METABOLIC PNL TOTAL CA: CPT

## 2024-01-10 PROCEDURE — 81001 URINALYSIS AUTO W/SCOPE: CPT

## 2024-01-10 RX ORDER — TERBINAFINE HYDROCHLORIDE 250 MG/1
250 TABLET ORAL DAILY
COMMUNITY

## 2024-01-10 NOTE — PERIOP NOTE
Mary called from Dr. Ruth's office, per brenda Gao, NP CXR is okay to proceed with planned procedure.

## 2024-01-12 NOTE — PERIOP NOTE
Called pt in regards to K+ level. Spoke to pt's wife and she stated Dr. Ruth's office contacted them about pt's K+ level. Pt was instructed to start taking 3 tablets twice a day now.

## 2024-01-17 ENCOUNTER — HOSPITAL ENCOUNTER (OUTPATIENT)
Facility: HOSPITAL | Age: 74
Discharge: HOME | End: 2024-01-19
Attending: INTERNAL MEDICINE

## 2024-01-17 ENCOUNTER — ANESTHESIA EVENT (OUTPATIENT)
Facility: HOSPITAL | Age: 74
End: 2024-01-17
Payer: MEDICARE

## 2024-01-17 ENCOUNTER — ANESTHESIA (OUTPATIENT)
Facility: HOSPITAL | Age: 74
End: 2024-01-17
Payer: MEDICARE

## 2024-01-17 ENCOUNTER — HOSPITAL ENCOUNTER (INPATIENT)
Facility: HOSPITAL | Age: 74
LOS: 1 days | Discharge: HOME | End: 2024-01-17
Attending: INTERNAL MEDICINE | Admitting: INTERNAL MEDICINE
Payer: MEDICARE

## 2024-01-17 VITALS
SYSTOLIC BLOOD PRESSURE: 135 MMHG | TEMPERATURE: 97.9 F | HEART RATE: 92 BPM | OXYGEN SATURATION: 92 % | DIASTOLIC BLOOD PRESSURE: 111 MMHG | BODY MASS INDEX: 29.55 KG/M2 | WEIGHT: 195 LBS | HEIGHT: 68 IN | RESPIRATION RATE: 19 BRPM

## 2024-01-17 DIAGNOSIS — I48.91 ATRIAL FIBRILLATION (HCC): ICD-10-CM

## 2024-01-17 DIAGNOSIS — I48.19 PERSISTENT ATRIAL FIBRILLATION (HCC): ICD-10-CM

## 2024-01-17 DIAGNOSIS — I10 HTN (HYPERTENSION), BENIGN: Primary | ICD-10-CM

## 2024-01-17 LAB
ACT BLD: 385 SECS (ref 79–138)
GLUCOSE BLD STRIP.AUTO-MCNC: 141 MG/DL (ref 65–117)
INR BLD: 1.6
SERVICE CMNT-IMP: ABNORMAL

## 2024-01-17 PROCEDURE — 82962 GLUCOSE BLOOD TEST: CPT

## 2024-01-17 PROCEDURE — C1889 IMPLANT/INSERT DEVICE, NOC: HCPCS | Performed by: INTERNAL MEDICINE

## 2024-01-17 PROCEDURE — 1100000000 HC RM PRIVATE

## 2024-01-17 PROCEDURE — C1769 GUIDE WIRE: HCPCS | Performed by: INTERNAL MEDICINE

## 2024-01-17 PROCEDURE — C1894 INTRO/SHEATH, NON-LASER: HCPCS | Performed by: INTERNAL MEDICINE

## 2024-01-17 PROCEDURE — 02L73DK OCCLUSION OF LEFT ATRIAL APPENDAGE WITH INTRALUMINAL DEVICE, PERCUTANEOUS APPROACH: ICD-10-PCS | Performed by: INTERNAL MEDICINE

## 2024-01-17 PROCEDURE — 85610 PROTHROMBIN TIME: CPT

## 2024-01-17 PROCEDURE — 3700000000 HC ANESTHESIA ATTENDED CARE: Performed by: INTERNAL MEDICINE

## 2024-01-17 PROCEDURE — 6360000002 HC RX W HCPCS: Performed by: NURSE ANESTHETIST, CERTIFIED REGISTERED

## 2024-01-17 PROCEDURE — 2580000003 HC RX 258: Performed by: NURSE ANESTHETIST, CERTIFIED REGISTERED

## 2024-01-17 PROCEDURE — 33340 PERQ CLSR TCAT L ATR APNDGE: CPT | Performed by: INTERNAL MEDICINE

## 2024-01-17 PROCEDURE — 2709999900 HC NON-CHARGEABLE SUPPLY: Performed by: INTERNAL MEDICINE

## 2024-01-17 PROCEDURE — 3700000001 HC ADD 15 MINUTES (ANESTHESIA): Performed by: INTERNAL MEDICINE

## 2024-01-17 PROCEDURE — 2500000003 HC RX 250 WO HCPCS: Performed by: NURSE ANESTHETIST, CERTIFIED REGISTERED

## 2024-01-17 PROCEDURE — 6360000004 HC RX CONTRAST MEDICATION: Performed by: INTERNAL MEDICINE

## 2024-01-17 PROCEDURE — 85347 COAGULATION TIME ACTIVATED: CPT

## 2024-01-17 DEVICE — LEFT ATRIAL APPENDAGE CLOSURE DEVICE WITH DELIVERY SYSTEM
Type: IMPLANTABLE DEVICE | Status: FUNCTIONAL
Brand: WATCHMAN FLX™

## 2024-01-17 RX ORDER — SODIUM CHLORIDE 0.9 % (FLUSH) 0.9 %
5-40 SYRINGE (ML) INJECTION PRN
Status: DISCONTINUED | OUTPATIENT
Start: 2024-01-17 | End: 2024-01-17 | Stop reason: HOSPADM

## 2024-01-17 RX ORDER — CLOPIDOGREL BISULFATE 75 MG/1
75 TABLET ORAL DAILY
Qty: 90 TABLET | Refills: 1 | Status: SHIPPED | OUTPATIENT
Start: 2024-01-17

## 2024-01-17 RX ORDER — ACETAMINOPHEN 325 MG/1
650 TABLET ORAL EVERY 4 HOURS PRN
Status: DISCONTINUED | OUTPATIENT
Start: 2024-01-17 | End: 2024-01-17 | Stop reason: HOSPADM

## 2024-01-17 RX ORDER — SUCCINYLCHOLINE CHLORIDE 20 MG/ML
INJECTION INTRAMUSCULAR; INTRAVENOUS PRN
Status: DISCONTINUED | OUTPATIENT
Start: 2024-01-17 | End: 2024-01-17 | Stop reason: SDUPTHER

## 2024-01-17 RX ORDER — LIDOCAINE HYDROCHLORIDE 20 MG/ML
INJECTION, SOLUTION EPIDURAL; INFILTRATION; INTRACAUDAL; PERINEURAL PRN
Status: DISCONTINUED | OUTPATIENT
Start: 2024-01-17 | End: 2024-01-17 | Stop reason: SDUPTHER

## 2024-01-17 RX ORDER — SODIUM CHLORIDE 9 MG/ML
INJECTION, SOLUTION INTRAVENOUS CONTINUOUS PRN
Status: DISCONTINUED | OUTPATIENT
Start: 2024-01-17 | End: 2024-01-17 | Stop reason: SDUPTHER

## 2024-01-17 RX ORDER — HEPARIN SODIUM 10000 [USP'U]/ML
INJECTION, SOLUTION INTRAVENOUS; SUBCUTANEOUS PRN
Status: DISCONTINUED | OUTPATIENT
Start: 2024-01-17 | End: 2024-01-17 | Stop reason: HOSPADM

## 2024-01-17 RX ORDER — PROTAMINE SULFATE 10 MG/ML
INJECTION, SOLUTION INTRAVENOUS PRN
Status: DISCONTINUED | OUTPATIENT
Start: 2024-01-17 | End: 2024-01-17 | Stop reason: SDUPTHER

## 2024-01-17 RX ORDER — HEPARIN SODIUM 1000 [USP'U]/ML
INJECTION, SOLUTION INTRAVENOUS; SUBCUTANEOUS PRN
Status: DISCONTINUED | OUTPATIENT
Start: 2024-01-17 | End: 2024-01-17 | Stop reason: SDUPTHER

## 2024-01-17 RX ORDER — SODIUM CHLORIDE 9 MG/ML
INJECTION, SOLUTION INTRAVENOUS PRN
Status: DISCONTINUED | OUTPATIENT
Start: 2024-01-17 | End: 2024-01-17 | Stop reason: HOSPADM

## 2024-01-17 RX ORDER — ONDANSETRON 2 MG/ML
INJECTION INTRAMUSCULAR; INTRAVENOUS PRN
Status: DISCONTINUED | OUTPATIENT
Start: 2024-01-17 | End: 2024-01-17 | Stop reason: SDUPTHER

## 2024-01-17 RX ORDER — SODIUM CHLORIDE 0.9 % (FLUSH) 0.9 %
5-40 SYRINGE (ML) INJECTION EVERY 12 HOURS SCHEDULED
Status: DISCONTINUED | OUTPATIENT
Start: 2024-01-17 | End: 2024-01-17 | Stop reason: HOSPADM

## 2024-01-17 RX ORDER — ROCURONIUM BROMIDE 10 MG/ML
INJECTION, SOLUTION INTRAVENOUS PRN
Status: DISCONTINUED | OUTPATIENT
Start: 2024-01-17 | End: 2024-01-17 | Stop reason: SDUPTHER

## 2024-01-17 RX ADMIN — HEPARIN SODIUM 10000 UNITS: 1000 INJECTION, SOLUTION INTRAVENOUS; SUBCUTANEOUS at 11:44

## 2024-01-17 RX ADMIN — PROTAMINE SULFATE 100 MG: 10 INJECTION, SOLUTION INTRAVENOUS at 11:56

## 2024-01-17 RX ADMIN — PROPOFOL 40 MG: 10 INJECTION, EMULSION INTRAVENOUS at 11:25

## 2024-01-17 RX ADMIN — SODIUM CHLORIDE: 9 INJECTION, SOLUTION INTRAVENOUS at 11:22

## 2024-01-17 RX ADMIN — PHENYLEPHRINE HYDROCHLORIDE 50 MCG/MIN: 10 INJECTION INTRAVENOUS at 11:25

## 2024-01-17 RX ADMIN — PROPOFOL 40 MG: 10 INJECTION, EMULSION INTRAVENOUS at 11:26

## 2024-01-17 RX ADMIN — ROCURONIUM BROMIDE 10 MG: 10 INJECTION INTRAVENOUS at 11:24

## 2024-01-17 RX ADMIN — SUCCINYLCHOLINE CHLORIDE 120 MG: 20 INJECTION, SOLUTION INTRAMUSCULAR; INTRAVENOUS at 11:25

## 2024-01-17 RX ADMIN — PROPOFOL 40 MG: 10 INJECTION, EMULSION INTRAVENOUS at 11:24

## 2024-01-17 RX ADMIN — LIDOCAINE HYDROCHLORIDE 100 MG: 20 INJECTION, SOLUTION EPIDURAL; INFILTRATION; INTRACAUDAL; PERINEURAL at 11:24

## 2024-01-17 RX ADMIN — ONDANSETRON HYDROCHLORIDE 4 MG: 2 INJECTION, SOLUTION INTRAMUSCULAR; INTRAVENOUS at 11:41

## 2024-01-17 NOTE — PROGRESS NOTES
TRANSFER - IN REPORT:    Verbal report received from samina on Samir Dowd Jr.  being received from ep for routine care. Report consisted of patient’s Situation, Background, Assessment and Recommendations(SBAR). Information from the following report(s) procedure log was reviewed with the receiving clinician. Opportunity for questions and clarification was provided. Assessment completed upon patient’s arrival to Cardiac Cath Lab RECOVERY AREA and care assumed.       Cardiac Cath Lab Recovery Arrival Note:    Samir Dowd Jr. arrived to [facility] recovery area.  Patient procedure= watchman. Patient on cardiac monitor, non-invasive blood pressure, SPO2 monitor.  O2 @ 3 lpm via NC.   Patient status doing well without problems. Patient is A&Ox . Patient reports .    PROCEDURE SITE CHECK:    Procedure site:without any bleeding and no hematoma, no pain/discomfort reported at procedure site.     No change in patient status. Continue to monitor patient and status.

## 2024-01-17 NOTE — PROGRESS NOTES
Dual Skin preformed with JESSICA rodgers  Cardiac Cath Lab Recovery Arrival Note:      Samir Dowd Jr. arrived to Cardiac Cath Lab, Recovery Area. Staff introduced to patient. Patient identifiers verified with NAME and DATE OF BIRTH. Procedure verified with patient. Consent forms reviewed and signed by patient or authorized representative and verified. Allergies verified.     Patient and family oriented to department. Patient and family informed of procedure and plan of care.     Questions answered with review. Patient prepped for procedure, per orders from physician, prior to arrival.    Patient on cardiac monitor, non-invasive blood pressure, SPO2 monitor. On RA. Patient is A&Ox 4. Patient reports no pain.     Patient in stretcher, in low position, with side rails up, call bell within reach, patient instructed to call if assistance as needed.    Patient prep in: [facility] Recovery Area, [place] [address].   Patient family has pager #   Family in: [place].   Prep by: miki

## 2024-01-17 NOTE — DISCHARGE SUMMARY
Chance bowen    Observed in pacu  and recovered without issue    D/c to home on dapt    Follow up in 1-2 weeks    Thank you for allowing me to participate in this patients care.    Deejay Ruth MD, FACC, RS

## 2024-01-17 NOTE — PROGRESS NOTES
Patent walked the watts of recovery area. No signs or symptoms of SOB or distress. I have reviewed discharge instructions with the patient.  The patient verbalized understanding.    Discharge medications reviewed with patient and appropriate educational materials regarding medications and side effects teaching were provided.    Site care instructions reviewed with patient. Pain management teaching completed.    Patient instructed to make follow up appointments per discharge instructions.     Patient belongings packed up and accounted for with patient and family. All patient belongings sent home with patient.    Telemetry monitor and wires removed      Patient signed discharge instructions after reviewing them, and duplicate copy placed in chart.

## 2024-01-17 NOTE — ANESTHESIA PRE PROCEDURE
Ready to quit: Not Answered  Counseling given: Not Answered  Tobacco comments: Quit smokin-8 cigarettes a day      Vital Signs (Current):   Vitals:    24 0815 24 0830   BP:  123/85   Pulse:  76   Resp:  19   Temp:  97.8 °F (36.6 °C)   TempSrc:  Oral   Weight: 88.5 kg (195 lb)    Height: 1.727 m (5' 8\")                                               BP Readings from Last 3 Encounters:   24 123/85   01/10/24 115/81   22 113/84       NPO Status:                                                                                 BMI:   Wt Readings from Last 3 Encounters:   24 88.5 kg (195 lb)   01/10/24 88 kg (194 lb 0.1 oz)   22 98.9 kg (218 lb)     Body mass index is 29.65 kg/m².    CBC:   Lab Results   Component Value Date/Time    WBC 3.7 01/10/2024 09:41 AM    RBC 5.81 01/10/2024 09:41 AM    HGB 16.8 01/10/2024 09:41 AM    HCT 50.7 01/10/2024 09:41 AM    MCV 87.3 01/10/2024 09:41 AM    RDW 14.4 01/10/2024 09:41 AM     01/10/2024 09:41 AM       CMP:   Lab Results   Component Value Date/Time     01/10/2024 09:41 AM    K 3.2 01/10/2024 09:41 AM     01/10/2024 09:41 AM    CO2 29 01/10/2024 09:41 AM    BUN 11 01/10/2024 09:41 AM    CREATININE 1.27 01/10/2024 09:41 AM    GFRAA >60 2022 11:07 AM    AGRATIO 1.0 2022 03:35 PM    LABGLOM 60 01/10/2024 09:41 AM    GLUCOSE 255 01/10/2024 09:41 AM    PROT 7.1 2022 03:35 PM    CALCIUM 9.4 01/10/2024 09:41 AM    BILITOT 0.4 2022 03:35 PM    ALKPHOS 94 2022 03:35 PM    AST 19 2022 03:35 PM    ALT 32 2022 03:35 PM       POC Tests:   Recent Labs     24  0837   POCGLU 141*       Coags:   Lab Results   Component Value Date/Time    PROTIME 12.0 2022 11:07 AM    INR 1.6 2024 08:37 AM    INR 3.5 01/10/2024 03:45 PM    INR 2.7 2022 02:49 PM       HCG (If Applicable): No results found for: \"PREGTESTUR\", \"PREGSERUM\", \"HCG\", \"HCGQUANT\"     ABGs: No results found for:

## 2024-01-17 NOTE — ANESTHESIA POSTPROCEDURE EVALUATION
Department of Anesthesiology  Postprocedure Note    Patient: Samir Dowd Jr.  MRN: [id number]  YOB: 1950  Date of evaluation: 1/17/2024    Procedure Summary       Date: 01/17/24 Room / Location: [facility] EP LAB / [facility] CARDIAC CATH LAB    Anesthesia Start: 1119 Anesthesia Stop: 1207    Procedure: Watchman adrian closure device Diagnosis: Persistent atrial fibrillation (HCC)    Providers: Deejay Ruth MD Responsible Provider: Qamar Schneider MD    Anesthesia Type: General ASA Status: 3            Anesthesia Type: General    Jerod Phase I: Jerod Score: 9    Jerod Phase II:      Anesthesia Post Evaluation    Patient location during evaluation: PACU  Patient participation: complete - patient participated  Level of consciousness: responsive to verbal stimuli and sleepy but conscious  Pain score: 2  Airway patency: patent  Cardiovascular status: blood pressure returned to baseline  Respiratory status: acceptable  Hydration status: stable  Comments: +Post-Anesthesia Evaluation and Assessment    Patient: Samir Dowd Jr. MRN: [id number]  SSN: [id number]   YOB: 1950  Age: 73 y.o.  Sex: male          Cardiovascular Function/Vital Signs    /79   Pulse 79   Temp 97.9 °F (36.6 °C)   Resp 20   Ht 1.727 m (5' 8\")   Wt 88.5 kg (195 lb)   SpO2 98%   BMI 29.65 kg/m²     Patient is status post Procedure(s):  Watchman adrian closure device.    Nausea/Vomiting: Controlled.    Postoperative hydration reviewed and adequate.    Pain:      Managed.    Neurological Status:       At baseline.    Mental Status and Level of Consciousness: Arousable.    Pulmonary Status:       Adequate oxygenation and airway patent.    Complications related to anesthesia: None    Post-anesthesia assessment completed. No concerns.    I have evaluated the patient and the patient is stable and ready to be discharged from PACU .    Signed By: Qamar Schenider MD    1/17/2024    Multimodal analgesia pain management approach  Pain

## 2024-01-17 NOTE — DISCHARGE INSTRUCTIONS
[facility]  [address] [place] [address]  [phone]  [email or web address]     WATCHMAN DISCHARGE INSTRUCTIONS    Patient ID:  Samir oDwd Jr.  [id number]  73 y.o.  1950    Admit Date: 1/17/2024    Discharge Date: 1/17/2024     Admitting Physician: [unfilled]     Discharge Physician: Deejay Ruth MD    Admission Diagnoses:   Atrial fibrillation (HCC) [I48.91]  A-fib (HCC) [I48.91]    Discharge Diagnoses:   [unfilled]    Discharge Condition: Good    Cardiology Procedures this Admission:  WATCHMAN implant    Disposition: home    Reference discharge instructions provided by nursing for diet and activity.    Follow-up as per schedule.     Signed:  Deejay Ruth MD  1/17/2024  10:18 AM    S/P WATCHMAN DISCHARGE INSTRUCTIONS    It is normal to feel tired the first couple days.  Take it easy and follow the physician’s instructions.      CHECK THE CATHETER INSERTION SITE DAILY:  You may shower 24 hours after the procedure, remove the bandage during showering.  Wash with soap and water and pat dry.  Gentle cleaning of the site with soap and water is sufficient, cover with a dry clean dressing or bandage.  Do not apply creams or powders to the area.  Do not sit in a bathtub or pool of water for 7 days or until wound has completely healed.  Temporary bruising and discomfort is normal and may last a few weeks.  You may have a  formation of a small lump at the site which may last up to 6 weeks.    CALL THE PHYSICIAN:  If the site becomes red, swollen or feels warm to the touch  If there is bleeding or drainage or if there is unusual pain at the groin or down the leg.  If there is any bleeding, lie down, apply pressure or have someone apply pressure with a clean cloth until the bleeding stops.  If the bleeding continues, call 911 to be transported to the hospital.  DO NOT DRIVE YOURSELF, OR HAVE ANYONE ELSE DRIVE YOU - CALL 911.    Activity:      For the first  24-48 hours or as instructed by the physician:  No lifting, pushing or pulling over 5 pounds and no straining the insertion site. Do not life grocery bags or the garbage can, do not run the vacuum  or  for 7 days.  Start with short walks as in the hospital and gradually increase as tolerated each day.  It is recommended to walk 30 minutes 5-7 days per week.  Follow your physician’s instructions on activity.  Avoid walking outside in extremes of heat or cold.  Walk inside when it is cold and windy or hot and humid.     Things to keep in mind:  No driving for at least 5 days, or as designated by your physician.  Limit the number of times you go up and down the stairs  Take rests and pace yourself with activity.  Be careful and do not strain with bowel movements.    Medications:    Take all medications as prescribed  Call your physician if you have any questions  Keep an updated list of your medications with you at all times and give a list to your physician and pharmacist    Signs and Symptoms:  Be cautious of symptoms of angina or recurrent symptoms such as chest discomfort, unusual shortness of breath or fatigue, palpitations.      After Care:  Follow up with your physician as instructed.  Follow a heart healthy diet with proper portion control, daily stress management, daily exercise, blood pressure and cholesterol control , and smoking cessation.      AFTER YOU TRANSESOPHAGEAL ECHOCARDIOGRAM    Do not eat or drink for at least two hours after your procedure. Your throat will be numb and there is a risk you might have difficulty swallowing for a while. Be careful when you do eat or drink for the first time especially with hot fluids since you could easily burn your throat.    Call your doctor if:    You are bleeding from your throat or mouth.  You have trouble breathing all of a sudden.  You have chest pain or any pain that spreads to your neck, jaw, or arms.  You have questions or concerns.  You

## 2024-01-17 NOTE — ANESTHESIA PROCEDURE NOTES
Procedure Performed: JEANIE       Start Time:        End Time:      Preanesthesia Checklist:  Patient identified, IV assessed, risks and benefits discussed, monitors and equipment assessed, procedure being performed at surgeon's request and anesthesia consent obtained.    General Procedure Information  Diagnostic Indications for Echo:  assessment of ascending aorta, assessment of surgical repair and assessment of valve function  Location performed:  OR  Intubated  Probe Insertion:  Easy  Probe Type:  3D and mulitplane  Modalities:  2D, color flow mapping, continuous wave Doppler and pulse wave Doppler    Echocardiographic and Doppler Measurements    Ventricles    Right Ventricle:  Cavity size normal.  Hypertrophy not present.  Thrombus not present.  Global function mildly impaired.  Ejection Fraction 45%.    Left Ventricle:  Cavity size normal.  Hypertrophy present.  Global Function mildly impaired.  Ejection Fraction 30%.      Ventricular Regional Function:  1- Basal Anteroseptal:  hypokinetic  2- Basal Anterior:  normal  3- Basal Anterolateral:  normal  4- Basal Inferolateral:  normal  5- Basal Inferior:  hypokinetic  6- Basal Inferoseptal:  normal  7- Mid Anteroseptal:  hypokinetic  8- Mid Anterior:  normal  9- Mid Anterolateral:  normal  10- Mid Inferolateral:  hypokinetic  11- Mid Inferior:  hypokinetic  12- Mid Inferoseptal:  hypokinetic  13- Apical Anterior:  hypokinetic  14- Apical Lateral:  hypokinetic  15- Apical Inferior:  hypokinetic  16- Apical Septal:  hypokinetic  17- [place]:  hypokinetic      Valves    Aortic Valve:  Annulus normal.  Stenosis not present.  Regurgitation none.  Leaflets normal.  Leaflet motions normal.      Mitral Valve:  Annulus normal.  Stenosis not present.  Regurgitation moderate.      Tricuspid Valve:  Annulus dilated.  Regurgitation mild.    Pulmonic Valve:  Annulus normal.        Aorta    Ascending Aorta:  Size normal.    Aortic Arch:  Size normal.    Descending Aorta:  Size normal.

## 2024-01-18 LAB — ECHO BSA: 2.06 M2

## 2024-03-15 NOTE — PROGRESS NOTES
A full discussion of the nature of anticoagulants has been carried out. A benefit risk analysis has been presented to the patient, so that they understand the justification for choosing anticoagulation at this time. The need for frequent and regular monitoring, precise dosage adjustment and compliance is stressed. Side effects of potential bleeding are discussed. The patient should avoid any OTC items containing aspirin, naproxen or ibuprofen, and should avoid great swings in general diet. Avoid alcohol consumption. Advised to notify the office if antibiotic or steroid therapy is initiated. Call if any signs of abnormal bleeding are present. Next PT/INR test in 1 week. [email or web address]

## 2024-03-26 ENCOUNTER — HOSPITAL ENCOUNTER (OUTPATIENT)
Facility: HOSPITAL | Age: 74
Discharge: HOME | End: 2024-03-28
Attending: INTERNAL MEDICINE
Payer: MEDICARE

## 2024-03-26 VITALS
BODY MASS INDEX: 28.59 KG/M2 | RESPIRATION RATE: 24 BRPM | OXYGEN SATURATION: 98 % | WEIGHT: 188 LBS | DIASTOLIC BLOOD PRESSURE: 85 MMHG | HEART RATE: 76 BPM | SYSTOLIC BLOOD PRESSURE: 120 MMHG

## 2024-03-26 DIAGNOSIS — Z95.818 PRESENCE OF WATCHMAN LEFT ATRIAL APPENDAGE CLOSURE DEVICE: ICD-10-CM

## 2024-03-26 LAB
ECHO TV REGURGITANT MAX VELOCITY: 1.86 M/S
ECHO TV REGURGITANT PEAK GRADIENT: 14 MMHG

## 2024-03-26 PROCEDURE — 6370000000 HC RX 637 (ALT 250 FOR IP): Performed by: INTERNAL MEDICINE

## 2024-03-26 PROCEDURE — 6360000002 HC RX W HCPCS: Performed by: INTERNAL MEDICINE

## 2024-03-26 PROCEDURE — 93312 ECHO TRANSESOPHAGEAL: CPT

## 2024-03-26 PROCEDURE — 99152 MOD SED SAME PHYS/QHP 5/>YRS: CPT

## 2024-03-26 RX ORDER — MIDAZOLAM HYDROCHLORIDE 1 MG/ML
INJECTION INTRAMUSCULAR; INTRAVENOUS PRN
Status: COMPLETED | OUTPATIENT
Start: 2024-03-26 | End: 2024-03-26

## 2024-03-26 RX ORDER — LIDOCAINE HYDROCHLORIDE 20 MG/ML
SOLUTION OROPHARYNGEAL PRN
Status: COMPLETED | OUTPATIENT
Start: 2024-03-26 | End: 2024-03-26

## 2024-03-26 RX ORDER — FENTANYL CITRATE 50 UG/ML
INJECTION, SOLUTION INTRAMUSCULAR; INTRAVENOUS PRN
Status: COMPLETED | OUTPATIENT
Start: 2024-03-26 | End: 2024-03-26

## 2024-03-26 RX ADMIN — BENZOCAINE, BUTAMBEN, AND TETRACAINE HYDROCHLORIDE 1 SPRAY: .028; .004; .004 AEROSOL, SPRAY TOPICAL at 11:19

## 2024-03-26 RX ADMIN — FENTANYL CITRATE 25 MCG: 50 INJECTION, SOLUTION INTRAMUSCULAR; INTRAVENOUS at 11:23

## 2024-03-26 RX ADMIN — MIDAZOLAM HYDROCHLORIDE 1 MG: 1 INJECTION, SOLUTION INTRAMUSCULAR; INTRAVENOUS at 11:26

## 2024-03-26 RX ADMIN — MIDAZOLAM HYDROCHLORIDE 1 MG: 1 INJECTION, SOLUTION INTRAMUSCULAR; INTRAVENOUS at 11:23

## 2024-03-26 RX ADMIN — LIDOCAINE HYDROCHLORIDE 10 ML: 20 SOLUTION ORAL at 11:16

## 2024-03-26 NOTE — PROGRESS NOTES
Pt sedated with 2mg Versed and 25mcg Fentanyl for JEANIE (monitored sedation from 1122 to 1133)

## 2024-03-26 NOTE — PROGRESS NOTES
Patient arrived to Non-Invasive Cardiology Lab for Out Patient JEANIE Procedure. Staff introduced to patient. Patient identifiers verified with Name and Date of Birth. Procedure verified with patient. Consent forms reviewed and signed by patient or authorized representative and verified. Allergies verified. Patient informed of procedure and plan of care. Questions answered with review.     Patient on cardiac monitor, non-invasive blood pressure, SPO2 monitor. On room air. Patient is A&Ox3. Patient reports no complaints.    Patient on stretcher, in low position, with side rails up. Patient instructed to call for assistance as needed.    Family in waiting room.

## 2024-03-26 NOTE — PROGRESS NOTES
Patient returned to his baseline activity level.  Discharge instructions reviewed and all questions and concerns were answered.  Saline locked IV removed without complications.    Patient wheeled to hospital entrance where his wife was waiting to drive home.

## 2024-03-26 NOTE — DISCHARGE INSTRUCTIONS
You had a JEANIE today and your throat was numbed.  Your throat may be sore for a day.  Do not eat or drink anything today until 1:15p or later.    You had sedation medication today and it is normal for you to feel tire.  Get plenty of rest today.  Be careful when standing up that you aren't light-headed before you start walking.    Do not drive today.    No changes were made to your medications today.  Take all medications as prescribed.    Attend all follow-up appointments as scheduled.  Contact your cardiologist with any questions.

## 2024-05-10 ENCOUNTER — HOSPITAL ENCOUNTER (OUTPATIENT)
Facility: HOSPITAL | Age: 74
End: 2024-05-10
Payer: MEDICARE

## 2024-05-10 ENCOUNTER — TRANSCRIBE ORDERS (OUTPATIENT)
Facility: HOSPITAL | Age: 74
End: 2024-05-10

## 2024-05-10 DIAGNOSIS — Z72.0 TOBACCO ABUSE: ICD-10-CM

## 2024-05-10 DIAGNOSIS — Z72.0 TOBACCO ABUSE: Primary | ICD-10-CM

## 2024-05-10 DIAGNOSIS — R05.3 CHRONIC COUGH: ICD-10-CM

## 2024-05-10 PROCEDURE — 71046 X-RAY EXAM CHEST 2 VIEWS: CPT

## 2024-07-09 ENCOUNTER — HOSPITAL ENCOUNTER (OUTPATIENT)
Facility: HOSPITAL | Age: 74
Discharge: HOME | End: 2024-07-12
Payer: MEDICARE

## 2024-07-09 DIAGNOSIS — Z87.891 PERSONAL HISTORY OF TOBACCO USE, PRESENTING HAZARDS TO HEALTH: ICD-10-CM

## 2024-07-09 PROCEDURE — 71271 CT THORAX LUNG CANCER SCR C-: CPT

## 2024-07-22 ENCOUNTER — ANESTHESIA EVENT (OUTPATIENT)
Facility: HOSPITAL | Age: 74
End: 2024-07-22
Payer: MEDICARE

## 2024-07-22 ENCOUNTER — HOSPITAL ENCOUNTER (OUTPATIENT)
Facility: HOSPITAL | Age: 74
Discharge: HOME | End: 2024-07-22
Attending: INTERNAL MEDICINE | Admitting: INTERNAL MEDICINE
Payer: MEDICARE

## 2024-07-22 ENCOUNTER — ANESTHESIA (OUTPATIENT)
Facility: HOSPITAL | Age: 74
End: 2024-07-22
Payer: MEDICARE

## 2024-07-22 VITALS
HEART RATE: 76 BPM | BODY MASS INDEX: 29.98 KG/M2 | SYSTOLIC BLOOD PRESSURE: 158 MMHG | RESPIRATION RATE: 24 BRPM | HEIGHT: 67 IN | TEMPERATURE: 97.5 F | DIASTOLIC BLOOD PRESSURE: 128 MMHG | OXYGEN SATURATION: 98 % | WEIGHT: 191 LBS

## 2024-07-22 DIAGNOSIS — I49.9 ABNORMAL HEART RHYTHM: ICD-10-CM

## 2024-07-22 DIAGNOSIS — I48.0 PAROXYSMAL ATRIAL FIBRILLATION (HCC): Primary | ICD-10-CM

## 2024-07-22 LAB
ANION GAP SERPL CALC-SCNC: 5 MMOL/L (ref 5–15)
BUN SERPL-MCNC: 9 MG/DL (ref 6–20)
BUN/CREAT SERPL: 8 (ref 12–20)
CALCIUM SERPL-MCNC: 8.8 MG/DL (ref 8.5–10.1)
CHLORIDE SERPL-SCNC: 110 MMOL/L (ref 97–108)
CO2 SERPL-SCNC: 28 MMOL/L (ref 21–32)
CREAT SERPL-MCNC: 1.16 MG/DL (ref 0.7–1.3)
ERYTHROCYTE [DISTWIDTH] IN BLOOD BY AUTOMATED COUNT: 13.2 % (ref 11.5–14.5)
GLUCOSE BLD STRIP.AUTO-MCNC: 147 MG/DL (ref 65–117)
GLUCOSE SERPL-MCNC: 151 MG/DL (ref 65–100)
HCT VFR BLD AUTO: 47.7 % (ref 36.6–50.3)
HGB BLD-MCNC: 16.1 G/DL (ref 12.1–17)
MCH RBC QN AUTO: 29.4 PG (ref 26–34)
MCHC RBC AUTO-ENTMCNC: 33.8 G/DL (ref 30–36.5)
MCV RBC AUTO: 87.2 FL (ref 80–99)
NRBC # BLD: 0 K/UL (ref 0–0.01)
NRBC BLD-RTO: 0 PER 100 WBC
PLATELET # BLD AUTO: 110 K/UL (ref 150–400)
PMV BLD AUTO: 11.4 FL (ref 8.9–12.9)
POTASSIUM SERPL-SCNC: 3.6 MMOL/L (ref 3.5–5.1)
RBC # BLD AUTO: 5.47 M/UL (ref 4.1–5.7)
SERVICE CMNT-IMP: ABNORMAL
SODIUM SERPL-SCNC: 143 MMOL/L (ref 136–145)
WBC # BLD AUTO: 4.1 K/UL (ref 4.1–11.1)

## 2024-07-22 PROCEDURE — C1760 CLOSURE DEV, VASC: HCPCS | Performed by: INTERNAL MEDICINE

## 2024-07-22 PROCEDURE — C1894 INTRO/SHEATH, NON-LASER: HCPCS | Performed by: INTERNAL MEDICINE

## 2024-07-22 PROCEDURE — 2580000003 HC RX 258: Performed by: NURSE ANESTHETIST, CERTIFIED REGISTERED

## 2024-07-22 PROCEDURE — 2500000003 HC RX 250 WO HCPCS: Performed by: INTERNAL MEDICINE

## 2024-07-22 PROCEDURE — 6360000002 HC RX W HCPCS: Performed by: NURSE ANESTHETIST, CERTIFIED REGISTERED

## 2024-07-22 PROCEDURE — 3700000000 HC ANESTHESIA ATTENDED CARE: Performed by: INTERNAL MEDICINE

## 2024-07-22 PROCEDURE — 93650 ICAR CATH ABLTJ AV NODE FUNC: CPT | Performed by: INTERNAL MEDICINE

## 2024-07-22 PROCEDURE — C1732 CATH, EP, DIAG/ABL, 3D/VECT: HCPCS | Performed by: INTERNAL MEDICINE

## 2024-07-22 PROCEDURE — 80048 BASIC METABOLIC PNL TOTAL CA: CPT

## 2024-07-22 PROCEDURE — 2709999900 HC NON-CHARGEABLE SUPPLY: Performed by: INTERNAL MEDICINE

## 2024-07-22 PROCEDURE — 82962 GLUCOSE BLOOD TEST: CPT

## 2024-07-22 PROCEDURE — 3700000001 HC ADD 15 MINUTES (ANESTHESIA): Performed by: INTERNAL MEDICINE

## 2024-07-22 PROCEDURE — 2720000010 HC SURG SUPPLY STERILE: Performed by: INTERNAL MEDICINE

## 2024-07-22 PROCEDURE — 36415 COLL VENOUS BLD VENIPUNCTURE: CPT

## 2024-07-22 PROCEDURE — C1766 INTRO/SHEATH,STRBLE,NON-PEEL: HCPCS | Performed by: INTERNAL MEDICINE

## 2024-07-22 PROCEDURE — 85027 COMPLETE CBC AUTOMATED: CPT

## 2024-07-22 PROCEDURE — 2580000003 HC RX 258: Performed by: INTERNAL MEDICINE

## 2024-07-22 RX ORDER — ONDANSETRON 2 MG/ML
INJECTION INTRAMUSCULAR; INTRAVENOUS PRN
Status: DISCONTINUED | OUTPATIENT
Start: 2024-07-22 | End: 2024-07-22 | Stop reason: SDUPTHER

## 2024-07-22 RX ORDER — PHENYLEPHRINE HCL IN 0.9% NACL 0.4MG/10ML
SYRINGE (ML) INTRAVENOUS PRN
Status: DISCONTINUED | OUTPATIENT
Start: 2024-07-22 | End: 2024-07-22 | Stop reason: SDUPTHER

## 2024-07-22 RX ORDER — SODIUM CHLORIDE 0.9 % (FLUSH) 0.9 %
5-40 SYRINGE (ML) INJECTION EVERY 12 HOURS SCHEDULED
Status: DISCONTINUED | OUTPATIENT
Start: 2024-07-22 | End: 2024-07-22 | Stop reason: HOSPADM

## 2024-07-22 RX ORDER — SODIUM CHLORIDE 9 MG/ML
INJECTION, SOLUTION INTRAVENOUS PRN
Status: DISCONTINUED | OUTPATIENT
Start: 2024-07-22 | End: 2024-07-22 | Stop reason: HOSPADM

## 2024-07-22 RX ORDER — SODIUM CHLORIDE 9 MG/ML
INJECTION, SOLUTION INTRAVENOUS CONTINUOUS PRN
Status: DISCONTINUED | OUTPATIENT
Start: 2024-07-22 | End: 2024-07-22 | Stop reason: SDUPTHER

## 2024-07-22 RX ORDER — LIDOCAINE HYDROCHLORIDE 10 MG/ML
INJECTION, SOLUTION EPIDURAL; INFILTRATION; INTRACAUDAL; PERINEURAL PRN
Status: DISCONTINUED | OUTPATIENT
Start: 2024-07-22 | End: 2024-07-22 | Stop reason: HOSPADM

## 2024-07-22 RX ORDER — DEXAMETHASONE SODIUM PHOSPHATE 4 MG/ML
INJECTION, SOLUTION INTRA-ARTICULAR; INTRALESIONAL; INTRAMUSCULAR; INTRAVENOUS; SOFT TISSUE PRN
Status: DISCONTINUED | OUTPATIENT
Start: 2024-07-22 | End: 2024-07-22 | Stop reason: SDUPTHER

## 2024-07-22 RX ORDER — SODIUM CHLORIDE 0.9 % (FLUSH) 0.9 %
5-40 SYRINGE (ML) INJECTION PRN
Status: DISCONTINUED | OUTPATIENT
Start: 2024-07-22 | End: 2024-07-22 | Stop reason: HOSPADM

## 2024-07-22 RX ORDER — FENTANYL CITRATE 50 UG/ML
INJECTION, SOLUTION INTRAMUSCULAR; INTRAVENOUS PRN
Status: DISCONTINUED | OUTPATIENT
Start: 2024-07-22 | End: 2024-07-22 | Stop reason: SDUPTHER

## 2024-07-22 RX ADMIN — FENTANYL CITRATE 25 MCG: 50 INJECTION, SOLUTION INTRAMUSCULAR; INTRAVENOUS at 09:16

## 2024-07-22 RX ADMIN — PROPOFOL 50 MCG/KG/MIN: 10 INJECTION, EMULSION INTRAVENOUS at 09:19

## 2024-07-22 RX ADMIN — PROPOFOL 10 MG: 10 INJECTION, EMULSION INTRAVENOUS at 09:38

## 2024-07-22 RX ADMIN — FENTANYL CITRATE 25 MCG: 50 INJECTION, SOLUTION INTRAMUSCULAR; INTRAVENOUS at 09:23

## 2024-07-22 RX ADMIN — Medication 40 MCG: at 09:35

## 2024-07-22 RX ADMIN — ONDANSETRON HYDROCHLORIDE 4 MG: 2 INJECTION, SOLUTION INTRAMUSCULAR; INTRAVENOUS at 09:23

## 2024-07-22 RX ADMIN — PROPOFOL 20 MG: 10 INJECTION, EMULSION INTRAVENOUS at 09:16

## 2024-07-22 RX ADMIN — SODIUM CHLORIDE: 9 INJECTION, SOLUTION INTRAVENOUS at 09:13

## 2024-07-22 RX ADMIN — DEXAMETHASONE SODIUM PHOSPHATE 4 MG: 4 INJECTION, SOLUTION INTRAMUSCULAR; INTRAVENOUS at 09:23

## 2024-07-22 NOTE — ANESTHESIA POSTPROCEDURE EVALUATION
Department of Anesthesiology  Postprocedure Note    Patient: Samir Dowd Jr.  MRN: [id number]  YOB: 1950  Date of evaluation: 7/22/2024    Procedure Summary       Date: 07/22/24 Room / Location: [facility] EP LAB / [facility] CARDIAC CATH LAB    Anesthesia Start: 0913 Anesthesia Stop: 1008    Procedure: Ablation AV node Diagnosis: Paroxysmal atrial fibrillation (HCC)    Providers: Deejay Ruth MD Responsible Provider: Kristi Atkinson DO    Anesthesia Type: MAC, TIVA ASA Status: 4            Anesthesia Type: MAC, TIVA    Jerod Phase I: Jerod Score: 10    Jerod Phase II:      Anesthesia Post Evaluation    Patient location during evaluation: PACU  Patient participation: complete - patient participated  Level of consciousness: awake and alert  Pain score: 0  Airway patency: patent  Nausea & Vomiting: no nausea and no vomiting  Cardiovascular status: hemodynamically stable  Respiratory status: acceptable  Hydration status: euvolemic  Pain management: adequate    There were no known notable events for this encounter.

## 2024-07-22 NOTE — PROGRESS NOTES
TRANSFER - IN REPORT:    Verbal report received from sachin on Samir Dowd Jr.  being received from EP for routine care. Report consisted of patient’s Situation, Background, Assessment and Recommendations(SBAR). Information from the following report(s) procedure log was reviewed with the receiving clinician. Opportunity for questions and clarification was provided. Assessment completed upon patient’s arrival to Cardiac Cath Lab RECOVERY AREA and care assumed.       Cardiac Cath Lab Recovery Arrival Note:    Samir Dowd Jr. arrived to CCL recovery area.  Patient procedure= avn. Patient on cardiac monitor, non-invasive blood pressure, SPO2 monitor. On  O2 @ 2 lpm via NC. . Patient is A&Ox 4. Patient reports no pain.    PROCEDURE SITE CHECK:    Procedure site:without any bleeding and no hematoma, no pain/discomfort reported at procedure site.     No change in patient status. Continue to monitor patient and status.

## 2024-07-22 NOTE — ANESTHESIA PRE PROCEDURE
Department of Anesthesiology  Preprocedure Note       Name:  Samir Dowd Jr.   Age:  73 y.o.  :  1950                                          MRN:  [id number]         Date:  2024      Surgeon: Surgeon(s):  Deejay Ruth MD    Procedure: Procedure(s):  Ablation AV node    Medications prior to admission:   Prior to Admission medications    Medication Sig Start Date End Date Taking? Authorizing Provider   clopidogrel (PLAVIX) 75 MG tablet Take 1 tablet by mouth daily  Patient not taking: Reported on 2024   Deejay Ruth MD   Multiple Vitamin (MULTIVITAMIN ADULT PO) Take by mouth    ProviderPeter MD   terbinafine (LAMISIL) 250 MG tablet Take 1 tablet by mouth daily For 90 days  Patient not taking: Reported on 2024    Provider, MD Peter   amLODIPine (NORVASC) 5 MG tablet Take 1 tablet by mouth daily    Automatic Reconciliation, Ar   aspirin 81 MG EC tablet Take 1 tablet by mouth daily    Automatic Reconciliation, Ar   atorvastatin (LIPITOR) 10 MG tablet TAKE 1 TABLET EVERY DAY 19   Automatic Reconciliation, Ar   carvedilol (COREG) 25 MG tablet Take 1 tablet by mouth 2 times daily (with meals)    Automatic Reconciliation, Ar   dapagliflozin (FARXIGA) 5 MG tablet Take 1 tablet by mouth daily    Automatic Reconciliation, Ar   furosemide (LASIX) 20 MG tablet Take 1 tablet by mouth daily    Automatic Reconciliation, Ar   metFORMIN (GLUCOPHAGE-XR) 500 MG extended release tablet Take 3 tablets by mouth Daily with supper 21   Automatic Reconciliation, Ar   potassium chloride (KLOR-CON M) 10 MEQ extended release tablet Take 2 tablets by mouth 2 times daily    Automatic Reconciliation, Ar   sacubitril-valsartan (ENTRESTO)  MG per tablet Take 1 tablet by mouth 2 times daily 22   Automatic Reconciliation, Ar       Current medications:    No current facility-administered medications for this encounter.       Allergies:  No Known Allergies    Problem List:

## 2024-07-22 NOTE — DISCHARGE INSTRUCTIONS
OR HAVE ANYONE ELSE DRIVE YOU - CALL 911.    Activity:      For the first 24-48 hours or as instructed by the physician:  No lifting, pushing or pulling over 5 pounds and no straining the insertion site. Do not lift grocery bags or the garbage can, do not run the vacuum  or  for 7 days.  Start with short walks as in the hospital and gradually increase as tolerated each day.  It is recommended to walk 30 minutes 5-7 days per week.  Follow your physician’s instructions on activity.  Avoid walking outside in extremes of heat or cold.  Walk inside when it is cold and windy or hot and humid.     Things to keep in mind:  No driving for at least 5 days, or as designated by your physician.  Limit the number of times you go up and down the stairs  Take rests and pace yourself with activity.  Be careful and do not strain with bowel movements.    Medications:    Take all medications as prescribed  Call your physician if you have any questions  Keep an updated list of your medications with you at all times and give a list to your physician and pharmacist    Signs and Symptoms:  Be cautious of symptoms of angina or recurrent symptoms such as chest discomfort, unusual shortness of breath or fatigue, palpitations.      After Care:  Follow up with your physician as instructed.  Follow a heart healthy diet with proper portion control, daily stress management, daily exercise, blood pressure and cholesterol control , and smoking cessation.

## 2024-07-22 NOTE — PROGRESS NOTES
Cardiac Cath Lab Recovery Arrival Note:      Samir Dowd Jr. arrived to Cardiac Cath Lab, Recovery Area. Staff introduced to patient. Patient identifiers verified with NAME and DATE OF BIRTH. Procedure verified with patient. Consent forms reviewed and signed by patient or authorized representative and verified. Allergies verified.     Patient and family oriented to department. Patient and family informed of procedure and plan of care.     Questions answered with review. Patient prepped for procedure, per orders from physician, prior to arrival.    Patient on cardiac monitor, non-invasive blood pressure, SPO2 monitor. On RA. Patient is A&Ox 4. Patient reports no pain.     Patient in stretcher, in low position, with side rails up, call bell within reach, patient instructed to call if assistance as needed.    Patient prep in: [facility] Recovery Area, [place] [address].   Patient family has pager #   Family in: [place].   Prep by: deng

## 2024-07-23 LAB — ECHO BSA: 2.02 M2

## 2024-09-20 ENCOUNTER — HOSPITAL ENCOUNTER (OUTPATIENT)
Facility: HOSPITAL | Age: 74
Discharge: HOME | End: 2024-09-23
Payer: MEDICARE

## 2024-09-20 DIAGNOSIS — R06.09 OTHER FORMS OF DYSPNEA: ICD-10-CM

## 2024-09-20 PROCEDURE — 71275 CT ANGIOGRAPHY CHEST: CPT

## 2024-09-20 PROCEDURE — 6360000004 HC RX CONTRAST MEDICATION: Performed by: RADIOLOGY

## 2024-09-20 RX ORDER — IOPAMIDOL 755 MG/ML
100 INJECTION, SOLUTION INTRAVASCULAR
Status: COMPLETED | OUTPATIENT
Start: 2024-09-20 | End: 2024-09-20

## 2024-09-20 RX ADMIN — IOPAMIDOL 100 ML: 755 INJECTION, SOLUTION INTRAVENOUS at 08:48

## 2025-03-10 ENCOUNTER — TRANSCRIBE ORDERS (OUTPATIENT)
Facility: HOSPITAL | Age: 75
End: 2025-03-10

## 2025-03-10 DIAGNOSIS — N18.31 STAGE 3A CHRONIC KIDNEY DISEASE (CKD) (HCC): Primary | ICD-10-CM

## 2025-03-24 ENCOUNTER — HOSPITAL ENCOUNTER (OUTPATIENT)
Facility: HOSPITAL | Age: 75
Discharge: HOME | End: 2025-03-27
Attending: INTERNAL MEDICINE
Payer: MEDICARE

## 2025-03-24 DIAGNOSIS — N18.31 STAGE 3A CHRONIC KIDNEY DISEASE (CKD) (HCC): ICD-10-CM

## 2025-03-24 PROCEDURE — 76770 US EXAM ABDO BACK WALL COMP: CPT

## 2025-06-26 ENCOUNTER — TRANSCRIBE ORDERS (OUTPATIENT)
Facility: HOSPITAL | Age: 75
End: 2025-06-26

## 2025-06-26 DIAGNOSIS — Z12.2 ENCOUNTER FOR SCREENING FOR MALIGNANT NEOPLASM OF RESPIRATORY ORGANS: ICD-10-CM

## 2025-06-26 DIAGNOSIS — F17.200 CURRENT SMOKER: ICD-10-CM

## 2025-06-26 DIAGNOSIS — Z87.891 PERSONAL HISTORY OF TOBACCO USE: Primary | ICD-10-CM

## 2025-07-11 ENCOUNTER — HOSPITAL ENCOUNTER (OUTPATIENT)
Facility: HOSPITAL | Age: 75
Discharge: HOME | End: 2025-07-14
Payer: MEDICARE

## 2025-07-11 DIAGNOSIS — Z12.2 ENCOUNTER FOR SCREENING FOR MALIGNANT NEOPLASM OF RESPIRATORY ORGANS: ICD-10-CM

## 2025-07-11 DIAGNOSIS — Z87.891 PERSONAL HISTORY OF TOBACCO USE: ICD-10-CM

## 2025-07-11 DIAGNOSIS — F17.200 CURRENT SMOKER: ICD-10-CM

## 2025-07-11 PROCEDURE — 71271 CT THORAX LUNG CANCER SCR C-: CPT

## (undated) DEVICE — PINNACLE INTRODUCER SHEATH: Brand: PINNACLE

## (undated) DEVICE — VALVE INSRT TOOL ADPT MTL 9FR -- ACCESS

## (undated) DEVICE — SHTH STEERABLE FLEXCATH 12 FR --

## (undated) DEVICE — TUBING PRSS MON L6IN PVC M FEM CONN

## (undated) DEVICE — Device: Brand: ACUNAV 10F ULTRASOUND CATHETER

## (undated) DEVICE — 3M™ TEGADERM™ TRANSPARENT FILM DRESSING FRAME STYLE, 1626W, 4 IN X 4-3/4 IN (10 CM X 12 CM), 50/CT 4CT/CASE: Brand: 3M™ TEGADERM™

## (undated) DEVICE — MEDI-TRACE CADENCE ADULT, DEFIBRILLATION ELECTRODE -RTS  (10 PR/PK) - PHYSIO-CONTROL: Brand: MEDI-TRACE CADENCE

## (undated) DEVICE — CABLE RMFG SUPREME BPTPLR/QPLR --

## (undated) DEVICE — HEART CATH-MRMC: Brand: MEDLINE INDUSTRIES, INC.

## (undated) DEVICE — PRESSURE MONITORING SET: Brand: TRUWAVE

## (undated) DEVICE — KENDALL RADIOLUCENT FOAM MONITORING ELECTRODE RECTANGULAR SHAPE: Brand: KENDALL

## (undated) DEVICE — CABLE CATH CONN 10 PIN DISP -- ACHIEVE ADVANCE

## (undated) DEVICE — SHEATH GUID 11.5X8.5FR L71MM M CRV L22MM BIDIR STEER CARTO

## (undated) DEVICE — CABLE EXT EP H/O/D BLK 150CM --

## (undated) DEVICE — INTRO SHTH 7FR 13X20CM -- TEARAWAY

## (undated) DEVICE — GUIDEWIRE VASC L260CM 0.035IN J TIP L3MM PTFE FIX COR NAMIC

## (undated) DEVICE — MEDI-VAC NON-CONDUCTIVE SUCTION TUBING: Brand: CARDINAL HEALTH

## (undated) DEVICE — GUIDEWIRE VASC L145CM 0.035IN J TIP L3MM PTFE FIX COR NAMIC

## (undated) DEVICE — CATHETER CRYOABLATION 28 MM ARCTIC FRONT ADV

## (undated) DEVICE — SUTURE PERMAHAND SZ 0 L30IN NONABSORBABLE BLK L26MM SH 1/2 K834H

## (undated) DEVICE — STOPCOCK IV 4 W TRNSPAR

## (undated) DEVICE — CATHETER ANGIO JR4 PIG 145 DEG 6 FRX100 CM MP SUPER TORQUE +

## (undated) DEVICE — CABLE RMFG RSPONS ELEC EXT RED --

## (undated) DEVICE — 3M™ IOBAN™ 2 ANTIMICROBIAL INCISE DRAPE 6640EZ: Brand: IOBAN™ 2

## (undated) DEVICE — CATHETER ETER CARD MULTIPAK MULTIPAK 5FR PERFORMA

## (undated) DEVICE — ELECTRODE PT RET AD L9FT HI MOIST COND ADH HYDRGEL CORDED

## (undated) DEVICE — LIMB HOLDER, WRIST/ANKLE: Brand: DEROYAL

## (undated) DEVICE — RADIFOCUS GLIDEWIRE: Brand: GLIDEWIRE

## (undated) DEVICE — REM POLYHESIVE ADULT PATIENT RETURN ELECTRODE: Brand: VALLEYLAB

## (undated) DEVICE — PAD NON-ADHERENT 3X4 STRL LF --

## (undated) DEVICE — CABLE PACE ALGTR CLP SAF 12FT --

## (undated) DEVICE — DRUG IRRIGATION 250 ML 0.9% SODIUM CHLORIDE LATEX-FREE AQUALITE PLASTIC POUR BOTTLE

## (undated) DEVICE — COVADERM: Brand: DEROYAL

## (undated) DEVICE — ROCKER SWITCH PENCIL HOLSTER: Brand: VALLEYLAB

## (undated) DEVICE — GLIDESHEATH SLENDER ACCESS KIT: Brand: GLIDESHEATH SLENDER

## (undated) DEVICE — BAND COMPR L24CM REG CLR PLAS HEMSTAT EXT HK AND LOOP RETEN

## (undated) DEVICE — CATHETER ABLAT 8FR L115CM 1-6-2MM SPC TIP 3.5MM FJ CRV

## (undated) DEVICE — CATHETER DIAG 6FR L110CM INTRO 6FR BLLN DIA9MM 1CC PULM ART

## (undated) DEVICE — ANGIOGRAPHY KIT CUST [K0910930B] [MERIT MEDICAL SYSTEMS INC]

## (undated) DEVICE — IRRIGATION KT PIST SYR 60ML -- CONVERT TO ITEM 116415

## (undated) DEVICE — CATHETER ANGIO IM AD 5 FRX100 CM PERFORMA

## (undated) DEVICE — PACEMAKER PACK: Brand: MEDLINE INDUSTRIES, INC.

## (undated) DEVICE — KIT COAX UMBILICAL FOR N20 --

## (undated) DEVICE — SPLINT WR POS F/ARTERIAL ACC -- BX/10

## (undated) DEVICE — KIT ELECTRICAL UMBILICAL --

## (undated) DEVICE — DRESSING HEMSTAT W3INXL2YD 1 PLY Z FLD QUIKCLOT CONTROL+

## (undated) DEVICE — PROVE COVER: Brand: UNBRANDED

## (undated) DEVICE — BASIC SINGLE BASIN BTC-LF: Brand: MEDLINE INDUSTRIES, INC.

## (undated) DEVICE — CATH EP CRV 7F DUO 2/8 2M LG -- LIVEWIRE STRL

## (undated) DEVICE — PATCH REF EXT FOR CARTO 3 SYS (EA = 6 PACKS)

## (undated) DEVICE — 1 X VERSACROSS TRANSSEPTAL SHEATH (INCLUDING  1 X J-TIP GUIDEWIRE); 1 X VERSACROSS RF WIRE (INCLUDING 1 X CONNECTOR CABLE (SINGLE USE)); 1 X DISPERSIVE ELECTRODE: Brand: VERSACROSS ACCESS SOLUTION

## (undated) DEVICE — DRAPE STRL ANGIO W/ 2 FLD COLLCTN PCH 86X135 218X343 CM 2

## (undated) DEVICE — DRESSING HEMOSTATIC SFT INTVENT W/O SLT DBL WRP QUIKCLOT LF

## (undated) DEVICE — KIT ACCS INTRO 4FR L10CM NDL 21GA L7CM GWIRE L40CM

## (undated) DEVICE — ZIP 8I SURGICAL SKIN CLOSURE DEVICE: Brand: ZIP 8I SURGICAL SKIN CLOSURE DEVICE

## (undated) DEVICE — COPILOT BLEEDBACK CONTROL VALVE: Brand: COPILOT

## (undated) DEVICE — DRAPE SURG W25XL50IN E OPN CIR BND BG

## (undated) DEVICE — GUIDE COR SNUS L40CM DIA9FR 0.035IN STD CRV ADV UNIQUE

## (undated) DEVICE — CHECK-FLO INTRODUCER SET: Brand: PERFORMER

## (undated) DEVICE — CATH EP ACHV MPPNG 3.3FR 20MM -- ACHIEVE

## (undated) DEVICE — PACK PROCEDURE SURG HRT CATH

## (undated) DEVICE — GDWIRE WHISPER HITORQ EDS CSJ -- ACUITY SOLD BY BX ONLY 4648

## (undated) DEVICE — TUBING PMP FOR CARTO SYS SMARTABLATE

## (undated) DEVICE — CATH QUAD 6F 2/5/2 120CM JSN --

## (undated) DEVICE — SYR POWER 150ML 8IN FILL TUBE --

## (undated) DEVICE — SYSTEM INTRO 10.5FR L13CM STD WHT CAP HEMSTAT SPLITTABLE

## (undated) DEVICE — CABLE CATH L10FT RED PIN CONN 34-34 FOR THERMOCOOL

## (undated) DEVICE — SUTURE ABSORBABLE BRAIDED 2-0 CT-1 27 IN UD VICRYL J259H

## (undated) DEVICE — ACCESS SHEATH WITH DILATOR: Brand: WATCHMAN FXD CURVE™ ACCESS SYSTEM

## (undated) DEVICE — TOWEL,OR,DSP,ST,BLUE,STD,2/PK,40PK/CS: Brand: MEDLINE

## (undated) DEVICE — 1 X VERSACROSS CONNECT TRANSSEPTAL DILATOR (INCLUDING 1 X J-TIP MECHANICAL GUIDEWIRE); 1 X VERSACROSS RF WIRE (INCLUDING 1 X CONNECTOR CABLE (SINGLE USE)); 1 X DISPERSIVE ELECTRODE: Brand: VERSACROSS CONNECT LAAC ACCESS SOLUTION

## (undated) DEVICE — PROBE ES TEMP HOT AND CLD FAST ACCURATE SFT FLX CIRCA S CATH

## (undated) DEVICE — HI-TORQUE VERSACORE FLOPPY GUIDE WIRE SYSTEM 145 CM: Brand: HI-TORQUE VERSACORE

## (undated) DEVICE — VASCADE MVP SYSTEM CLOSURE 6-12 FR VEN VASC

## (undated) DEVICE — SUT SLK 0 30IN SH BLK --

## (undated) DEVICE — SYRINGE ANGIO 10 CC BRL STD PRNT POLYCARB LT BLU MEDALLION